# Patient Record
Sex: FEMALE
[De-identification: names, ages, dates, MRNs, and addresses within clinical notes are randomized per-mention and may not be internally consistent; named-entity substitution may affect disease eponyms.]

---

## 2018-02-16 PROBLEM — Z00.00 ENCOUNTER FOR PREVENTIVE HEALTH EXAMINATION: Status: ACTIVE | Noted: 2018-02-16

## 2020-01-22 ENCOUNTER — TRANSCRIPTION ENCOUNTER (OUTPATIENT)
Age: 40
End: 2020-01-22

## 2020-06-02 ENCOUNTER — APPOINTMENT (OUTPATIENT)
Dept: NEUROLOGY | Facility: CLINIC | Age: 40
End: 2020-06-02
Payer: COMMERCIAL

## 2020-06-02 VITALS
DIASTOLIC BLOOD PRESSURE: 75 MMHG | SYSTOLIC BLOOD PRESSURE: 108 MMHG | BODY MASS INDEX: 30.16 KG/M2 | HEIGHT: 68 IN | HEART RATE: 77 BPM | WEIGHT: 199 LBS

## 2020-06-02 DIAGNOSIS — F41.9 ANXIETY DISORDER, UNSPECIFIED: ICD-10-CM

## 2020-06-02 DIAGNOSIS — F51.5 NIGHTMARE DISORDER: ICD-10-CM

## 2020-06-02 DIAGNOSIS — Z83.3 FAMILY HISTORY OF DIABETES MELLITUS: ICD-10-CM

## 2020-06-02 DIAGNOSIS — H43.399 OTHER VITREOUS OPACITIES, UNSPECIFIED EYE: ICD-10-CM

## 2020-06-02 DIAGNOSIS — F17.200 NICOTINE DEPENDENCE, UNSPECIFIED, UNCOMPLICATED: ICD-10-CM

## 2020-06-02 DIAGNOSIS — F32.9 ANXIETY DISORDER, UNSPECIFIED: ICD-10-CM

## 2020-06-02 DIAGNOSIS — H53.9 UNSPECIFIED VISUAL DISTURBANCE: ICD-10-CM

## 2020-06-02 PROCEDURE — 99205 OFFICE O/P NEW HI 60 MIN: CPT | Mod: 25

## 2020-06-02 PROCEDURE — 96116 NUBHVL XM PHYS/QHP 1ST HR: CPT | Mod: 59

## 2020-06-02 RX ORDER — MULTIVIT-MIN/IRON/FOLIC ACID/K 18-600-40
CAPSULE ORAL
Refills: 0 | Status: ACTIVE | COMMUNITY

## 2020-06-02 RX ORDER — COLD-HOT PACK
EACH MISCELLANEOUS
Refills: 0 | Status: ACTIVE | COMMUNITY

## 2020-06-02 RX ORDER — PNV NO.95/FERROUS FUM/FOLIC AC 28MG-0.8MG
TABLET ORAL
Refills: 0 | Status: ACTIVE | COMMUNITY

## 2020-06-02 RX ORDER — MULTIVITAMIN
TABLET ORAL
Refills: 0 | Status: ACTIVE | COMMUNITY

## 2020-06-02 RX ORDER — GLUC/MSM/COLGN2/HYAL/ANTIARTH3 375-375-20
TABLET ORAL
Refills: 0 | Status: ACTIVE | COMMUNITY

## 2020-06-10 ENCOUNTER — APPOINTMENT (OUTPATIENT)
Dept: PULMONOLOGY | Facility: CLINIC | Age: 40
End: 2020-06-10
Payer: COMMERCIAL

## 2020-06-10 DIAGNOSIS — G47.52 REM SLEEP BEHAVIOR DISORDER: ICD-10-CM

## 2020-06-10 DIAGNOSIS — F19.982 OTHER PSYCHOACTIVE SUBSTANCE USE, UNSPECIFIED WITH PSYCHOACTIVE SUBSTANCE-INDUCED SLEEP DISORDER: ICD-10-CM

## 2020-06-10 DIAGNOSIS — R06.83 SNORING: ICD-10-CM

## 2020-06-10 PROCEDURE — 99204 OFFICE O/P NEW MOD 45 MIN: CPT | Mod: 95

## 2020-06-10 NOTE — HISTORY OF PRESENT ILLNESS
[Home] : at home, [unfilled] , at the time of the visit. [Medical Office: ([facility])___] : at the medical office located in  [Verbal consent obtained from patient] : the patient, [unfilled] [Other: ___] : [unfilled] [Snoring] : snoring [ESS: ___] : ESS score [unfilled] [Awakes Unrefreshed] : awakening unrefreshed [Awakes with Headache] : headache upon awakening [Unusual Sleep Behavior] : unusual sleep behavior [FreeTextEntry1] : This is a 39-year-old female with a significant past medical history of anxiety and depression who comes in for evaluation of unusual sleep behavior. She was referred by Dr. Sprague. The patient initially went to go see her neurologist for fear of having Alzheimer's-like behavior. She was noted to have unusual behaviors during sleep and insufficient sleep time and was referred to me.\par \par Patient states that she was told recently that she sleep talks. She admits to falling off her bed in the last 4 months which has never happened to her. She carries a history of sleep walking sleep eating while using hypnotic medications such zolpidem.\par \par She also complains of vivid dreams and feeling as though "she is still in the dream "when she wakes up. Her dreams or so vivid that she still has the same emotional response to them. She tends to have traumatic dreams and feels very upset when waking up. This usually happens in the morning around 8 or 9 AM.\par \par She carries a diagnosis of anxiety and depression and takes Wellbutrin and Cymbalta. She's taken antidepressant medication for over the last 1-2 years. She admits to smoking before bed and in the past has used THC to help her fall sleep. She can drink occasional alcohol and she has sleep initiation problems. [Witnessed Apneas] : no witnessed sleep apnea [Daytime Somnolence] : no daytime somnolence [Frequent Nocturnal Awakening] : no nocturnal awakening [Unintentional Sleep While Inactive] : no unintentional sleep while inactive [Unintentional Sleep while Active] : no unintentional sleep while active [Awakening With Dry Mouth] : no dry mouth upon awakening [DIS] : no DIS [Recent  Weight Gain] : no recent weight gain [DMS] : no DMS [Hypersomnolence] : no hypersomnolence [Cataplexy] : no cataplexy [Sleep Paralysis] : no sleep paralysis [Hypnagogic Hallucinations] : no hallucinations when falling asleep

## 2020-06-10 NOTE — REASON FOR VISIT
[Consultation] : a consultation visit [Sleep Apnea] : sleep apnea [FreeTextEntry2] : REM Behavior Disorder [FreeTextEntry1] : Referred by Dr. Sprague

## 2020-06-10 NOTE — CONSULT LETTER
[Dear  ___] : Dear  [unfilled], [Consult Letter:] : I had the pleasure of evaluating your patient, [unfilled]. [Please see my note below.] : Please see my note below. [Consult Closing:] : Thank you very much for allowing me to participate in the care of this patient.  If you have any questions, please do not hesitate to contact me. [Sincerely,] : Sincerely, [FreeTextEntry3] : Olaf Laguna DO, MPH\par Pulmonary, Critical Care, and Sleep Medicine\par  of Medicine\par [employer] [facility]

## 2020-06-10 NOTE — PHYSICAL EXAM
[General Appearance - Well Developed] : well developed [Normal Appearance] : normal appearance [Enlarged Base of the Tongue] : enlargement of the base of the tongue [General Appearance - Well Nourished] : well nourished [IV] : IV [Neck Appearance] : the appearance of the neck was normal

## 2020-06-10 NOTE — REVIEW OF SYSTEMS
[Fatigue] : fatigue [Snoring] : snoring [A.M. Headache] : headache present upon awakening [Depression] : depression [Anxious] : anxious [Difficulty Initiating Sleep] : difficulty falling asleep [Unusual Sleep Behavior] : unusual sleep behavior [Negative] : Genitourinary [Witnessed Apneas] : no witnessed apnea [EDS: ESS=____] : no daytime somnolence [Leg Dysesthesias] : no leg dysesthesias [A.M. Dry Mouth] : no a.m. dry mouth [Acute Insomnia] : no acute insomnia [Difficulty Maintaining Sleep] : no difficulty maintaining sleep [Lower Extremity Discomfort] : no lower extremity discomfort [Chronic Insomnia] : no chronic  insomnia [LE discomfort relieved by movement] : lower extremity discomfort not relieved by movement [Irresistible urge to move legs] : no irresistible urge to move legs because of lower extremity discomfort [Late day/ Evening symptoms] : no late day/evening symptoms [Hypersomnolence] : not sleeping much more than usual [Sleep Disturbances due to LE symptoms] : ~T no sleep disturbances due to lower extremity symptoms [Cataplexy] :  no cataplexy [Hypnogogic Hallucinations] : no hypnogogic hallucinations [Hypnopompic Hallucinations] : no hypnopompic hallucinations [Sleep Paralysis] : no sleep paralysis

## 2020-06-11 ENCOUNTER — APPOINTMENT (OUTPATIENT)
Dept: NEUROLOGY | Facility: CLINIC | Age: 40
End: 2020-06-11

## 2020-07-01 ENCOUNTER — APPOINTMENT (OUTPATIENT)
Dept: NEUROLOGY | Facility: CLINIC | Age: 40
End: 2020-07-01
Payer: COMMERCIAL

## 2020-07-01 PROCEDURE — 96121 NUBHVL XM PHY/QHP EA ADDL HR: CPT | Mod: 95

## 2020-07-01 PROCEDURE — 96116 NUBHVL XM PHYS/QHP 1ST HR: CPT | Mod: 95

## 2020-07-08 ENCOUNTER — APPOINTMENT (OUTPATIENT)
Dept: NEUROLOGY | Facility: CLINIC | Age: 40
End: 2020-07-08
Payer: COMMERCIAL

## 2020-07-08 PROCEDURE — 96138 PSYCL/NRPSYC TECH 1ST: CPT | Mod: 95

## 2020-07-08 PROCEDURE — 95816 EEG AWAKE AND DROWSY: CPT

## 2020-07-08 PROCEDURE — 96133 NRPSYC TST EVAL PHYS/QHP EA: CPT | Mod: 95

## 2020-08-12 ENCOUNTER — APPOINTMENT (OUTPATIENT)
Dept: NEUROLOGY | Facility: CLINIC | Age: 40
End: 2020-08-12

## 2020-08-16 DIAGNOSIS — Z01.818 ENCOUNTER FOR OTHER PREPROCEDURAL EXAMINATION: ICD-10-CM

## 2020-08-18 ENCOUNTER — APPOINTMENT (OUTPATIENT)
Dept: DISASTER EMERGENCY | Facility: CLINIC | Age: 40
End: 2020-08-18

## 2020-08-19 LAB — SARS-COV-2 N GENE NPH QL NAA+PROBE: NOT DETECTED

## 2020-08-21 ENCOUNTER — FORM ENCOUNTER (OUTPATIENT)
Age: 40
End: 2020-08-21

## 2020-08-22 ENCOUNTER — OUTPATIENT (OUTPATIENT)
Dept: OUTPATIENT SERVICES | Facility: HOSPITAL | Age: 40
LOS: 1 days | End: 2020-08-22
Payer: COMMERCIAL

## 2020-08-22 ENCOUNTER — APPOINTMENT (OUTPATIENT)
Dept: SLEEP CENTER | Facility: CLINIC | Age: 40
End: 2020-08-22
Payer: COMMERCIAL

## 2020-08-22 PROCEDURE — 95810 POLYSOM 6/> YRS 4/> PARAM: CPT | Mod: 26

## 2020-08-22 PROCEDURE — 95810 POLYSOM 6/> YRS 4/> PARAM: CPT

## 2020-08-24 DIAGNOSIS — G47.33 OBSTRUCTIVE SLEEP APNEA (ADULT) (PEDIATRIC): ICD-10-CM

## 2020-09-02 ENCOUNTER — LABORATORY RESULT (OUTPATIENT)
Age: 40
End: 2020-09-02

## 2020-09-02 ENCOUNTER — APPOINTMENT (OUTPATIENT)
Dept: DISASTER EMERGENCY | Facility: CLINIC | Age: 40
End: 2020-09-02

## 2020-09-02 DIAGNOSIS — G47.61 PERIODIC LIMB MOVEMENT DISORDER: ICD-10-CM

## 2020-09-04 ENCOUNTER — APPOINTMENT (OUTPATIENT)
Dept: NEUROLOGY | Facility: CLINIC | Age: 40
End: 2020-09-04
Payer: COMMERCIAL

## 2020-09-04 PROCEDURE — 96133 NRPSYC TST EVAL PHYS/QHP EA: CPT

## 2020-09-04 PROCEDURE — 96139 PSYCL/NRPSYC TST TECH EA: CPT

## 2020-09-25 LAB
BASOPHILS # BLD AUTO: 0.06 K/UL
BASOPHILS NFR BLD AUTO: 0.9 %
EOSINOPHIL # BLD AUTO: 0.29 K/UL
EOSINOPHIL NFR BLD AUTO: 4.6 %
HCT VFR BLD CALC: 35.2 %
HGB BLD-MCNC: 10.7 G/DL
IMM GRANULOCYTES NFR BLD AUTO: 0.2 %
LYMPHOCYTES # BLD AUTO: 1.96 K/UL
LYMPHOCYTES NFR BLD AUTO: 31 %
MAN DIFF?: NORMAL
MCHC RBC-ENTMCNC: 27.4 PG
MCHC RBC-ENTMCNC: 30.4 GM/DL
MCV RBC AUTO: 90 FL
MONOCYTES # BLD AUTO: 0.41 K/UL
MONOCYTES NFR BLD AUTO: 6.5 %
NEUTROPHILS # BLD AUTO: 3.6 K/UL
NEUTROPHILS NFR BLD AUTO: 56.8 %
PLATELET # BLD AUTO: 373 K/UL
RBC # BLD: 3.91 M/UL
RBC # FLD: 13.7 %
WBC # FLD AUTO: 6.33 K/UL

## 2020-09-29 LAB
ANION GAP SERPL CALC-SCNC: 14 MMOL/L
BUN SERPL-MCNC: 11 MG/DL
CALCIUM SERPL-MCNC: 8.9 MG/DL
CHLORIDE SERPL-SCNC: 101 MMOL/L
CO2 SERPL-SCNC: 22 MMOL/L
CREAT SERPL-MCNC: 0.65 MG/DL
FERRITIN SERPL-MCNC: 17 NG/ML
GLUCOSE SERPL-MCNC: 95 MG/DL
IRON SATN MFR SERPL: 31 %
IRON SERPL-MCNC: 106 UG/DL
POTASSIUM SERPL-SCNC: 4.5 MMOL/L
SODIUM SERPL-SCNC: 136 MMOL/L
TIBC SERPL-MCNC: 336 UG/DL
UIBC SERPL-MCNC: 231 UG/DL

## 2020-10-07 ENCOUNTER — APPOINTMENT (OUTPATIENT)
Dept: NEUROLOGY | Facility: CLINIC | Age: 40
End: 2020-10-07
Payer: COMMERCIAL

## 2020-10-07 VITALS — SYSTOLIC BLOOD PRESSURE: 107 MMHG | HEART RATE: 76 BPM | DIASTOLIC BLOOD PRESSURE: 71 MMHG

## 2020-10-07 VITALS — TEMPERATURE: 97.2 F

## 2020-10-07 DIAGNOSIS — Z87.898 PERSONAL HISTORY OF OTHER SPECIFIED CONDITIONS: ICD-10-CM

## 2020-10-07 DIAGNOSIS — R41.89 OTHER SYMPTOMS AND SIGNS INVOLVING COGNITIVE FUNCTIONS AND AWARENESS: ICD-10-CM

## 2020-10-07 PROCEDURE — 99215 OFFICE O/P EST HI 40 MIN: CPT

## 2020-12-30 ENCOUNTER — TRANSCRIPTION ENCOUNTER (OUTPATIENT)
Age: 40
End: 2020-12-30

## 2021-01-29 ENCOUNTER — NON-APPOINTMENT (OUTPATIENT)
Age: 41
End: 2021-01-29

## 2021-04-05 ENCOUNTER — APPOINTMENT (OUTPATIENT)
Dept: PULMONOLOGY | Facility: CLINIC | Age: 41
End: 2021-04-05
Payer: MEDICAID

## 2021-04-05 ENCOUNTER — APPOINTMENT (OUTPATIENT)
Dept: DISASTER EMERGENCY | Facility: OTHER | Age: 41
End: 2021-04-05
Payer: COMMERCIAL

## 2021-04-05 DIAGNOSIS — G47.61 PERIODIC LIMB MOVEMENT DISORDER: ICD-10-CM

## 2021-04-05 DIAGNOSIS — G25.81 RESTLESS LEGS SYNDROME: ICD-10-CM

## 2021-04-05 PROCEDURE — 99214 OFFICE O/P EST MOD 30 MIN: CPT | Mod: 95

## 2021-04-05 PROCEDURE — 0001A: CPT

## 2021-04-05 RX ORDER — DULOXETINE HYDROCHLORIDE 60 MG/1
60 CAPSULE, DELAYED RELEASE ORAL
Refills: 0 | Status: DISCONTINUED | COMMUNITY
End: 2021-04-05

## 2021-04-05 RX ORDER — BUPROPION HYDROCHLORIDE 75 MG/1
75 TABLET, FILM COATED ORAL
Refills: 0 | Status: DISCONTINUED | COMMUNITY
End: 2021-04-05

## 2021-04-08 PROBLEM — G25.81 RESTLESS LEG SYNDROME: Status: ACTIVE | Noted: 2021-04-08

## 2021-04-08 PROBLEM — G47.61 PLMD (PERIODIC LIMB MOVEMENT DISORDER): Status: ACTIVE | Noted: 2021-04-08

## 2021-04-08 NOTE — PHYSICAL EXAM
[General Appearance - Well Developed] : well developed [General Appearance - Well Nourished] : well nourished [Oriented To Time, Place, And Person] : oriented to person, place, and time [Impaired Insight] : insight and judgment were intact [Affect] : the affect was normal

## 2021-04-08 NOTE — REVIEW OF SYSTEMS
[EDS: ESS=____] : daytime somnolence: ESS=[unfilled] [Snoring] : snoring [Leg Dysesthesias] : leg dysesthesias [Depression] : depression [Lower Extremity Discomfort] : lower extremity discomfort [Irresistible urge to move legs] : irresistible urge to move legs because of lower extremity discomfort [LE discomfort relieved by movement] : lower extremity discomfort relieved by movement [Late day/ Evening symptoms] : late day/evening symptoms [Sleep Disturbances due to LE symptoms] : ~T sleep disturbances due to lower extremity symptoms [Mild] : RLS symptom severity: mild [Negative] : Genitourinary [Witnessed Apneas] : no witnessed apnea [A.M. Dry Mouth] : no a.m. dry mouth [Unusual Sleep Behavior] : no unusual sleep behavior [Hypersomnolence] : not sleeping much more than usual [Cataplexy] :  no cataplexy [Hypnogogic Hallucinations] : no hypnogogic hallucinations [Hypnopompic Hallucinations] : no hypnopompic hallucinations

## 2021-04-08 NOTE — HISTORY OF PRESENT ILLNESS
[Home] : at home, [unfilled] , at the time of the visit. [Medical Office: ([facility])___] : at the medical office located in  [Verbal consent obtained from patient] : the patient, [unfilled] [Snoring] : snoring [Frequent Nocturnal Awakening] : frequent nocturnal awakening [ESS: ___] : ESS score [unfilled] [Awakes Unrefreshed] : awakening unrefreshed [DIS] : DIS [DMS] : DMS [Lower Extremity Discomfort] : lower extremity discomfort in evening or at bedtime [FreeTextEntry1] : \par \par Ms. Meadows is a 40 year old female who come in for a follow up. Per the patient she continues to have frequent nocturnal awakenings in the middle of the night and nonrestorative sleep. She was found to have periodic limb movements and after further questioning she has some RLS symptoms as well. She was not found to have any sleep disordered breathing. When she took her PSG she was on Cymbalta and Wellbutrin which could affect her PLMs. She is currently off all psychiatric medications. She has complied with my request of taking iron with vitamin C. She is interested in obtaining another PSG. \par \par ______________________________________________________________________________________ \par EPWORTH SLEEPINESS SCALE \par How likely are you to doze off or fall asleep in the situations described below, in contrast to feeling just tired? \par This refers to your usual way of life in recent times. \par Even if you haven't done some of these things recently, try to work out how they would have affected you. \par Use the following scale to choose one most appropriate number for each situation. \par \par 0 = Never would doze \par 1 = Slight chance of dozing \par 2 = Moderate chance of dozing \par 3 = High chance of dozing \par \par  \par 2........................................Sitting and reading \par 0........................................Watching TV \par 0........................................Sitting inactive in a public place (eg a theatre or a meeting) \par 0........................................As a passenger in a car for an hour without a break \par 2........................................Lying down to rest in the afternoon when circumstances permit \par 0........................................Sitting and talking to someone \par 1........................................Sitting quietly after lunch without alcohol \par 0........................................In a car, while stopped for a few minutes in traffic \par 5........................................TOTAL SCORE \par ______________________________________________________________________________________ \par \par  [Witnessed Apneas] : no witnessed sleep apnea [Unintentional Sleep while Active] : no unintentional sleep while active [Unintentional Sleep While Inactive] : no unintentional sleep while inactive [Awakes with Headache] : no headache upon awakening [Hypersomnolence] : no hypersomnolence [Cataplexy] : no cataplexy [Sleep Paralysis] : no sleep paralysis [Hypnagogic Hallucinations] : no hallucinations when falling asleep [Hypnopompic Hallucinations] : no hallucinations when awakening

## 2021-04-24 ENCOUNTER — OUTPATIENT (OUTPATIENT)
Dept: OUTPATIENT SERVICES | Facility: HOSPITAL | Age: 41
LOS: 1 days | End: 2021-04-24
Payer: MEDICAID

## 2021-04-24 ENCOUNTER — APPOINTMENT (OUTPATIENT)
Dept: SLEEP CENTER | Facility: CLINIC | Age: 41
End: 2021-04-24
Payer: MEDICAID

## 2021-04-24 PROCEDURE — 95810 POLYSOM 6/> YRS 4/> PARAM: CPT

## 2021-04-24 PROCEDURE — 95810 POLYSOM 6/> YRS 4/> PARAM: CPT | Mod: 26

## 2021-04-26 ENCOUNTER — APPOINTMENT (OUTPATIENT)
Dept: DISASTER EMERGENCY | Facility: OTHER | Age: 41
End: 2021-04-26

## 2021-04-26 DIAGNOSIS — G47.33 OBSTRUCTIVE SLEEP APNEA (ADULT) (PEDIATRIC): ICD-10-CM

## 2021-05-07 ENCOUNTER — NON-APPOINTMENT (OUTPATIENT)
Age: 41
End: 2021-05-07

## 2021-06-01 ENCOUNTER — OUTPATIENT (OUTPATIENT)
Dept: OUTPATIENT SERVICES | Facility: HOSPITAL | Age: 41
LOS: 1 days | End: 2021-06-01
Payer: MEDICAID

## 2021-06-01 PROCEDURE — G9005: CPT

## 2021-06-08 DIAGNOSIS — Z71.89 OTHER SPECIFIED COUNSELING: ICD-10-CM

## 2021-07-07 ENCOUNTER — APPOINTMENT (OUTPATIENT)
Dept: PULMONOLOGY | Facility: CLINIC | Age: 41
End: 2021-07-07
Payer: MEDICAID

## 2021-07-07 DIAGNOSIS — F51.04 PSYCHOPHYSIOLOGIC INSOMNIA: ICD-10-CM

## 2021-07-07 PROCEDURE — 99214 OFFICE O/P EST MOD 30 MIN: CPT | Mod: 95

## 2021-07-07 RX ORDER — ZALEPLON 5 MG/1
5 CAPSULE ORAL
Qty: 30 | Refills: 0 | Status: ACTIVE | COMMUNITY
Start: 2021-07-07 | End: 1900-01-01

## 2021-07-07 NOTE — ASSESSMENT
[FreeTextEntry1] : Ms. Meadows is a 40 year old female who come in for a follow up. She does not have any significant sleep disorder breathing in order she have any periodic limb movements on her last sleep study. She is previously tried 3 different medications with only trazodone being successful. Given that her main issue is sleep initiation, she may try a medication like Sonata which has a short half-life and to help her fall asleep and not feel groggy in the morning. I have instructed her only to take this medication and to discontinue her other medications prior to bedtime. She will initiate 5 mg of Sonata and if this is unsuccessful she can take 10 mg.\par \par She will follow up with me in 2-3 months.

## 2021-07-07 NOTE — HISTORY OF PRESENT ILLNESS
[Home] : at home, [unfilled] , at the time of the visit. [Medical Office: ([facility])___] : at the medical office located in  [Verbal consent obtained from patient] : the patient, [unfilled] [Insomnia Due To Mental Disorder] : insomnia due to mental disorder [Snoring] : snoring [Frequent Nocturnal Awakening] : frequent nocturnal awakening [Awakes Unrefreshed] : awakening unrefreshed [DIS] : DIS [DMS] : DMS [Lower Extremity Discomfort] : lower extremity discomfort in evening or at bedtime [FreeTextEntry1] : \par \par Ms. Meadows is a 40 year old female who come in for a follow up. She did not have any significant sleep disordered breathing on her in lab polysomnography and there is significant periodic limb movements either. Her psychiatrist has placed her on Zoloft, but for sleep she is on trazodone 100-200 mg q.h.s., hydroxyzine 25 mg q.h.s., or Ambien. She does not like taking hydroxyzine or Ambien because it makes her groggy the next morning. She also does not like taking high doses of trazodone. She is looking for another alternative to help with her insomnia. She does admit that her sleep initiation is more of an issue than sleep maintenance.\par ______________________________________________________________________________________ \par EPWORTH SLEEPINESS SCALE \par How likely are you to doze off or fall asleep in the situations described below, in contrast to feeling just tired? \par This refers to your usual way of life in recent times. \par Even if you haven't done some of these things recently, try to work out how they would have affected you. \par Use the following scale to choose one most appropriate number for each situation. \par \par 0 = Never would doze \par 1 = Slight chance of dozing \par 2 = Moderate chance of dozing \par 3 = High chance of dozing \par \par  \par 2........................................Sitting and reading \par 0........................................Watching TV \par 0........................................Sitting inactive in a public place (eg a theatre or a meeting) \par 0........................................As a passenger in a car for an hour without a break \par 2........................................Lying down to rest in the afternoon when circumstances permit \par 0........................................Sitting and talking to someone \par 1........................................Sitting quietly after lunch without alcohol \par 0........................................In a car, while stopped for a few minutes in traffic \par 5........................................TOTAL SCORE \par ______________________________________________________________________________________ \par \par  [Witnessed Apneas] : no witnessed sleep apnea [Unintentional Sleep while Active] : no unintentional sleep while active [Unintentional Sleep While Inactive] : no unintentional sleep while inactive [Awakes with Headache] : no headache upon awakening [Hypersomnolence] : no hypersomnolence [Cataplexy] : no cataplexy [Sleep Paralysis] : no sleep paralysis [Hypnagogic Hallucinations] : no hallucinations when falling asleep [Hypnopompic Hallucinations] : no hallucinations when awakening

## 2023-03-20 ENCOUNTER — OFFICE VISIT (OUTPATIENT)
Dept: FAMILY MEDICINE CLINIC | Facility: CLINIC | Age: 43
End: 2023-03-20

## 2023-03-20 VITALS
BODY MASS INDEX: 26.78 KG/M2 | HEIGHT: 69 IN | SYSTOLIC BLOOD PRESSURE: 98 MMHG | TEMPERATURE: 98.1 F | OXYGEN SATURATION: 98 % | HEART RATE: 64 BPM | WEIGHT: 180.8 LBS | DIASTOLIC BLOOD PRESSURE: 62 MMHG

## 2023-03-20 DIAGNOSIS — N39.3 STRESS INCONTINENCE OF URINE: ICD-10-CM

## 2023-03-20 DIAGNOSIS — Z11.4 SCREENING FOR HIV (HUMAN IMMUNODEFICIENCY VIRUS): ICD-10-CM

## 2023-03-20 DIAGNOSIS — Z12.31 ENCOUNTER FOR SCREENING MAMMOGRAM FOR MALIGNANT NEOPLASM OF BREAST: ICD-10-CM

## 2023-03-20 DIAGNOSIS — Z12.83 SCREENING FOR SKIN CANCER: ICD-10-CM

## 2023-03-20 DIAGNOSIS — F32.1 CURRENT MODERATE EPISODE OF MAJOR DEPRESSIVE DISORDER, UNSPECIFIED WHETHER RECURRENT (HCC): Primary | ICD-10-CM

## 2023-03-20 DIAGNOSIS — Z11.59 ENCOUNTER FOR HEPATITIS C SCREENING TEST FOR LOW RISK PATIENT: ICD-10-CM

## 2023-03-20 DIAGNOSIS — R63.5 WEIGHT GAIN: ICD-10-CM

## 2023-03-20 DIAGNOSIS — B00.1 COLD SORE: ICD-10-CM

## 2023-03-20 DIAGNOSIS — K92.1 BLOOD IN STOOL: ICD-10-CM

## 2023-03-20 RX ORDER — VALACYCLOVIR HYDROCHLORIDE 1 G/1
TABLET, FILM COATED ORAL
COMMUNITY
Start: 2022-12-13 | End: 2023-03-20 | Stop reason: SDUPTHER

## 2023-03-20 RX ORDER — VALACYCLOVIR HYDROCHLORIDE 1 G/1
TABLET, FILM COATED ORAL
Qty: 10 TABLET | Refills: 1 | Status: SHIPPED | OUTPATIENT
Start: 2023-03-20 | End: 2023-03-21

## 2023-03-20 RX ORDER — SERTRALINE HYDROCHLORIDE 25 MG/1
25 TABLET, FILM COATED ORAL EVERY MORNING
COMMUNITY
Start: 2023-02-14

## 2023-03-20 NOTE — ASSESSMENT & PLAN NOTE
To continue sertraline 75 mg daily  Advised to resume counseling    Provided referral to psychiatrist

## 2023-03-20 NOTE — PROGRESS NOTES
Assessment/Plan:    Current moderate episode of major depressive disorder ([facility] )  To continue sertraline 75 mg daily  Advised to resume counseling  Provided referral to psychiatrist     Weight gain  To obtain labs, will call with results  Stress incontinence of urine  To begin pelvic floor therapy  Blood in stool  Provided referral to GI  Diagnoses and all orders for this visit:    Current moderate episode of major depressive disorder, unspecified whether recurrent ([facility] )  -     TSH, 3rd generation with Free T4 reflex; Future  -     Ambulatory Referral to Psychiatry; Future  -     Follicle stimulating hormone; Future  -     Luteinizing hormone; Future  -     Estradiol; Future    Encounter for screening mammogram for malignant neoplasm of breast  -     Mammo screening bilateral w 3d & cad; Future    Screening for HIV (human immunodeficiency virus)  -     : HIV 1/2 AB/AG w Reflex [facility] for 2 yr old and above; Future    Encounter for hepatitis C screening test for low risk patient  -     Hepatitis C antibody; Future    Blood in stool  -     Ambulatory referral to Gastroenterology; Future    Stress incontinence of urine  -     Ambulatory Referral to Physical Therapy; Future    Screening for skin cancer  Comments:  Provided referral to dermatology  Orders:  -     Ambulatory Referral to Dermatology; Future    Weight gain  -     CBC and differential; Future  -     Comprehensive metabolic panel; Future  -     Lipid Panel with Direct LDL reflex; Future  -     TSH, 3rd generation with Free T4 reflex; Future  -     Follicle stimulating hormone; Future  -     Luteinizing hormone; Future  -     Estradiol; Future    Cold sore  -     valACYclovir (VALTREX) 1,000 mg tablet; Take 2 tablets by mouth every 12 hours x 1 day    Other orders  -     Discontinue: valACYclovir (VALTREX) 1,000 mg tablet; TAKE 2 TABLETS BY MOUTH EVERY 12 HOURS FOR 1 DAY  -     sertraline (ZOLOFT) 50 mg tablet;  Take 50 mg by mouth daily  - sertraline (ZOLOFT) 25 mg tablet; Take 25 mg by mouth every morning          Subjective:      Patient ID: Sergey Masterson is a 36 y o  female  Fátima Juares presents for an initial visit  She recently relocated from [place] about 2 years ago  She has a past medical history of anxiety and depression  She has a past surgical history of breast augmentation  She would like a referral to a psychiatrist to continue care as she has been previously connected to one in the past   She does receive counseling but needs to find one locally  She is requesting lab work  Fátimaraheem Juares is experiencing hot flashes, menstrual changes, and vaginal dryness as well as weight gain  She is requesting referral to a dermatologist to be screened for skin cancer  Would also like to begin pelvic floor therapy due to stress incontinence  She has tried Kegel exercises in the past   Additionally, Fátima Juares will note blood after having a bowel movement  She had a colonoscopy in 2018 which was normal       The following portions of the patient's history were reviewed and updated as appropriate: She   Patient Active Problem List    Diagnosis Date Noted   • Current moderate episode of major depressive disorder ([facility] ) 03/20/2023   • Blood in stool 03/20/2023   • Stress incontinence of urine 03/20/2023   • Weight gain 03/20/2023     Current Outpatient Medications   Medication Sig Dispense Refill   • sertraline (ZOLOFT) 25 mg tablet Take 25 mg by mouth every morning     • sertraline (ZOLOFT) 50 mg tablet Take 50 mg by mouth daily     • valACYclovir (VALTREX) 1,000 mg tablet Take 2 tablets by mouth every 12 hours x 1 day 10 tablet 1     No current facility-administered medications for this visit  She has No Known Allergies       Review of Systems   Constitutional: Positive for unexpected weight change (weight gain)  HENT: Positive for congestion (Chronic, after breaking her nose)  Eyes: Negative  Respiratory: Negative      Cardiovascular: Negative  Gastrointestinal: Positive for blood in stool  Endocrine: Positive for heat intolerance (hot flashes)  Genitourinary:        Urinary incontinence    Musculoskeletal: Negative  Skin: Negative  Allergic/Immunologic: Negative  Neurological: Negative  Psychiatric/Behavioral: Positive for dysphoric mood  The patient is nervous/anxious  BP 98/62   Pulse 64   Temp 98 1 °F (36 7 °C)   Ht 5' 9" (1 753 m)   Wt 82 kg (180 lb 12 8 oz)   SpO2 98%   BMI 26 70 kg/m²     Objective:     Physical Exam  Vitals and nursing note reviewed  Constitutional:       General: She is not in acute distress  Appearance: She is well-developed  HENT:      Head: Normocephalic and atraumatic  Right Ear: Hearing, tympanic membrane and external ear normal       Left Ear: Hearing, tympanic membrane and external ear normal       Nose: Nose normal       Mouth/Throat:      Pharynx: Uvula midline  Eyes:      General: Lids are normal       Conjunctiva/sclera: Conjunctivae normal       Pupils: Pupils are equal, round, and reactive to light  Neck:      Thyroid: No thyromegaly  Cardiovascular:      Rate and Rhythm: Normal rate and regular rhythm  Heart sounds: Normal heart sounds  No murmur heard  Pulmonary:      Effort: Pulmonary effort is normal  No respiratory distress  Breath sounds: Normal breath sounds  No wheezing or rales  Chest:      Chest wall: No tenderness  Abdominal:      General: Bowel sounds are normal  There is no distension  Palpations: Abdomen is soft  There is no mass  Tenderness: There is no abdominal tenderness  There is no guarding or rebound  Musculoskeletal:         General: No tenderness or deformity  Normal range of motion  Cervical back: Normal range of motion and neck supple  Lymphadenopathy:      Cervical: No cervical adenopathy  Skin:     General: Skin is warm and dry  Coloration: Skin is not pale        Findings: No erythema or rash    Neurological:      Mental Status: She is alert and oriented to person, place, and time  Cranial Nerves: No cranial nerve deficit  Psychiatric:         Behavior: Behavior normal          Thought Content: Thought content normal          Judgment: Judgment normal          BMI Counseling: Body mass index is 26 7 kg/m²  The BMI is above normal  Nutrition recommendations include decreasing overall calorie intake, 3-5 servings of fruits/vegetables daily, reducing fast food intake, consuming healthier snacks, decreasing soda and/or juice intake, moderation in carbohydrate intake and increasing intake of lean protein  Exercise recommendations include moderate aerobic physical activity for 150 minutes/week  Depression Screening Follow-up Plan: Patient's depression screening was positive with a PHQ-2 score of 5  Their PHQ-9 score was   Patient assessed for underlying major depression  They have no active suicidal ideations  Brief counseling provided and recommend additional follow-up/re-evaluation next office visit

## 2023-04-21 DIAGNOSIS — B00.1 COLD SORE: ICD-10-CM

## 2023-04-21 NOTE — TELEPHONE ENCOUNTER
Pt left a message stating that she couldn't get a Psychiatry appointment, she is on a waiting list and she is running out of Zoloft for 50 and 25 mg

## 2023-04-24 DIAGNOSIS — F32.1 CURRENT MODERATE EPISODE OF MAJOR DEPRESSIVE DISORDER, UNSPECIFIED WHETHER RECURRENT (HCC): ICD-10-CM

## 2023-04-24 DIAGNOSIS — Z12.31 ENCOUNTER FOR SCREENING MAMMOGRAM FOR MALIGNANT NEOPLASM OF BREAST: Primary | ICD-10-CM

## 2023-04-24 RX ORDER — SERTRALINE HYDROCHLORIDE 25 MG/1
25 TABLET, FILM COATED ORAL EVERY MORNING
Qty: 30 TABLET | Refills: 3 | Status: SHIPPED | OUTPATIENT
Start: 2023-04-24

## 2023-04-24 RX ORDER — VALACYCLOVIR HYDROCHLORIDE 1 G/1
TABLET, FILM COATED ORAL
Qty: 10 TABLET | Refills: 1 | Status: SHIPPED | OUTPATIENT
Start: 2023-04-24 | End: 2023-05-22

## 2023-05-03 ENCOUNTER — TELEPHONE (OUTPATIENT)
Dept: GASTROENTEROLOGY | Facility: CLINIC | Age: 43
End: 2023-05-03

## 2023-05-08 DIAGNOSIS — K58.0 IRRITABLE BOWEL SYNDROME WITH DIARRHEA: Primary | ICD-10-CM

## 2023-05-08 NOTE — TELEPHONE ENCOUNTER
Pt calling asking to speak with nurse regarding medical records that were received from [address] [place] [address]

## 2023-05-16 DIAGNOSIS — F32.1 CURRENT MODERATE EPISODE OF MAJOR DEPRESSIVE DISORDER, UNSPECIFIED WHETHER RECURRENT (HCC): ICD-10-CM

## 2023-05-24 ENCOUNTER — TELEPHONE (OUTPATIENT)
Dept: GASTROENTEROLOGY | Facility: CLINIC | Age: 43
End: 2023-05-24

## 2023-06-15 ENCOUNTER — TELEPHONE (OUTPATIENT)
Dept: PSYCHIATRY | Facility: CLINIC | Age: 43
End: 2023-06-15

## 2023-06-15 NOTE — TELEPHONE ENCOUNTER
Contacted patient off of Medication Management  to verify needs of services in attempts to offer patient an appointment at [facility] for patient to contact intake dept  in regards to scheduling appointment

## 2023-06-21 NOTE — PROGRESS NOTES
Diagnoses and all orders for this visit:    External hemorrhoids  -     Ambulatory Referral to Colorectal Surgery       External hemorrhoids  Patient presents for evaluation of perianal concerns  Patient notes occasional blood when wiping  This been going on for some time  No recent exacerbation  Patient had colonoscopy in 2019  Examination shows anterior skin redundancy without narrow-base skin tag  Small grade 2 internal hemorrhoids are noted  Examination today is largely unremarkable  Intermittent perianal skin symptoms  Excision of the skin redundancy is unlikely to give her significant benefit is not recommended at this time  Topical Calmoseptine is reasonable for intermittent skin irritation  I suspect that this bleeding is given her description  Otherwise high-fiber diet is recommended  I will see her back as her symptoms dictate  HPI     [place] Cao is here today referred by SHAN Love external hemorrhoids  The patient notes an anal lumps and bright red rectal bleeding on the tissue paper, on and off; symptoms have been ongoing for years  Patient notes she has IBS with episodes of diarrhea  His most recent colonoscopy on 6/20/2019 with Dr John Dixon showed non bleeding internal hemorrhoids and skin tags  5 year recall due to family history of colonic polyps in her mother  No past medical history on file    Past Surgical History:   Procedure Laterality Date   • AUGMENTATION BREAST Bilateral        Current Outpatient Medications:   •  hydrocortisone (ANUSOL-HC) 2 5 % rectal cream, Apply topically 2 (two) times a day, Disp: 28 g, Rfl: 1  •  sertraline (ZOLOFT) 25 mg tablet, Take 1 tablet (25 mg total) by mouth every morning, Disp: 30 tablet, Rfl: 3  •  sertraline (ZOLOFT) 50 mg tablet, TAKE 1 TABLET BY MOUTH EVERY DAY, Disp: 90 tablet, Rfl: 2  •  valACYclovir (VALTREX) 1,000 mg tablet, Take 2 tablets by mouth every 12 hours x 1 day, Disp: 10 tablet, Rfl: 1  Allergies as of 06/26/2023   • (No Known Allergies)     Review of Systems   Gastrointestinal: Positive for anal bleeding and diarrhea  All other systems reviewed and are negative  There were no vitals filed for this visit  Physical Exam  Constitutional:       Appearance: Normal appearance  HENT:      Head: Normocephalic and atraumatic  Eyes:      Extraocular Movements: Extraocular movements intact  Pupils: Pupils are equal, round, and reactive to light  Musculoskeletal:         General: Normal range of motion  Skin:     General: Skin is warm and dry  Neurological:      General: No focal deficit present  Mental Status: She is alert and oriented to person, place, and time  Psychiatric:         Mood and Affect: Mood normal          Behavior: Behavior normal          Thought Content: Thought content normal          Judgment: Judgment normal      Lower Endoscopy    Date/Time: 6/26/2023 1:15 PM    Performed by: Shalini Damian MD  Authorized by: Shalini Damian MD    Verbal consent obtained?: Yes    Risks and benefits: Risks, benefits and alternatives were discussed    Consent given by:  Patient  Patient identity confirmed:  Verbally with patient  Indications: rectal bleeding    Patient sedated: No    Scope type:   Anoscope  External exam performed: Yes    Pilonidal sinus tract: No    Pilonidal cyst: No    Pilonidal tenderness: No    Perianal skin tags: Yes    Perirectal warts: No    Perianal maceration: No    Perianal induration: No    Perianal erythema: No    External hemorrhoids: No    Digital exam performed: Yes    Laxity of anal sphincter: No    Internal hemorrhoids: Yes    Prolapsed: No    Inflammation: No    Anal fissures: No    Anal fistulae: No    Anal stricture: No    Abscess: No    Procedure termination:  Procedure complete  Patient tolerance:  Patient tolerated the procedure well with no immediate complications

## 2023-06-26 ENCOUNTER — OFFICE VISIT (OUTPATIENT)
Age: 43
End: 2023-06-26
Payer: COMMERCIAL

## 2023-06-26 VITALS — HEIGHT: 68 IN | WEIGHT: 178 LBS | BODY MASS INDEX: 26.98 KG/M2

## 2023-06-26 DIAGNOSIS — K64.4 EXTERNAL HEMORRHOIDS: Primary | ICD-10-CM

## 2023-06-26 PROCEDURE — 99203 OFFICE O/P NEW LOW 30 MIN: CPT | Performed by: COLON & RECTAL SURGERY

## 2023-06-26 PROCEDURE — 46600 DIAGNOSTIC ANOSCOPY SPX: CPT | Performed by: COLON & RECTAL SURGERY

## 2023-06-26 NOTE — ASSESSMENT & PLAN NOTE
Patient presents for evaluation of perianal concerns  Patient notes occasional blood when wiping  This been going on for some time  No recent exacerbation  Patient had colonoscopy in 2019  Examination shows anterior skin redundancy without narrow-base skin tag  Small grade 2 internal hemorrhoids are noted  Examination today is largely unremarkable  Intermittent perianal skin symptoms  Excision of the skin redundancy is unlikely to give her significant benefit is not recommended at this time  Topical Calmoseptine is reasonable for intermittent skin irritation  I suspect that this bleeding is given her description  Otherwise high-fiber diet is recommended  I will see her back as her symptoms dictate

## 2023-06-27 ENCOUNTER — APPOINTMENT (OUTPATIENT)
Dept: LAB | Facility: CLINIC | Age: 43
End: 2023-06-27
Payer: COMMERCIAL

## 2023-06-27 DIAGNOSIS — R63.5 WEIGHT GAIN: ICD-10-CM

## 2023-06-27 DIAGNOSIS — K59.1 FUNCTIONAL DIARRHEA: ICD-10-CM

## 2023-06-27 DIAGNOSIS — Z11.59 ENCOUNTER FOR HEPATITIS C SCREENING TEST FOR LOW RISK PATIENT: ICD-10-CM

## 2023-06-27 DIAGNOSIS — F32.1 CURRENT MODERATE EPISODE OF MAJOR DEPRESSIVE DISORDER, UNSPECIFIED WHETHER RECURRENT (HCC): ICD-10-CM

## 2023-06-27 DIAGNOSIS — Z11.4 SCREENING FOR HIV (HUMAN IMMUNODEFICIENCY VIRUS): ICD-10-CM

## 2023-06-27 LAB
ALBUMIN SERPL BCP-MCNC: 3.6 G/DL (ref 3.5–5)
ALP SERPL-CCNC: 54 U/L (ref 46–116)
ALT SERPL W P-5'-P-CCNC: 22 U/L (ref 12–78)
ANION GAP SERPL CALCULATED.3IONS-SCNC: 2 MMOL/L
AST SERPL W P-5'-P-CCNC: 21 U/L (ref 5–45)
BASOPHILS # BLD AUTO: 0.05 THOUSANDS/ÂΜL (ref 0–0.1)
BASOPHILS NFR BLD AUTO: 1 % (ref 0–1)
BILIRUB SERPL-MCNC: 0.48 MG/DL (ref 0.2–1)
BUN SERPL-MCNC: 12 MG/DL (ref 5–25)
CALCIUM SERPL-MCNC: 8.9 MG/DL (ref 8.3–10.1)
CHLORIDE SERPL-SCNC: 108 MMOL/L (ref 96–108)
CHOLEST SERPL-MCNC: 157 MG/DL
CO2 SERPL-SCNC: 27 MMOL/L (ref 21–32)
CREAT SERPL-MCNC: 0.58 MG/DL (ref 0.6–1.3)
EOSINOPHIL # BLD AUTO: 0.2 THOUSAND/ÂΜL (ref 0–0.61)
EOSINOPHIL NFR BLD AUTO: 5 % (ref 0–6)
ERYTHROCYTE [DISTWIDTH] IN BLOOD BY AUTOMATED COUNT: 12.7 % (ref 11.6–15.1)
ESTRADIOL SERPL-MCNC: 26.5 PG/ML
FERRITIN SERPL-MCNC: 15 NG/ML (ref 11–307)
FSH SERPL-ACNC: 11.3 MIU/ML
GFR SERPL CREATININE-BSD FRML MDRD: 113 ML/MIN/1.73SQ M
GLUCOSE P FAST SERPL-MCNC: 90 MG/DL (ref 65–99)
HCT VFR BLD AUTO: 34.2 % (ref 34.8–46.1)
HCV AB SER QL: NORMAL
HDLC SERPL-MCNC: 45 MG/DL
HGB BLD-MCNC: 11.3 G/DL (ref 11.5–15.4)
HIV 1+2 AB+HIV1 P24 AG SERPL QL IA: NORMAL
HIV 2 AB SERPL QL IA: NORMAL
HIV1 AB SERPL QL IA: NORMAL
HIV1 P24 AG SERPL QL IA: NORMAL
IMM GRANULOCYTES # BLD AUTO: 0.01 THOUSAND/UL (ref 0–0.2)
IMM GRANULOCYTES NFR BLD AUTO: 0 % (ref 0–2)
IRON SATN MFR SERPL: 20 % (ref 15–50)
IRON SERPL-MCNC: 62 UG/DL (ref 50–170)
LDLC SERPL CALC-MCNC: 100 MG/DL (ref 0–100)
LH SERPL-ACNC: 5.1 MIU/ML
LYMPHOCYTES # BLD AUTO: 1.4 THOUSANDS/ÂΜL (ref 0.6–4.47)
LYMPHOCYTES NFR BLD AUTO: 34 % (ref 14–44)
MCH RBC QN AUTO: 30.1 PG (ref 26.8–34.3)
MCHC RBC AUTO-ENTMCNC: 33 G/DL (ref 31.4–37.4)
MCV RBC AUTO: 91 FL (ref 82–98)
MONOCYTES # BLD AUTO: 0.28 THOUSAND/ÂΜL (ref 0.17–1.22)
MONOCYTES NFR BLD AUTO: 7 % (ref 4–12)
NEUTROPHILS # BLD AUTO: 2.13 THOUSANDS/ÂΜL (ref 1.85–7.62)
NEUTS SEG NFR BLD AUTO: 53 % (ref 43–75)
NRBC BLD AUTO-RTO: 0 /100 WBCS
PLATELET # BLD AUTO: 284 THOUSANDS/UL (ref 149–390)
PMV BLD AUTO: 11 FL (ref 8.9–12.7)
POTASSIUM SERPL-SCNC: 4 MMOL/L (ref 3.5–5.3)
PROT SERPL-MCNC: 7.3 G/DL (ref 6.4–8.4)
RBC # BLD AUTO: 3.75 MILLION/UL (ref 3.81–5.12)
SODIUM SERPL-SCNC: 137 MMOL/L (ref 135–147)
TIBC SERPL-MCNC: 303 UG/DL (ref 250–450)
TRIGL SERPL-MCNC: 61 MG/DL
TSH SERPL DL<=0.05 MIU/L-ACNC: 1.88 UIU/ML (ref 0.45–4.5)
WBC # BLD AUTO: 4.07 THOUSAND/UL (ref 4.31–10.16)

## 2023-06-27 PROCEDURE — 80053 COMPREHEN METABOLIC PANEL: CPT

## 2023-06-27 PROCEDURE — 84443 ASSAY THYROID STIM HORMONE: CPT

## 2023-06-27 PROCEDURE — 86231 EMA EACH IG CLASS: CPT

## 2023-06-27 PROCEDURE — 86364 TISS TRNSGLTMNASE EA IG CLAS: CPT

## 2023-06-27 PROCEDURE — 86258 DGP ANTIBODY EACH IG CLASS: CPT

## 2023-06-27 PROCEDURE — 83550 IRON BINDING TEST: CPT

## 2023-06-27 PROCEDURE — 82728 ASSAY OF FERRITIN: CPT

## 2023-06-27 PROCEDURE — 82670 ASSAY OF TOTAL ESTRADIOL: CPT

## 2023-06-27 PROCEDURE — 87389 HIV-1 AG W/HIV-1&-2 AB AG IA: CPT

## 2023-06-27 PROCEDURE — 80061 LIPID PANEL: CPT

## 2023-06-27 PROCEDURE — 83002 ASSAY OF GONADOTROPIN (LH): CPT

## 2023-06-27 PROCEDURE — 83001 ASSAY OF GONADOTROPIN (FSH): CPT

## 2023-06-27 PROCEDURE — 83540 ASSAY OF IRON: CPT

## 2023-06-27 PROCEDURE — 85025 COMPLETE CBC W/AUTO DIFF WBC: CPT

## 2023-06-27 PROCEDURE — 86803 HEPATITIS C AB TEST: CPT

## 2023-06-27 PROCEDURE — 82784 ASSAY IGA/IGD/IGG/IGM EACH: CPT

## 2023-06-27 PROCEDURE — 36415 COLL VENOUS BLD VENIPUNCTURE: CPT

## 2023-06-28 ENCOUNTER — TELEPHONE (OUTPATIENT)
Dept: OTHER | Facility: OTHER | Age: 43
End: 2023-06-28

## 2023-06-29 ENCOUNTER — APPOINTMENT (OUTPATIENT)
Dept: LAB | Facility: CLINIC | Age: 43
End: 2023-06-29
Payer: COMMERCIAL

## 2023-06-29 ENCOUNTER — TELEMEDICINE (OUTPATIENT)
Dept: FAMILY MEDICINE CLINIC | Facility: CLINIC | Age: 43
End: 2023-06-29
Payer: COMMERCIAL

## 2023-06-29 DIAGNOSIS — D64.9 ANEMIA, UNSPECIFIED TYPE: Primary | ICD-10-CM

## 2023-06-29 DIAGNOSIS — Z87.81 HISTORY OF CLOSED FRACTURE OF NASAL BONES: ICD-10-CM

## 2023-06-29 DIAGNOSIS — Z13.820 SCREENING FOR OSTEOPOROSIS: ICD-10-CM

## 2023-06-29 DIAGNOSIS — K59.1 FUNCTIONAL DIARRHEA: ICD-10-CM

## 2023-06-29 DIAGNOSIS — F32.1 CURRENT MODERATE EPISODE OF MAJOR DEPRESSIVE DISORDER, UNSPECIFIED WHETHER RECURRENT (HCC): ICD-10-CM

## 2023-06-29 PROCEDURE — 83993 ASSAY FOR CALPROTECTIN FECAL: CPT

## 2023-06-29 PROCEDURE — 82653 EL-1 FECAL QUANTITATIVE: CPT

## 2023-06-29 RX ORDER — FERROUS SULFATE TAB EC 324 MG (65 MG FE EQUIVALENT) 324 (65 FE) MG
324 TABLET DELAYED RESPONSE ORAL
Qty: 60 TABLET | Refills: 2 | Status: SHIPPED | OUTPATIENT
Start: 2023-06-29

## 2023-06-29 NOTE — ASSESSMENT & PLAN NOTE
Patient is a strict vegetarian  She does report to taking iron supplements occasionally  Her menses is heavy in the beginning  Reviewed recent iron panel  Ferritin on the low side  Patient does have a history of B12 deficiency  Will add B12 to recent labs  Advised to begin iron supplement every 12 hours  We will repeat CBC in 3 months

## 2023-06-30 LAB
CALPROTECTIN STL-MCNT: 5 UG/G (ref 0–120)
ENDOMYSIUM IGA SER QL: NEGATIVE
GLIADIN PEPTIDE IGA SER-ACNC: 11 UNITS (ref 0–19)
GLIADIN PEPTIDE IGG SER-ACNC: 4 UNITS (ref 0–19)
IGA SERPL-MCNC: 300 MG/DL (ref 87–352)
TTG IGA SER-ACNC: <2 U/ML (ref 0–3)
TTG IGG SER-ACNC: <2 U/ML (ref 0–5)

## 2023-07-03 ENCOUNTER — TELEPHONE (OUTPATIENT)
Dept: GASTROENTEROLOGY | Facility: CLINIC | Age: 43
End: 2023-07-03

## 2023-07-03 LAB — ELASTASE PANC STL-MCNT: 238 UG ELAST./G

## 2023-07-03 NOTE — TELEPHONE ENCOUNTER
----- [facility] at 7/3/2023  9:22 AM EDT -----  Please advise patient that her stool testing and her celiac panel were negative

## 2023-07-12 ENCOUNTER — TELEPHONE (OUTPATIENT)
Dept: BEHAVIORAL/MENTAL HEALTH CLINIC | Facility: CLINIC | Age: 43
End: 2023-07-12

## 2023-07-13 ENCOUNTER — TELEPHONE (OUTPATIENT)
Dept: BEHAVIORAL/MENTAL HEALTH CLINIC | Facility: CLINIC | Age: 43
End: 2023-07-13

## 2023-07-13 NOTE — TELEPHONE ENCOUNTER
Received message asking to reach out to Jag Doyle about DBT therapy. Reached out to Jag Doyle. Jag Doyle is interested in [address]; however, is looking for therapy closer to home. [facility] office is 1 hour away. This writer spoke to therapist, Daniel Zimmer regarding pt referral.   [facility] integrated phone # provided to Jag Doyle [phone].

## 2023-07-18 ENCOUNTER — TELEPHONE (OUTPATIENT)
Dept: PLASTIC SURGERY | Facility: CLINIC | Age: 43
End: 2023-07-18

## 2023-07-18 NOTE — TELEPHONE ENCOUNTER
Called patient to see if she would like to set up an appointment regarding the referral we have. I did make her aware that the Dr in the [facility] area does not do any nose surgery. She asked for our website information so she could look up the other doctors, and stated she will give us a call back. Deferred referral for 1 month.

## 2023-07-19 ENCOUNTER — TELEPHONE (OUTPATIENT)
Dept: FAMILY MEDICINE CLINIC | Facility: CLINIC | Age: 43
End: 2023-07-19

## 2023-07-19 NOTE — TELEPHONE ENCOUNTER
Patient called in stated that she is going to get a mammogram. She forgot to mention that she has implants and wanted to know it you can put in for her to also have a sonogram to make sure there isn't any leakage? Please advise.   Thanks

## 2023-07-20 ENCOUNTER — EVALUATION (OUTPATIENT)
Dept: PHYSICAL THERAPY | Age: 43
End: 2023-07-20
Payer: COMMERCIAL

## 2023-07-20 DIAGNOSIS — N39.3 STRESS INCONTINENCE OF URINE: Primary | ICD-10-CM

## 2023-07-20 DIAGNOSIS — Z98.82 HISTORY OF BILATERAL BREAST IMPLANTS: Primary | ICD-10-CM

## 2023-07-20 PROCEDURE — 97162 PT EVAL MOD COMPLEX 30 MIN: CPT | Performed by: PHYSICAL THERAPIST

## 2023-07-20 NOTE — TELEPHONE ENCOUNTER
Order for breast ultrasound placed. I would encourage to have patient check with insurance company to ensure coverage.
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
## 2023-07-21 DIAGNOSIS — D64.9 ANEMIA, UNSPECIFIED TYPE: ICD-10-CM

## 2023-07-24 RX ORDER — FERROUS SULFATE TAB EC 324 MG (65 MG FE EQUIVALENT) 324 (65 FE) MG
324 TABLET DELAYED RESPONSE ORAL
Qty: 180 TABLET | Refills: 1 | Status: SHIPPED | OUTPATIENT
Start: 2023-07-24

## 2023-07-26 ENCOUNTER — HOSPITAL ENCOUNTER (OUTPATIENT)
Age: 43
Discharge: HOME | End: 2023-07-26
Payer: COMMERCIAL

## 2023-07-26 VITALS — WEIGHT: 178 LBS | HEIGHT: 69 IN | BODY MASS INDEX: 26.36 KG/M2

## 2023-07-26 DIAGNOSIS — Z12.31 ENCOUNTER FOR SCREENING MAMMOGRAM FOR MALIGNANT NEOPLASM OF BREAST: ICD-10-CM

## 2023-07-26 PROCEDURE — 77063 BREAST TOMOSYNTHESIS BI: CPT

## 2023-07-26 PROCEDURE — 77067 SCR MAMMO BI INCL CAD: CPT

## 2023-07-27 ENCOUNTER — APPOINTMENT (OUTPATIENT)
Dept: PHYSICAL THERAPY | Age: 43
End: 2023-07-27
Payer: COMMERCIAL

## 2023-08-01 ENCOUNTER — NON-APPOINTMENT (OUTPATIENT)
Age: 43
End: 2023-08-01

## 2023-08-14 ENCOUNTER — TELEPHONE (OUTPATIENT)
Dept: PSYCHIATRY | Facility: CLINIC | Age: 43
End: 2023-08-14

## 2023-08-14 ENCOUNTER — TELEPHONE (OUTPATIENT)
Age: 43
End: 2023-08-14

## 2023-08-14 NOTE — TELEPHONE ENCOUNTER
Patient called in stating she is running about 15 minutes late due to traffic, she wanted to know if she can still come or does she need to reschedule

## 2023-08-14 NOTE — TELEPHONE ENCOUNTER
Writer called patient and let her know provider would still like for her to come for her appt, patient said thank you so much

## 2023-09-07 ENCOUNTER — OFFICE VISIT (OUTPATIENT)
Dept: PHYSICAL THERAPY | Age: 43
End: 2023-09-07
Payer: COMMERCIAL

## 2023-09-07 DIAGNOSIS — N39.3 STRESS INCONTINENCE OF URINE: Primary | ICD-10-CM

## 2023-09-07 PROCEDURE — 97140 MANUAL THERAPY 1/> REGIONS: CPT | Performed by: PHYSICAL THERAPIST

## 2023-09-07 PROCEDURE — 97110 THERAPEUTIC EXERCISES: CPT | Performed by: PHYSICAL THERAPIST

## 2023-09-07 NOTE — PROGRESS NOTES
Daily Note     Today's date: 2023  Patient name: Thais Bauer  : 1980  MRN: [id number]  Referring provider: FANI Yanes  Dx:   Encounter Diagnosis     ICD-10-CM    1. Stress incontinence of urine  N39.3           Start Time: 1115  Stop Time: 1200  Total time in clinic (min): 45 minutes    Subjective: Offers no new complaints. Saw massage therapy earlier today due to shoulder pain. Is to start PT this week for shoulder. Objective: See treatment diary below      Assessment: Tolerated treatment well. Patient would benefit from continued PT HEP instruction this date. Verbalized and demonstrated understanding. Written handouts provided. Emphasis on PFM coordination and relaxation. Improved tone PFM. Left iliacus tightness with decrease soft tissue mobility left LQ      Plan: Continue per plan of care.       Precautions: anxiety/depression      Manuals            Transverse plane  PP           Direct PFM  PP                                     Neuro Re-Ed                                                                                                        Ther Ex             CRK 5"/5"/5x  eval only 3"/10"/5x  HEP           PFM activation awareness with breath  2'                        KNACK  PP                                                               Ther Activity             Bladder health education PP            PFm education PP            Gait Training                                       Modalities

## 2023-09-13 DIAGNOSIS — F32.1 CURRENT MODERATE EPISODE OF MAJOR DEPRESSIVE DISORDER, UNSPECIFIED WHETHER RECURRENT (HCC): ICD-10-CM

## 2023-09-13 RX ORDER — SERTRALINE HYDROCHLORIDE 25 MG/1
25 TABLET, FILM COATED ORAL EVERY MORNING
Qty: 90 TABLET | Refills: 2 | Status: SHIPPED | OUTPATIENT
Start: 2023-09-13

## 2023-09-20 ENCOUNTER — APPOINTMENT (OUTPATIENT)
Dept: PHYSICAL THERAPY | Age: 43
End: 2023-09-20
Payer: COMMERCIAL

## 2023-09-26 NOTE — PROGRESS NOTES
Daily Note     Today's date: 2023  Patient name: Naomie Iverson  : 1980  MRN: [id number]  Referring provider: FANI Hernandez  Dx:   Encounter Diagnosis     ICD-10-CM    1. Stress incontinence of urine  N39.3                      Subjective: Reduced leakage noted. Improved awareness of tension holding and ability to voluntary relax PFM. Using [facility] prior to sneeze with varying success. Objective: See treatment diary below      Assessment: Tolerated treatment well. Patient exhibited good technique with therapeutic exercises Increased body awareness with improved ability to sense and voluntary relax PFM post activation. HEP additional instruction this date. Verbalized and demonstrated understanding. Written handouts provided. Direct PFM exam shows improved relaxation. Mild increased tone right versus left PFM. No TTP.        Plan: Discharge PT to self care/ HEP     Precautions: anxiety/depression      Manuals           Transverse plane  PP PP          Direct PFM  PP PP                                    Neuro Re-Ed                                                                                                        Ther Ex             CRK 5"/5"/5x  eval only 3"/10"/5x  HEP 3"/10"  5x          PFM activation awareness with breath  2'                        KNACK  PP review                       Ball with Kegel   3"/10x  HEP          TB ER with Kegel   3"/10x  HEP                       Ther Activity             Bladder health education PP            PFm education PP            Gait Training                                       Modalities

## 2023-09-27 ENCOUNTER — OFFICE VISIT (OUTPATIENT)
Dept: PHYSICAL THERAPY | Age: 43
End: 2023-09-27
Payer: COMMERCIAL

## 2023-09-27 DIAGNOSIS — N39.3 STRESS INCONTINENCE OF URINE: Primary | ICD-10-CM

## 2023-09-27 PROCEDURE — 97140 MANUAL THERAPY 1/> REGIONS: CPT | Performed by: PHYSICAL THERAPIST

## 2023-09-27 PROCEDURE — 97110 THERAPEUTIC EXERCISES: CPT | Performed by: PHYSICAL THERAPIST

## 2023-10-18 ENCOUNTER — TELEPHONE (OUTPATIENT)
Dept: PLASTIC SURGERY | Facility: CLINIC | Age: 43
End: 2023-10-18

## 2023-10-18 NOTE — TELEPHONE ENCOUNTER
Tried to Call pt in regards to ref we received for closed fracture of nasal bones. Voicemail was not setup. Patient per crystal should see [facility].

## 2023-12-18 ENCOUNTER — OFFICE VISIT (OUTPATIENT)
Dept: FAMILY MEDICINE CLINIC | Facility: CLINIC | Age: 43
End: 2023-12-18
Payer: COMMERCIAL

## 2023-12-18 ENCOUNTER — APPOINTMENT (OUTPATIENT)
Age: 43
End: 2023-12-18
Payer: COMMERCIAL

## 2023-12-18 VITALS
DIASTOLIC BLOOD PRESSURE: 70 MMHG | HEART RATE: 61 BPM | SYSTOLIC BLOOD PRESSURE: 112 MMHG | WEIGHT: 184.8 LBS | HEIGHT: 69 IN | BODY MASS INDEX: 27.37 KG/M2 | OXYGEN SATURATION: 98 %

## 2023-12-18 DIAGNOSIS — K62.5 RECTAL BLEEDING: ICD-10-CM

## 2023-12-18 DIAGNOSIS — F32.1 CURRENT MODERATE EPISODE OF MAJOR DEPRESSIVE DISORDER, UNSPECIFIED WHETHER RECURRENT (HCC): ICD-10-CM

## 2023-12-18 DIAGNOSIS — D64.9 ANEMIA, UNSPECIFIED TYPE: Primary | ICD-10-CM

## 2023-12-18 DIAGNOSIS — D64.9 ANEMIA, UNSPECIFIED TYPE: ICD-10-CM

## 2023-12-18 DIAGNOSIS — J45.990 EXERCISE-INDUCED ASTHMA: ICD-10-CM

## 2023-12-18 LAB
25(OH)D3 SERPL-MCNC: 37.2 NG/ML (ref 30–100)
BASOPHILS # BLD AUTO: 0.04 THOUSANDS/ÂΜL (ref 0–0.1)
BASOPHILS NFR BLD AUTO: 1 % (ref 0–1)
EOSINOPHIL # BLD AUTO: 0.09 THOUSAND/ÂΜL (ref 0–0.61)
EOSINOPHIL NFR BLD AUTO: 1 % (ref 0–6)
ERYTHROCYTE [DISTWIDTH] IN BLOOD BY AUTOMATED COUNT: 12.6 % (ref 11.6–15.1)
ESTRADIOL SERPL-MCNC: 97.1 PG/ML
FSH SERPL-ACNC: 3.2 MIU/ML
HCT VFR BLD AUTO: 36.1 % (ref 34.8–46.1)
HGB BLD-MCNC: 11.9 G/DL (ref 11.5–15.4)
IMM GRANULOCYTES # BLD AUTO: 0.01 THOUSAND/UL (ref 0–0.2)
IMM GRANULOCYTES NFR BLD AUTO: 0 % (ref 0–2)
LH SERPL-ACNC: 1.6 MIU/ML
LYMPHOCYTES # BLD AUTO: 1.89 THOUSANDS/ÂΜL (ref 0.6–4.47)
LYMPHOCYTES NFR BLD AUTO: 30 % (ref 14–44)
MCH RBC QN AUTO: 30.8 PG (ref 26.8–34.3)
MCHC RBC AUTO-ENTMCNC: 33 G/DL (ref 31.4–37.4)
MCV RBC AUTO: 94 FL (ref 82–98)
MONOCYTES # BLD AUTO: 0.37 THOUSAND/ÂΜL (ref 0.17–1.22)
MONOCYTES NFR BLD AUTO: 6 % (ref 4–12)
NEUTROPHILS # BLD AUTO: 3.97 THOUSANDS/ÂΜL (ref 1.85–7.62)
NEUTS SEG NFR BLD AUTO: 62 % (ref 43–75)
NRBC BLD AUTO-RTO: 0 /100 WBCS
PLATELET # BLD AUTO: 270 THOUSANDS/UL (ref 149–390)
PMV BLD AUTO: 11.1 FL (ref 8.9–12.7)
RBC # BLD AUTO: 3.86 MILLION/UL (ref 3.81–5.12)
TSH SERPL DL<=0.05 MIU/L-ACNC: 1.17 UIU/ML (ref 0.45–4.5)
VIT B12 SERPL-MCNC: 553 PG/ML (ref 180–914)
WBC # BLD AUTO: 6.37 THOUSAND/UL (ref 4.31–10.16)

## 2023-12-18 PROCEDURE — 83001 ASSAY OF GONADOTROPIN (FSH): CPT

## 2023-12-18 PROCEDURE — 82670 ASSAY OF TOTAL ESTRADIOL: CPT

## 2023-12-18 PROCEDURE — 36415 COLL VENOUS BLD VENIPUNCTURE: CPT

## 2023-12-18 PROCEDURE — 83002 ASSAY OF GONADOTROPIN (LH): CPT

## 2023-12-18 PROCEDURE — 82607 VITAMIN B-12: CPT

## 2023-12-18 PROCEDURE — 82306 VITAMIN D 25 HYDROXY: CPT

## 2023-12-18 PROCEDURE — 85025 COMPLETE CBC W/AUTO DIFF WBC: CPT

## 2023-12-18 PROCEDURE — 99214 OFFICE O/P EST MOD 30 MIN: CPT | Performed by: NURSE PRACTITIONER

## 2023-12-18 PROCEDURE — 84443 ASSAY THYROID STIM HORMONE: CPT

## 2023-12-18 PROCEDURE — 83918 ORGANIC ACIDS TOTAL QUANT: CPT

## 2023-12-18 PROCEDURE — 82525 ASSAY OF COPPER: CPT

## 2023-12-18 PROCEDURE — 84630 ASSAY OF ZINC: CPT

## 2023-12-18 RX ORDER — HYDROCORTISONE 25 MG/G
CREAM TOPICAL 2 TIMES DAILY
Qty: 28 G | Refills: 1 | Status: SHIPPED | OUTPATIENT
Start: 2023-12-18

## 2023-12-18 RX ORDER — ALBUTEROL SULFATE 90 UG/1
2 AEROSOL, METERED RESPIRATORY (INHALATION) EVERY 6 HOURS PRN
Qty: 8 G | Refills: 1 | Status: SHIPPED | OUTPATIENT
Start: 2023-12-18

## 2023-12-18 NOTE — PROGRESS NOTES
Assessment/Plan:    Current moderate episode of major depressive disorder (HCC)  Stable.  To continue sertraline 50 mg daily.  Refill provided.  Follow-up in 6 months or sooner if needed.    Anemia  Patient consumes a vegetarian diet.  To obtain labs, will call with results.       Diagnoses and all orders for this visit:    Anemia, unspecified type  -     Copper Level; Future  -     Zinc; Future  -     Vitamin D 25 hydroxy; Future  -     Vitamin B12; Future  -     Methylmalonic acid, serum; Future  -     CBC and differential; Future  -     CBC and differential; Future  -     Comprehensive metabolic panel; Future  -     Methylmalonic acid, serum; Future  -     Vitamin B12; Future  -     Vitamin D 25 hydroxy; Future  -     Copper Level; Future  -     Zinc; Future  -     TSH, 3rd generation with Free T4 reflex; Future  -     Luteinizing hormone; Future  -     Estradiol; Future  -     Follicle stimulating hormone; Future    Current moderate episode of major depressive disorder, unspecified whether recurrent (HCC)  -     CBC and differential; Future  -     Comprehensive metabolic panel; Future  -     Methylmalonic acid, serum; Future  -     Vitamin B12; Future  -     Vitamin D 25 hydroxy; Future  -     Copper Level; Future  -     Zinc; Future  -     TSH, 3rd generation with Free T4 reflex; Future  -     Luteinizing hormone; Future  -     Estradiol; Future  -     Follicle stimulating hormone; Future  -     sertraline (ZOLOFT) 50 mg tablet; Take 1 tablet (50 mg total) by mouth daily    Rectal bleeding  -     hydrocortisone (ANUSOL-HC) 2.5 % rectal cream; Apply topically 2 (two) times a day    Exercise-induced asthma  -     albuterol (Ventolin HFA) 90 mcg/act inhaler; Inhale 2 puffs every 6 (six) hours as needed for wheezing          Subjective:      Patient ID: Kennedy Blake is a 43 y.o. female.    Kennedy presents for a follow-up.  She is requesting lab work.  She consumes a vegetarian diet show she is currently  "concerned about her vitamins as well as her heavy metal levels.  She is thinking about seeing a naturopathic provider.  Otherwise feeling well.        The following portions of the patient's history were reviewed and updated as appropriate: She   Patient Active Problem List    Diagnosis Date Noted    Anemia 06/29/2023    External hemorrhoids 06/26/2023    Current moderate episode of major depressive disorder (HCC) 03/20/2023    Blood in stool 03/20/2023    Stress incontinence of urine 03/20/2023    Weight gain 03/20/2023     Current Outpatient Medications   Medication Sig Dispense Refill    albuterol (Ventolin HFA) 90 mcg/act inhaler Inhale 2 puffs every 6 (six) hours as needed for wheezing 8 g 1    ferrous sulfate 324 (65 Fe) mg TAKE 1 TABLET BY MOUTH 2 TIMES A DAY BEFORE MEALS 180 tablet 1    hydrocortisone (ANUSOL-HC) 2.5 % rectal cream Apply topically 2 (two) times a day 28 g 1    sertraline (ZOLOFT) 50 mg tablet Take 1 tablet (50 mg total) by mouth daily 90 tablet 2    valACYclovir (VALTREX) 1,000 mg tablet Take 2 tablets by mouth every 12 hours x 1 day 10 tablet 1     No current facility-administered medications for this visit.     She has No Known Allergies..    Review of Systems   Constitutional: Negative.    HENT: Negative.     Eyes: Negative.    Respiratory: Negative.     Cardiovascular: Negative.    Gastrointestinal: Negative.    Endocrine: Negative.    Genitourinary: Negative.    Musculoskeletal: Negative.    Skin: Negative.    Allergic/Immunologic: Negative.    Neurological: Negative.    Hematological: Negative.    Psychiatric/Behavioral: Negative.           /70 (BP Location: Left arm, Patient Position: Sitting, Cuff Size: Standard)   Pulse 61   Ht 5' 9\" (1.753 m)   Wt 83.8 kg (184 lb 12.8 oz)   SpO2 98%   BMI 27.29 kg/m²     Objective:     Physical Exam  Vitals and nursing note reviewed.   Constitutional:       General: She is not in acute distress.     Appearance: Normal appearance. She is " well-developed. She is not ill-appearing, toxic-appearing or diaphoretic.   HENT:      Head: Normocephalic and atraumatic.   Eyes:      Conjunctiva/sclera: Conjunctivae normal.   Cardiovascular:      Rate and Rhythm: Normal rate and regular rhythm.      Heart sounds: Normal heart sounds. No murmur heard.  Pulmonary:      Effort: Pulmonary effort is normal. No respiratory distress.      Breath sounds: Normal breath sounds. No wheezing or rales.   Chest:      Chest wall: No tenderness.   Musculoskeletal:      Cervical back: Neck supple.   Neurological:      General: No focal deficit present.      Mental Status: She is alert and oriented to person, place, and time.   Psychiatric:         Mood and Affect: Mood normal.         Behavior: Behavior normal.         Thought Content: Thought content normal.         Judgment: Judgment normal.

## 2023-12-18 NOTE — ASSESSMENT & PLAN NOTE
Stable.  To continue sertraline 50 mg daily.  Refill provided.  Follow-up in 6 months or sooner if needed.

## 2023-12-19 ENCOUNTER — TELEPHONE (OUTPATIENT)
Dept: GASTROENTEROLOGY | Facility: CLINIC | Age: 43
End: 2023-12-19

## 2023-12-19 ENCOUNTER — VBI (OUTPATIENT)
Dept: ADMINISTRATIVE | Facility: OTHER | Age: 43
End: 2023-12-19

## 2023-12-19 NOTE — TELEPHONE ENCOUNTER
----- Message from Chel Shabazz PA-C sent at 12/19/2023  7:20 AM EST -----  Please advise patient this was normal

## 2023-12-20 ENCOUNTER — APPOINTMENT (OUTPATIENT)
Age: 43
End: 2023-12-20
Payer: COMMERCIAL

## 2023-12-20 LAB
ALBUMIN SERPL BCP-MCNC: 4.1 G/DL (ref 3.5–5)
ALP SERPL-CCNC: 45 U/L (ref 34–104)
ALT SERPL W P-5'-P-CCNC: 15 U/L (ref 7–52)
ANION GAP SERPL CALCULATED.3IONS-SCNC: 4 MMOL/L
AST SERPL W P-5'-P-CCNC: 21 U/L (ref 13–39)
BILIRUB SERPL-MCNC: 0.55 MG/DL (ref 0.2–1)
BUN SERPL-MCNC: 10 MG/DL (ref 5–25)
CALCIUM SERPL-MCNC: 8.3 MG/DL (ref 8.4–10.2)
CHLORIDE SERPL-SCNC: 106 MMOL/L (ref 96–108)
CO2 SERPL-SCNC: 26 MMOL/L (ref 21–32)
CREAT SERPL-MCNC: 0.57 MG/DL (ref 0.6–1.3)
GFR SERPL CREATININE-BSD FRML MDRD: 113 ML/MIN/1.73SQ M
GLUCOSE P FAST SERPL-MCNC: 93 MG/DL (ref 65–99)
POTASSIUM SERPL-SCNC: 4.4 MMOL/L (ref 3.5–5.3)
PROT SERPL-MCNC: 6.8 G/DL (ref 6.4–8.4)
SODIUM SERPL-SCNC: 136 MMOL/L (ref 135–147)

## 2023-12-20 PROCEDURE — 36415 COLL VENOUS BLD VENIPUNCTURE: CPT

## 2023-12-20 PROCEDURE — 80053 COMPREHEN METABOLIC PANEL: CPT

## 2023-12-21 ENCOUNTER — TELEPHONE (OUTPATIENT)
Dept: FAMILY MEDICINE CLINIC | Facility: CLINIC | Age: 43
End: 2023-12-21

## 2023-12-21 DIAGNOSIS — Z82.49 FAMILY HISTORY OF PREMATURE CAD: Primary | ICD-10-CM

## 2023-12-21 NOTE — TELEPHONE ENCOUNTER
Spoke with patient- she stated that her mother just suffered a heart attack. She is wondering, are we able to order any cardiac testing for her, such as an Echo or coronary calcium scoring, to see if she has any calcification around her heart? She wants her arteries checked as well. She knows that cardiac related issues can run in the family so she is now concerned and would like herself checked out.    Please advise, thank you!

## 2023-12-23 LAB — METHYLMALONATE SERPL-SCNC: 96 NMOL/L (ref 0–378)

## 2023-12-27 LAB
COPPER SERPL-MCNC: 101 UG/DL (ref 80–158)
ZINC SERPL-MCNC: 77 UG/DL (ref 44–115)

## 2023-12-28 ENCOUNTER — OFFICE VISIT (OUTPATIENT)
Age: 43
End: 2023-12-28
Payer: COMMERCIAL

## 2023-12-28 VITALS
DIASTOLIC BLOOD PRESSURE: 66 MMHG | RESPIRATION RATE: 18 BRPM | BODY MASS INDEX: 26.58 KG/M2 | SYSTOLIC BLOOD PRESSURE: 98 MMHG | WEIGHT: 180 LBS | TEMPERATURE: 97 F | HEART RATE: 57 BPM | OXYGEN SATURATION: 97 %

## 2023-12-28 DIAGNOSIS — J06.9 ACUTE URI: Primary | ICD-10-CM

## 2023-12-28 DIAGNOSIS — R05.1 ACUTE COUGH: ICD-10-CM

## 2023-12-28 LAB
SARS-COV-2 AG UPPER RESP QL IA: NEGATIVE
VALID CONTROL: NORMAL

## 2023-12-28 PROCEDURE — 87811 SARS-COV-2 COVID19 W/OPTIC: CPT

## 2023-12-28 PROCEDURE — 99213 OFFICE O/P EST LOW 20 MIN: CPT | Performed by: PHYSICIAN ASSISTANT

## 2023-12-28 RX ORDER — DEXTROMETHORPHAN HYDROBROMIDE AND PROMETHAZINE HYDROCHLORIDE 15; 6.25 MG/5ML; MG/5ML
5 SYRUP ORAL 4 TIMES DAILY PRN
Qty: 118 ML | Refills: 0 | Status: SHIPPED | OUTPATIENT
Start: 2023-12-28

## 2023-12-28 NOTE — PROGRESS NOTES
[facility]        NAME: Kennedy Blake is a 43 y.o. female  : 1980    MRN: [id number]  DATE: 2023  TIME: 5:33 PM    Assessment and Plan   Acute URI [J06.9]  1. Acute URI  Poct Covid 19 Rapid Antigen Test    promethazine-dextromethorphan (PHENERGAN-DM) 6.25-15 mg/5 mL oral syrup    CANCELED: POCT rapid ANTIGEN strepA      2. Acute cough  Poct Covid 19 Rapid Antigen Test    promethazine-dextromethorphan (PHENERGAN-DM) 6.25-15 mg/5 mL oral syrup            Patient Instructions       Today's COVID test was also negative  I suspect a viral upper respiratory infection with gastroenteritis    Follow up with PCP in 3-5 days.  Proceed to  ER if symptoms worsen.    Chief Complaint     Chief Complaint   Patient presents with    Cold Like Symptoms     Symptoms started 3 days ago. C/ o fever, sweats, body aches, head congestion, phlegm and fatigue. Otc taken. Home covid was negative.          History of Present Illness       Patient reports symptoms began .  Had a home COVID test done on  that was negative, as per patient.  Fever for 1 day.    URI   This is a new problem. The current episode started in the past 7 days. The problem has been gradually improving. The maximum temperature recorded prior to her arrival was 101 - 101.9 F. The fever has been present for Less than 1 day. Associated symptoms include congestion, coughing and rhinorrhea. Pertinent negatives include no abdominal pain, diarrhea (2 hours ago), headaches, nausea, rash or vomiting. She has tried nothing for the symptoms. The treatment provided no relief.       Review of Systems   Review of Systems   Constitutional:  Positive for appetite change. Negative for activity change, chills, fatigue and fever.   HENT:  Positive for congestion, postnasal drip and rhinorrhea.    Respiratory:  Positive for cough.    Gastrointestinal:  Negative for abdominal pain, diarrhea (2 hours ago), nausea and vomiting.   Skin:   Negative for rash.   Neurological:  Negative for headaches.         Current Medications       Current Outpatient Medications:     albuterol (Ventolin HFA) 90 mcg/act inhaler, Inhale 2 puffs every 6 (six) hours as needed for wheezing, Disp: 8 g, Rfl: 1    ferrous sulfate 324 (65 Fe) mg, TAKE 1 TABLET BY MOUTH 2 TIMES A DAY BEFORE MEALS, Disp: 180 tablet, Rfl: 1    hydrocortisone (ANUSOL-HC) 2.5 % rectal cream, Apply topically 2 (two) times a day, Disp: 28 g, Rfl: 1    promethazine-dextromethorphan (PHENERGAN-DM) 6.25-15 mg/5 mL oral syrup, Take 5 mL by mouth 4 (four) times a day as needed for cough, Disp: 118 mL, Rfl: 0    sertraline (ZOLOFT) 50 mg tablet, Take 1 tablet (50 mg total) by mouth daily, Disp: 90 tablet, Rfl: 2    valACYclovir (VALTREX) 1,000 mg tablet, Take 2 tablets by mouth every 12 hours x 1 day, Disp: 10 tablet, Rfl: 1    Current Allergies     Allergies as of 12/28/2023    (No Known Allergies)            The following portions of the patient's history were reviewed and updated as appropriate: allergies, current medications, past family history, past medical history, past social history, past surgical history and problem list.     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        Objective   BP 98/66   Pulse 57   Temp (!) 97 °F (36.1 °C)   Resp 18   Wt 81.6 kg (180 lb)   SpO2 97%   BMI 26.58 kg/m²        Physical Exam     Physical Exam  Vitals and nursing note reviewed.   Constitutional:       General: She is not in acute distress.     Appearance: Normal appearance. She is not  ill-appearing, toxic-appearing or diaphoretic.   HENT:      Right Ear: Tympanic membrane, ear canal and external ear normal.      Left Ear: Tympanic membrane, ear canal and external ear normal.      Nose: Congestion and rhinorrhea present.      Mouth/Throat:      Mouth: Mucous membranes are moist.      Pharynx: Oropharynx is clear. No oropharyngeal exudate or posterior oropharyngeal erythema.   Eyes:      General: No scleral icterus.     Extraocular Movements: Extraocular movements intact.      Conjunctiva/sclera: Conjunctivae normal.      Pupils: Pupils are equal, round, and reactive to light.   Cardiovascular:      Rate and Rhythm: Normal rate and regular rhythm.      Pulses: Normal pulses.      Heart sounds: Normal heart sounds.   Pulmonary:      Effort: Pulmonary effort is normal. No respiratory distress.      Breath sounds: Normal breath sounds.   Musculoskeletal:         General: Normal range of motion.      Cervical back: Normal range of motion and neck supple. No tenderness.   Lymphadenopathy:      Cervical: No cervical adenopathy.   Skin:     General: Skin is warm and dry.   Neurological:      General: No focal deficit present.      Mental Status: She is alert and oriented to person, place, and time.      Coordination: Coordination normal.      Gait: Gait normal.   Psychiatric:         Mood and Affect: Mood normal.         Behavior: Behavior normal.

## 2023-12-28 NOTE — LETTER
December 28, 2023     Patient: Kennedy Blake   YOB: 1980   Date of Visit: 12/28/2023       To Whom it May Concern:    Kennedy Blake was seen in my clinic on 12/28/2023. She may return to work on 12/29/2023 .    If you have any questions or concerns, please don't hesitate to call.         Sincerely,          MARIXA YOUSSEF        CC: No Recipients

## 2023-12-28 NOTE — PATIENT INSTRUCTIONS
Acute Nausea and Vomiting   WHAT YOU NEED TO KNOW:   Acute means the nausea and vomiting starts suddenly, gets worse quickly, and lasts a short time. There are many possible causes of acute nausea and vomiting.  DISCHARGE INSTRUCTIONS:   Call your local emergency number (911 in the [place]) if:   You have chest pain.    You have severe pain or cramping in your abdomen.    Your vision is blurred.    You are confused, have a high fever, or a stiff neck.    You have bright red blood coming from your rectum.    Your vomit smells like bowel movement.    Return to the emergency department if:   You have a severe headache or pain.    You are dizzy, cold, and thirsty, and your eyes and mouth are dry.    You are urinating very little or not at all.    You are dizzy or lightheaded when you stand up.    You see blood or material that looks like coffee grounds in your vomit.    Call your doctor if:   You continue to vomit for more than 48 hours.    Your nausea and vomiting does not get better or go away after you use medicine.    You have questions or concerns about your condition or care.    Medicines:  You may need any of the following:  Medicines  may be given to calm your stomach and stop your vomiting. You may also need medicines to help empty your stomach and bowels.    Take your medicine as directed.  Contact your healthcare provider if you think your medicine is not helping or if you have side effects. Tell your provider if you are allergic to any medicine. Keep a list of the medicines, vitamins, and herbs you take. Include the amounts, and when and why you take them. Bring the list or the pill bottles to follow-up visits. Carry your medicine list with you in case of an emergency.    Manage your symptoms:   Rest as much as you can.  Too much activity can make your nausea worse.    Drink more liquids to prevent dehydration.  Take small sips. Try drinks such as ginger ale, lemonade, water, or tea. Your provider may recommend  that you drink an oral rehydration solution (ORS). ORS contains water, salts, and sugar that are needed to replace the lost body fluids.    Eat smaller meals, more often.  Try bland foods and avoid spices or strong flavors    Do not drink alcohol.  Alcohol may upset or irritate your stomach.    Follow up with your doctor as directed:  Write down your questions so you remember to ask them during your visits.  © Copyright [employer] 2023 Information is for End User's use only and may not be sold, redistributed or otherwise used for commercial purposes.  The above information is an  only. It is not intended as medical advice for individual conditions or treatments. Talk to your doctor, nurse or pharmacist before following any medical regimen to see if it is safe and effective for you.  Cold Symptoms   WHAT YOU NEED TO KNOW:   A cold is an infection caused by a virus. The infection causes your upper respiratory system to become inflamed. Common symptoms of a cold include sneezing, dry throat, a stuffy nose, headache, watery eyes, and a cough. Your cough may be dry, or you may cough up mucus. You may also have muscle aches, joint pain, and tiredness. Rarely, you may have a fever. Most colds go away without treatment.  DISCHARGE INSTRUCTIONS:   Return to the emergency department if:   You have increased tiredness and weakness.    You are unable to eat.     Your heart is beating much faster than usual for you.     You see white spots in the back of your throat and your neck is swollen and sore to the touch.     You see pinpoint or larger reddish-purple dots on your skin.    Contact your healthcare provider if:   You have a fever higher than 102°F (38.9°C).    You have new or worsening shortness of breath.     You have thick nasal drainage for more than 2 days.     Your symptoms do not improve or get worse within 5 days.     You have questions or concerns about your condition or care.    Medicines:  The  following medicines may be suggested by your healthcare provider to decrease your cold symptoms. These medicines are available without a doctor's order. Ask which medicines to take and when to take them. Follow directions.  NSAIDs or acetaminophen  help to bring down a fever or decrease pain.    Decongestants  help decrease nasal stuffiness.     Antihistamines  help decrease sneezing and a runny nose.     Cough suppressants  help decrease how much you cough.    Expectorants  help loosen mucus so you can cough it up.    Take your medicine as directed.  Contact your healthcare provider if you think your medicine is not helping or if you have side effects. Tell your provider if you are allergic to any medicine. Keep a list of the medicines, vitamins, and herbs you take. Include the amounts, and when and why you take them. Bring the list or the pill bottles to follow-up visits. Carry your medicine list with you in case of an emergency.    Symptom relief:  The following may help relieve cold symptoms, such as a dry throat and congestion:  Gargle with mouthwash or warm salt water as directed.     Suck on throat lozenges or hard candy.     Use a cold or warm vaporizer or humidifier to ease your breathing.     Rest for at least 2 days and then as needed to decrease tiredness and weakness.     Use petroleum based jelly around your nostrils to decrease irritation from blowing your nose.    Drink liquids:  Liquids will help thin and loosen thick mucus so you can cough it up. Liquids will also keep you hydrated. Ask your healthcare provider which liquids are best for you and how much to drink each day.  Prevent the spread of germs:  You can spread your cold germs to others for at least 3 days after your symptoms start. Wash your hands often. Do not share items, such as eating utensils. Cover your nose and mouth when you cough or sneeze using the crook of your elbow instead of your hands. Throw used tissues in the garbage.  Do  not smoke:  Smoking may worsen your symptoms and increase the length of time you feel sick. Talk with your healthcare provider if you need help to stop smoking.  Follow up with your doctor as directed:  Write down your questions so you remember to ask them during your visits.  © Copyright [employer] 2023 Information is for End User's use only and may not be sold, redistributed or otherwise used for commercial purposes.  The above information is an  only. It is not intended as medical advice for individual conditions or treatments. Talk to your doctor, nurse or pharmacist before following any medical regimen to see if it is safe and effective for you.

## 2024-01-24 ENCOUNTER — OFFICE VISIT (OUTPATIENT)
Dept: FAMILY MEDICINE CLINIC | Facility: CLINIC | Age: 44
End: 2024-01-24
Payer: COMMERCIAL

## 2024-01-24 VITALS
SYSTOLIC BLOOD PRESSURE: 110 MMHG | HEART RATE: 89 BPM | WEIGHT: 179.2 LBS | OXYGEN SATURATION: 99 % | BODY MASS INDEX: 26.54 KG/M2 | TEMPERATURE: 97.8 F | DIASTOLIC BLOOD PRESSURE: 70 MMHG | HEIGHT: 69 IN

## 2024-01-24 DIAGNOSIS — F32.1 CURRENT MODERATE EPISODE OF MAJOR DEPRESSIVE DISORDER, UNSPECIFIED WHETHER RECURRENT (HCC): Primary | ICD-10-CM

## 2024-01-24 DIAGNOSIS — S31.41XA TEAR OF LABIUM, INITIAL ENCOUNTER: Primary | ICD-10-CM

## 2024-01-24 PROCEDURE — 87186 SC STD MICRODIL/AGAR DIL: CPT

## 2024-01-24 PROCEDURE — 87205 SMEAR GRAM STAIN: CPT

## 2024-01-24 PROCEDURE — 99214 OFFICE O/P EST MOD 30 MIN: CPT

## 2024-01-24 PROCEDURE — 87070 CULTURE OTHR SPECIMN AEROBIC: CPT

## 2024-01-24 PROCEDURE — 87147 CULTURE TYPE IMMUNOLOGIC: CPT

## 2024-01-24 RX ORDER — AZELASTINE HYDROCHLORIDE 137 UG/1
SPRAY, METERED NASAL
COMMUNITY
Start: 2024-01-10

## 2024-01-24 RX ORDER — SERTRALINE HYDROCHLORIDE 25 MG/1
25 TABLET, FILM COATED ORAL
Qty: 90 TABLET | Refills: 3 | Status: SHIPPED | OUTPATIENT
Start: 2024-01-24 | End: 2025-01-18

## 2024-01-24 NOTE — PROGRESS NOTES
Assessment/Plan:         Problem List Items Addressed This Visit    None  Visit Diagnoses     Tear of labium, initial encounter    -  Primary    mupirocin up to 3 times daily, recommend no soak, just warm water. if not improvement will consider topical estrogen.    Relevant Medications    mupirocin (BACTROBAN) 2 % ointment              Subjective:      Patient ID: Kennedy Blake is a 43 y.o. female.    Kennedy is here for vaginal soreness on the outside. She has sensitivity to the perineal area for 3 weeks. She saw gyn and is under treatment for BV.         The following portions of the patient's history were reviewed and updated as appropriate:   Past Medical History:  She has a past medical history of BRCA1 negative and BRCA2 negative.,  _______________________________________________________________________  Medical Problems:  does not have any pertinent problems on file.,  _______________________________________________________________________  Past Surgical History:   has a past surgical history that includes AUGMENTATION BREAST (Bilateral) and Augmentation mammaplasty.,  _______________________________________________________________________  Family History:  family history includes Breast cancer in her maternal grandmother; Coronary artery disease in her maternal grandfather; Diabetes in her father and mother; Hypertension in her mother; Lymphoma in her father.,  _______________________________________________________________________  Social History:   reports that she has never smoked. She has never used smokeless tobacco. She reports that she does not drink alcohol and does not use drugs.,  _______________________________________________________________________  Allergies:  has No Known Allergies..  _______________________________________________________________________  Current Outpatient Medications   Medication Sig Dispense Refill   • albuterol (Ventolin HFA) 90 mcg/act inhaler Inhale 2 puffs every 6  "(six) hours as needed for wheezing 8 g 1   • Azelastine HCl 137 MCG/SPRAY SOLN      • ferrous sulfate 324 (65 Fe) mg TAKE 1 TABLET BY MOUTH 2 TIMES A DAY BEFORE MEALS 180 tablet 1   • hydrocortisone (ANUSOL-HC) 2.5 % rectal cream Apply topically 2 (two) times a day 28 g 1   • mupirocin (BACTROBAN) 2 % ointment Apply topically 3 (three) times a day 60 g 1   • promethazine-dextromethorphan (PHENERGAN-DM) 6.25-15 mg/5 mL oral syrup Take 5 mL by mouth 4 (four) times a day as needed for cough 118 mL 0   • sertraline (ZOLOFT) 50 mg tablet Take 1 tablet (50 mg total) by mouth daily 90 tablet 2   • valACYclovir (VALTREX) 1,000 mg tablet Take 2 tablets by mouth every 12 hours x 1 day 10 tablet 1     No current facility-administered medications for this visit.     _______________________________________________________________________  Review of Systems   Constitutional:  Negative for chills, diaphoresis and fever.   HENT:  Negative for congestion, ear pain, postnasal drip, rhinorrhea, sinus pressure, sinus pain and sore throat.    Eyes:  Negative for pain and visual disturbance.   Respiratory:  Negative for cough, chest tightness, shortness of breath and wheezing.    Cardiovascular:  Negative for chest pain and palpitations.   Gastrointestinal:  Negative for abdominal pain, constipation, diarrhea, nausea and vomiting.   Genitourinary:  Positive for vaginal pain. Negative for dysuria, frequency and hematuria.   Musculoskeletal:  Negative for arthralgias, back pain and myalgias.   Skin:  Negative for color change and rash.   All other systems reviewed and are negative.        Objective:  Vitals:    01/24/24 1006   BP: 110/70   BP Location: Left arm   Patient Position: Sitting   Pulse: 89   Temp: 97.8 °F (36.6 °C)   SpO2: 99%   Weight: 81.3 kg (179 lb 3.2 oz)   Height: 5' 9\" (1.753 m)     Body mass index is 26.46 kg/m².     Physical Exam  Vitals and nursing note reviewed.   Constitutional:       General: She is not in acute " distress.     Appearance: Normal appearance. She is not ill-appearing.   HENT:      Head: Normocephalic.      Right Ear: Tympanic membrane, ear canal and external ear normal. There is no impacted cerumen.      Left Ear: Tympanic membrane, ear canal and external ear normal. There is no impacted cerumen.      Nose: Nose normal. No congestion.      Mouth/Throat:      Mouth: Mucous membranes are moist.      Pharynx: No posterior oropharyngeal erythema.   Eyes:      Extraocular Movements: Extraocular movements intact.      Conjunctiva/sclera: Conjunctivae normal.      Pupils: Pupils are equal, round, and reactive to light.   Cardiovascular:      Rate and Rhythm: Normal rate and regular rhythm.      Pulses: Normal pulses.      Heart sounds: Normal heart sounds.   Pulmonary:      Effort: Pulmonary effort is normal. No respiratory distress.      Breath sounds: Normal breath sounds. No wheezing.   Abdominal:      General: Bowel sounds are normal.      Palpations: Abdomen is soft.      Hernia: There is no hernia in the left inguinal area or right inguinal area.   Genitourinary:     Pubic Area: No rash or pubic lice.       Kyle stage (genital): 5.      Comments: Skin tear on left labia  Musculoskeletal:         General: No swelling or tenderness. Normal range of motion.      Cervical back: Normal range of motion. No tenderness.      Right lower leg: No edema.      Left lower leg: No edema.   Lymphadenopathy:      Cervical: No cervical adenopathy.      Lower Body: No right inguinal adenopathy. No left inguinal adenopathy.   Skin:     General: Skin is warm and dry.   Neurological:      General: No focal deficit present.      Mental Status: She is alert and oriented to person, place, and time.   Psychiatric:         Mood and Affect: Mood normal.         Behavior: Behavior normal.         Thought Content: Thought content normal.         Judgment: Judgment normal.

## 2024-01-24 NOTE — PATIENT INSTRUCTIONS
Problem List Items Addressed This Visit    None  Visit Diagnoses       Tear of labium, initial encounter    -  Primary    mupirocin up to 3 times daily, recommend no soak, just warm water. if not improvement will consider topical estrogen.    Relevant Medications    mupirocin (BACTROBAN) 2 % ointment

## 2024-01-25 ENCOUNTER — TELEPHONE (OUTPATIENT)
Dept: FAMILY MEDICINE CLINIC | Facility: CLINIC | Age: 44
End: 2024-01-25

## 2024-01-25 NOTE — TELEPHONE ENCOUNTER
Patient returned call and was made aware of Tia's message and she will also let Tia know if there is any improvement.  Thanks

## 2024-01-26 ENCOUNTER — TELEPHONE (OUTPATIENT)
Dept: FAMILY MEDICINE CLINIC | Facility: CLINIC | Age: 44
End: 2024-01-26

## 2024-01-26 DIAGNOSIS — S31.41XA TEAR OF LABIUM, INITIAL ENCOUNTER: Primary | ICD-10-CM

## 2024-01-26 LAB
BACTERIA WND AEROBE CULT: ABNORMAL
BACTERIA WND AEROBE CULT: ABNORMAL
GRAM STN SPEC: ABNORMAL
GRAM STN SPEC: ABNORMAL

## 2024-01-26 RX ORDER — SULFAMETHOXAZOLE AND TRIMETHOPRIM 800; 160 MG/1; MG/1
1 TABLET ORAL EVERY 12 HOURS SCHEDULED
Qty: 10 TABLET | Refills: 0 | Status: SHIPPED | OUTPATIENT
Start: 2024-01-26 | End: 2024-01-31

## 2024-01-26 NOTE — TELEPHONE ENCOUNTER
Spoke with Radiology at [facility]. They stated that patient is coming in for a CTA on 02/16 and they said that patient should be prescribed a beta blocker to be taken 1 hour prior to the exam so the patient's heart rate is rested.    Please send this to patient's pharmacy. Please advise, thank you!

## 2024-02-01 DIAGNOSIS — Z01.818 PREPROCEDURAL EXAMINATION: Primary | ICD-10-CM

## 2024-02-01 RX ORDER — METOPROLOL TARTRATE 100 MG/1
100 TABLET ORAL ONCE
Qty: 1 TABLET | Refills: 0 | Status: SHIPPED | OUTPATIENT
Start: 2024-02-01 | End: 2024-02-01

## 2024-02-06 DIAGNOSIS — D64.9 ANEMIA, UNSPECIFIED TYPE: ICD-10-CM

## 2024-02-06 RX ORDER — FERROUS SULFATE 324(65)MG
324 TABLET, DELAYED RELEASE (ENTERIC COATED) ORAL
Qty: 180 TABLET | Refills: 1 | Status: SHIPPED | OUTPATIENT
Start: 2024-02-06

## 2024-02-16 ENCOUNTER — HOSPITAL ENCOUNTER (OUTPATIENT)
Dept: CT IMAGING | Facility: HOSPITAL | Age: 44
Discharge: HOME | End: 2024-02-16

## 2024-03-04 ENCOUNTER — OFFICE VISIT (OUTPATIENT)
Dept: OBGYN CLINIC | Facility: OTHER | Age: 44
End: 2024-03-04
Payer: COMMERCIAL

## 2024-03-04 ENCOUNTER — APPOINTMENT (OUTPATIENT)
Dept: RADIOLOGY | Facility: OTHER | Age: 44
End: 2024-03-04
Payer: COMMERCIAL

## 2024-03-04 VITALS
HEIGHT: 69 IN | HEART RATE: 58 BPM | BODY MASS INDEX: 26.51 KG/M2 | SYSTOLIC BLOOD PRESSURE: 117 MMHG | DIASTOLIC BLOOD PRESSURE: 79 MMHG | WEIGHT: 179 LBS

## 2024-03-04 DIAGNOSIS — M67.912 DYSFUNCTION OF LEFT ROTATOR CUFF: ICD-10-CM

## 2024-03-04 DIAGNOSIS — M25.512 ACUTE PAIN OF LEFT SHOULDER: ICD-10-CM

## 2024-03-04 DIAGNOSIS — M75.42 SUBACROMIAL IMPINGEMENT OF LEFT SHOULDER: Primary | ICD-10-CM

## 2024-03-04 PROCEDURE — 99204 OFFICE O/P NEW MOD 45 MIN: CPT | Performed by: ORTHOPAEDIC SURGERY

## 2024-03-04 PROCEDURE — 73030 X-RAY EXAM OF SHOULDER: CPT

## 2024-03-04 PROCEDURE — 20610 DRAIN/INJ JOINT/BURSA W/O US: CPT | Performed by: ORTHOPAEDIC SURGERY

## 2024-03-04 RX ORDER — BETAMETHASONE SODIUM PHOSPHATE AND BETAMETHASONE ACETATE 3; 3 MG/ML; MG/ML
6 INJECTION, SUSPENSION INTRA-ARTICULAR; INTRALESIONAL; INTRAMUSCULAR; SOFT TISSUE
Status: COMPLETED | OUTPATIENT
Start: 2024-03-04 | End: 2024-03-04

## 2024-03-04 RX ORDER — BUPIVACAINE HYDROCHLORIDE 2.5 MG/ML
2 INJECTION, SOLUTION INFILTRATION; PERINEURAL
Status: COMPLETED | OUTPATIENT
Start: 2024-03-04 | End: 2024-03-04

## 2024-03-04 RX ADMIN — BETAMETHASONE SODIUM PHOSPHATE AND BETAMETHASONE ACETATE 6 MG: 3; 3 INJECTION, SUSPENSION INTRA-ARTICULAR; INTRALESIONAL; INTRAMUSCULAR; SOFT TISSUE at 10:45

## 2024-03-04 RX ADMIN — BUPIVACAINE HYDROCHLORIDE 2 ML: 2.5 INJECTION, SOLUTION INFILTRATION; PERINEURAL at 10:45

## 2024-03-04 NOTE — PROGRESS NOTES
Assessment  Diagnoses and all orders for this visit:    Subacromial impingement of left shoulder    Discussion and Plan:    The patient has an examination consistent with subacromial impingement syndrome of the left shoulder.  I have discussed with the patient the pathophysiology of this diagnosis and reviewed how the examination correlates with this diagnosis.    Her MRI was reviewed today which does not reveal a rotator cuff tear.   Treatment options were discussed at length and after discussing these treatment options, the patient elected for and received a subacromial injection of corticosteroid (as described in the procedure note) with a prescription for referral to physical therapy.    We will reevaluate the patient in 6-8 weeks.  If the symptoms fail to improve with this treatment the patient would be indicated for a diagnostic arthroscopy to further evaluate the rotator cuff    Subjective:   Patient ID: Kennedy Blake is a 43 y.o. female    The patient presents with a chief complaint of left shoulder pain.   The pain began several month(s) ago and is not associated with an acute injury. The patient describes the pain as aching and dull in intensity,  intermittent, occurring with increasing frequency in timing, and localizes the pain to the  left subacromial joint, deltoid.  The pain is worse with overhead work, overuse, and raising arm over head and relieved by rest, ice, avoiding the painful activities.  The pain is not associated with numbness and tingling.  The pain is not associated with constitutional symptoms. The patient is not awoken at night by the pain.    The patient has had prior treatment in the form of formal PT in May of 2023 ordered by Dr Cruz with benefit but her shoulder symptoms have returned     The following portions of the patient's history were reviewed and updated as appropriate: allergies, current medications, past family history, past medical history, past social history,  "past surgical history and problem list.    Objective:  Ht 5' 9\" (1.753 m)   BMI 26.46 kg/m²       Left Shoulder Exam     Range of Motion   The patient has normal left shoulder ROM.    Muscle Strength   Abduction: 4/5   External rotation: 5/5     Tests   Martínez test: positive  Impingement: positive    Other   Erythema: absent  Sensation: normal  Pulse: present             Physical Exam  Constitutional:       Appearance: She is well-developed.   Eyes:      Pupils: Pupils are equal, round, and reactive to light.   Pulmonary:      Effort: Pulmonary effort is normal.      Breath sounds: Normal breath sounds.   Skin:     General: Skin is warm and dry.   Neurological:      Mental Status: She is alert and oriented to person, place, and time.   Psychiatric:         Behavior: Behavior normal.         Thought Content: Thought content normal.         Judgment: Judgment normal.         Large joint arthrocentesis: L subacromial bursa  Universal Protocol:  Consent: Verbal consent obtained.  Risks and benefits: risks, benefits and alternatives were discussed  Consent given by: patient  Time out: Immediately prior to procedure a \"time out\" was called to verify the correct patient, procedure, equipment, support staff and site/side marked as required.  Site marked: the operative site was marked  Radiology Images displayed and confirmed. If images not available, report reviewed: imaging studies available  Patient identity confirmed: verbally with patient  Supporting Documentation  Indications: pain and diagnostic evaluation   Procedure Details  Location: shoulder - L subacromial bursa  Preparation: Patient was prepped and draped in the usual sterile fashion  Needle size: 22 G  Ultrasound guidance: no  Approach: lateral  Medications administered: 2 mL bupivacaine 0.25 %; 6 mg betamethasone acetate-betamethasone sodium phosphate 6 (3-3) mg/mL    Patient tolerance: patient tolerated the procedure well with no immediate " complications  Dressing:  Sterile dressing applied        I have personally reviewed pertinent films in PACS and my interpretation is as follows.    MRI Left Shoulder 8/8/23: Supraspinatus tendinosis without rotator cuff tear    X Ray Left Shoulder 3/4/24: No acute osseous abnormalities or degenerative changes    I did review the report of the MRI scan from an upright/open scanner from [facility] which confirms my clinical review of the images of tendinitis of the supraspinatus with no cuff tear    Scribe Attestation      I,:  Oscar Davis am acting as a scribe while in the presence of the attending physician.:       I,:  Neftali Velasquez MD personally performed the services described in this documentation    as scribed in my presence.:

## 2024-03-06 ENCOUNTER — OFFICE VISIT (OUTPATIENT)
Dept: FAMILY MEDICINE CLINIC | Facility: CLINIC | Age: 44
End: 2024-03-06
Payer: COMMERCIAL

## 2024-03-06 ENCOUNTER — APPOINTMENT (OUTPATIENT)
Dept: LAB | Facility: HOSPITAL | Age: 44
End: 2024-03-06
Payer: COMMERCIAL

## 2024-03-06 VITALS
TEMPERATURE: 97.8 F | HEART RATE: 62 BPM | HEIGHT: 69 IN | OXYGEN SATURATION: 100 % | BODY MASS INDEX: 27.19 KG/M2 | DIASTOLIC BLOOD PRESSURE: 74 MMHG | WEIGHT: 183.6 LBS | SYSTOLIC BLOOD PRESSURE: 112 MMHG

## 2024-03-06 DIAGNOSIS — R30.0 DYSURIA: Primary | ICD-10-CM

## 2024-03-06 DIAGNOSIS — N89.8 VAGINAL IRRITATION: ICD-10-CM

## 2024-03-06 DIAGNOSIS — N89.8 VAGINAL DISCHARGE: ICD-10-CM

## 2024-03-06 LAB
EXT PREGNANCY TEST URINE: NEGATIVE
EXT. CONTROL: NORMAL
SL AMB  POCT GLUCOSE, UA: ABNORMAL
SL AMB LEUKOCYTE ESTERASE,UA: ABNORMAL
SL AMB POCT BILIRUBIN,UA: ABNORMAL
SL AMB POCT BLOOD,UA: ABNORMAL
SL AMB POCT CLARITY,UA: ABNORMAL
SL AMB POCT COLOR,UA: YELLOW
SL AMB POCT KETONES,UA: ABNORMAL
SL AMB POCT NITRITE,UA: ABNORMAL
SL AMB POCT PH,UA: 6
SL AMB POCT SPECIFIC GRAVITY,UA: 1.01
SL AMB POCT URINE PROTEIN: ABNORMAL
SL AMB POCT UROBILINOGEN: 3.5

## 2024-03-06 PROCEDURE — 81514 NFCT DS BV&VAGINITIS DNA ALG: CPT | Performed by: NURSE PRACTITIONER

## 2024-03-06 PROCEDURE — 87491 CHLMYD TRACH DNA AMP PROBE: CPT | Performed by: NURSE PRACTITIONER

## 2024-03-06 PROCEDURE — 87591 N.GONORRHOEAE DNA AMP PROB: CPT | Performed by: NURSE PRACTITIONER

## 2024-03-06 PROCEDURE — 87086 URINE CULTURE/COLONY COUNT: CPT | Performed by: NURSE PRACTITIONER

## 2024-03-06 PROCEDURE — 81002 URINALYSIS NONAUTO W/O SCOPE: CPT | Performed by: NURSE PRACTITIONER

## 2024-03-06 PROCEDURE — 99214 OFFICE O/P EST MOD 30 MIN: CPT | Performed by: NURSE PRACTITIONER

## 2024-03-06 RX ORDER — METRONIDAZOLE 7.5 MG/G
GEL VAGINAL
COMMUNITY
Start: 2023-12-22 | End: 2024-03-06 | Stop reason: ALTCHOICE

## 2024-03-06 RX ORDER — CICLOPIROX 80 MG/ML
SOLUTION TOPICAL
COMMUNITY
Start: 2024-02-16

## 2024-03-06 RX ORDER — METRONIDAZOLE 500 MG/1
TABLET ORAL
COMMUNITY
Start: 2024-01-22 | End: 2024-03-06 | Stop reason: ALTCHOICE

## 2024-03-06 NOTE — PROGRESS NOTES
Name: Kennedy Blake      : 1980      MRN: [id number]  Encounter Provider: FANI Hall  Encounter Date: 3/6/2024   Encounter department: [facility] [address]    Assessment & Plan     1. Dysuria  Comments:  Advised increase hydration with water, cranberry tabs/juice, void after intercourse, bathroom hygiene, will treat pending results of urine culture.  Orders:  -     POCT urine dip  -     POCT pregnancy, urine  -     Urine culture; Future    2. Vaginal discharge  Comments:  Vaginal molecular panel obtained in office today.  Advised engaging in safe sex practices, voiding after intercourse.  Orders:  -     Molecular Vaginal Panel; Future  -     Chlamydia/GC amplified DNA by PCR; Future  -     Molecular Vaginal Panel  -     Chlamydia/GC amplified DNA by PCR    3. Vaginal irritation  Comments:  Advised to avoid any perfumes, dyes.  Use mild soap and water, apply A&D ointment twice daily to labial folds.           Subjective      Patient presents to the office for evaluation of UTI symptoms and vaginal discharge.  UTI symptoms started yesterday.  As per patient, noted dysuria, urgency, frequency.  Patient denies fever, chills, hematuria, back pain, flank pain    Observed vaginal irritation and vaginal discharge a week ago.  Patient notes thick white discharge.  Patient denies new sexual partners.  Patient denies vaginal lesions, pelvic pain, vaginal bleeding, pain with intercourse.        Review of Systems   Constitutional:  Negative for chills and fever.   HENT:  Negative for sore throat and trouble swallowing.    Eyes:  Negative for photophobia and visual disturbance.   Respiratory:  Negative for cough and shortness of breath.    Cardiovascular:  Negative for chest pain and palpitations.   Gastrointestinal:  Negative for abdominal pain, nausea and vomiting.   Genitourinary:  Positive for dysuria, frequency, urgency, vaginal discharge and vaginal pain.  Negative for decreased urine volume, flank pain, genital sores, hematuria, pelvic pain and vaginal bleeding.   Musculoskeletal:  Negative for arthralgias, back pain and myalgias.   Skin:  Negative for color change and rash.   Neurological:  Negative for dizziness, light-headedness, numbness and headaches.   Hematological:  Negative for adenopathy.   Psychiatric/Behavioral:  Negative for confusion.        Current Outpatient Medications on File Prior to Visit   Medication Sig    albuterol (Ventolin HFA) 90 mcg/act inhaler Inhale 2 puffs every 6 (six) hours as needed for wheezing    Azelastine HCl 137 MCG/SPRAY SOLN     ciclopirox (PENLAC) 8 % solution APPLY TO NAILS DAILY - REMOVE ON 7TH DAY WITH POLISH REMOVER    ferrous sulfate 324 (65 Fe) mg TAKE 1 TABLET BY MOUTH 2 TIMES A DAY BEFORE MEALS.    hydrocortisone (ANUSOL-HC) 2.5 % rectal cream Apply topically 2 (two) times a day    promethazine-dextromethorphan (PHENERGAN-DM) 6.25-15 mg/5 mL oral syrup Take 5 mL by mouth 4 (four) times a day as needed for cough    sertraline (ZOLOFT) 25 mg tablet Take 1 tablet (25 mg total) by mouth daily at bedtime    sertraline (ZOLOFT) 50 mg tablet Take 1 tablet (50 mg total) by mouth daily    metoprolol tartrate (LOPRESSOR) 100 mg tablet Take 1 tablet (100 mg total) by mouth once for 1 dose (Patient not taking: Reported on 3/6/2024)    mupirocin (BACTROBAN) 2 % ointment Apply topically 3 (three) times a day (Patient not taking: Reported on 3/6/2024)    valACYclovir (VALTREX) 1,000 mg tablet Take 2 tablets by mouth every 12 hours x 1 day    [DISCONTINUED] metroNIDAZOLE (FLAGYL) 500 mg tablet TAKE 1 TABLET BY MOUTH TWICE A DAY FOR 7 DAYS (Patient not taking: Reported on 3/6/2024)    [DISCONTINUED] metroNIDAZOLE (METROGEL) 0.75 % vaginal gel INSERT 1 APPLICATORFUL INTO THE VAGINA NIGHTLY FOR 5 DAYS. (Patient not taking: Reported on 3/6/2024)       Objective     /74 (BP Location: Left arm, Patient Position: Sitting)   Pulse 62   " Temp 97.8 °F (36.6 °C)   Ht 5' 9\" (1.753 m)   Wt 83.3 kg (183 lb 9.6 oz)   LMP 02/19/2024 (Approximate)   SpO2 100%   BMI 27.11 kg/m²     Physical Exam  Vitals reviewed.   Constitutional:       General: She is not in acute distress.     Appearance: Normal appearance. She is not ill-appearing.   HENT:      Head: Normocephalic and atraumatic.      Right Ear: External ear normal.      Left Ear: External ear normal.      Nose: Nose normal.      Mouth/Throat:      Mouth: Mucous membranes are moist.      Pharynx: Oropharynx is clear.   Eyes:      Conjunctiva/sclera: Conjunctivae normal.      Pupils: Pupils are equal, round, and reactive to light.   Cardiovascular:      Rate and Rhythm: Normal rate and regular rhythm.      Pulses: Normal pulses.      Heart sounds: Normal heart sounds. No murmur heard.  Pulmonary:      Effort: Pulmonary effort is normal.      Breath sounds: Normal breath sounds.   Abdominal:      General: Bowel sounds are normal.      Palpations: Abdomen is soft.      Tenderness: There is no abdominal tenderness. There is no right CVA tenderness or left CVA tenderness.   Genitourinary:     Labia:         Right: No rash or lesion.         Left: No rash or lesion.       Vagina: Vaginal discharge present. No bleeding or lesions.   Musculoskeletal:         General: Normal range of motion.      Cervical back: Normal range of motion and neck supple.   Skin:     General: Skin is warm and dry.   Neurological:      General: No focal deficit present.      Mental Status: She is alert and oriented to person, place, and time.   Psychiatric:         Mood and Affect: Mood normal.         Behavior: Behavior normal.       FANI Hall    "

## 2024-03-06 NOTE — PATIENT INSTRUCTIONS
Dysuria   AMBULATORY CARE:   Dysuria  is trouble urinating, or pain, burning, or discomfort when you urinate. Dysuria is usually a symptom of another problem, such as a blockage or urinary tract infection.  Common symptoms include the following:   Fever     Cloudy, bad smelling urine     Urge to urinate often but urinating little     Back, side, or abdominal pain     Blood in your urine     Discharge that smells bad     Itching    Seek care immediately if:   You have severe back, side, or abdominal pain.     You have fever and shaking chills.     You vomit several times in a row.    Contact your healthcare provider if:   Your symptoms do not go away, even after treatment.     You have questions or concerns about your condition or care.    Treatment for dysuria  may include medicines to treat a bacterial infection or help decrease bladder spasms.  Manage your dysuria:   Drink more liquids.  Liquids help flush out bacteria that may be causing an infection. Ask your healthcare provider how much liquid to drink each day and which liquids are best for you.     Take sitz baths as directed.  Fill a bathtub with 4 to 6 inches of warm water. You may also use a sitz bath pan that fits over a toilet. Sit in the sitz bath for 20 minutes. Do this 2 to 3 times a day, or as directed. The warm water can help decrease pain and swelling.     Follow up with your doctor as directed:  Write down your questions so you remember to ask them during your visits.  © Copyright [employer] 2023 Information is for End User's use only and may not be sold, redistributed or otherwise used for commercial purposes.  The above information is an  only. It is not intended as medical advice for individual conditions or treatments. Talk to your doctor, nurse or pharmacist before following any medical regimen to see if it is safe and effective for you.

## 2024-03-07 DIAGNOSIS — N76.0 BACTERIAL VAGINOSIS: Primary | ICD-10-CM

## 2024-03-07 DIAGNOSIS — B96.89 BACTERIAL VAGINOSIS: Primary | ICD-10-CM

## 2024-03-07 LAB
BACTERIA UR CULT: NORMAL
C GLABRATA DNA VAG QL NAA+PROBE: NEGATIVE
C KRUSEI DNA VAG QL NAA+PROBE: NEGATIVE
C TRACH DNA SPEC QL NAA+PROBE: NEGATIVE
CANDIDA SP 6 PNL VAG NAA+PROBE: NEGATIVE
N GONORRHOEA DNA SPEC QL NAA+PROBE: NEGATIVE
T VAGINALIS DNA VAG QL NAA+PROBE: NEGATIVE
VAGINOSIS/ITIS DNA PNL VAG PROBE+SIG AMP: POSITIVE

## 2024-03-07 RX ORDER — METRONIDAZOLE 500 MG/1
500 TABLET ORAL EVERY 12 HOURS SCHEDULED
Qty: 14 TABLET | Refills: 0 | Status: SHIPPED | OUTPATIENT
Start: 2024-03-07 | End: 2024-03-14

## 2024-03-15 ENCOUNTER — EVALUATION (OUTPATIENT)
Dept: PHYSICAL THERAPY | Facility: CLINIC | Age: 44
End: 2024-03-15
Payer: COMMERCIAL

## 2024-03-15 DIAGNOSIS — M75.42 SUBACROMIAL IMPINGEMENT OF LEFT SHOULDER: ICD-10-CM

## 2024-03-15 DIAGNOSIS — M67.912 DYSFUNCTION OF LEFT ROTATOR CUFF: ICD-10-CM

## 2024-03-15 PROCEDURE — 97110 THERAPEUTIC EXERCISES: CPT | Performed by: PHYSICAL THERAPIST

## 2024-03-15 PROCEDURE — 97161 PT EVAL LOW COMPLEX 20 MIN: CPT | Performed by: PHYSICAL THERAPIST
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
## 2024-03-20 ENCOUNTER — APPOINTMENT (OUTPATIENT)
Dept: PHYSICAL THERAPY | Facility: CLINIC | Age: 44
End: 2024-03-20
Payer: COMMERCIAL

## 2024-03-22 ENCOUNTER — OFFICE VISIT (OUTPATIENT)
Dept: PHYSICAL THERAPY | Facility: CLINIC | Age: 44
End: 2024-03-22
Payer: COMMERCIAL

## 2024-03-22 DIAGNOSIS — M67.912 DYSFUNCTION OF LEFT ROTATOR CUFF: ICD-10-CM

## 2024-03-22 DIAGNOSIS — M75.42 SUBACROMIAL IMPINGEMENT OF LEFT SHOULDER: Primary | ICD-10-CM

## 2024-03-22 PROCEDURE — 97140 MANUAL THERAPY 1/> REGIONS: CPT | Performed by: PHYSICAL THERAPIST

## 2024-03-22 PROCEDURE — 97112 NEUROMUSCULAR REEDUCATION: CPT | Performed by: PHYSICAL THERAPIST

## 2024-03-22 PROCEDURE — 97110 THERAPEUTIC EXERCISES: CPT | Performed by: PHYSICAL THERAPIST

## 2024-03-22 NOTE — PROGRESS NOTES
Daily Note     Today's date: 3/22/2024  Patient name: Kennedy Blake  : 1980  MRN: [id number]  Referring provider: Neftali Velasquez*  Dx:   Encounter Diagnosis     ICD-10-CM    1. Subacromial impingement of left shoulder  M75.42       2. Dysfunction of left rotator cuff  M67.912                      Subjective: Pt reports that her shoulder is improved. She reports following last session she had improvement for a few days.       Objective: See treatment diary below      Assessment: Tolerated treatment well. Improvement with manual treatment today as well as some improvement with TE. Poor scapular control with prone exercise. Improved following cueing. Updated HEP. Patient would benefit from continued PT      Plan: Continue per plan of care.  Progress treatment as tolerated.       Precautions: N/A  POC expires Unit limit Auth Expiration date PT/OT/ST + Visit Limit?   5/10/24 N/a pending BOMN                           Visit/Unit Tracking  AUTH Status:  Date 3/15              pending Used 1               Remaining                          Shld flex,            Manuals 3/15 3/22           Prone CTJ mobs Gd 4-5 BRENDA Gd 4-5 jt mob           Shld joint mobs                                       Neuro Re-Ed             Prone T  3x10  NV add weight          Prone Y  3x10 NV add weight                                                                           Ther Ex             Cervical SNAG 5x HEP            Three finger cervical rotation 5x HEP            Sidelying ER  3# 3x15           Scap push up             DB chest press                                       Pt edu BRENDA BRENDA           Ther Activity                                       Gait Training                                       Modalities

## 2024-03-27 ENCOUNTER — APPOINTMENT (OUTPATIENT)
Dept: PHYSICAL THERAPY | Facility: CLINIC | Age: 44
End: 2024-03-27
Payer: COMMERCIAL

## 2024-03-28 ENCOUNTER — OFFICE VISIT (OUTPATIENT)
Dept: PHYSICAL THERAPY | Facility: CLINIC | Age: 44
End: 2024-03-28
Payer: COMMERCIAL

## 2024-03-28 DIAGNOSIS — M75.42 SUBACROMIAL IMPINGEMENT OF LEFT SHOULDER: Primary | ICD-10-CM

## 2024-03-28 DIAGNOSIS — M67.912 DYSFUNCTION OF LEFT ROTATOR CUFF: ICD-10-CM

## 2024-03-28 PROCEDURE — 97110 THERAPEUTIC EXERCISES: CPT | Performed by: PHYSICAL THERAPIST

## 2024-03-28 PROCEDURE — 97140 MANUAL THERAPY 1/> REGIONS: CPT | Performed by: PHYSICAL THERAPIST

## 2024-03-28 NOTE — PROGRESS NOTES
Daily Note     Today's date: 3/28/2024  Patient name: Kennedy Blake  : 1980  MRN: [id number]  Referring provider: Neftali Velasquez*  Dx:   Encounter Diagnosis     ICD-10-CM    1. Subacromial impingement of left shoulder  M75.42       2. Dysfunction of left rotator cuff  M67.912                      Subjective: Pt reports her symptoms are improving. She has been completing HEP as prescribed.       Objective: See treatment diary below      Assessment: Tolerated treatment well. Asterisks improved compared to previous visit. Minimal pain with abd, no pain with flex, no pain in plank position but pain with 1/4 push-up. No change following MT. Following RTC and periscapular strengthening, significant improvement in asterisks. Minmal pain with abd. No pain on eccentric with 1/4 push-up, but pain on ascent. Updated HEP to include weights. Patient would benefit from continued PT      Plan: Continue per plan of care.  Progress treatment as tolerated.       Precautions: N/A  POC expires Unit limit Auth Expiration date PT/OT/ST + Visit Limit?   5/10/24 N/a pending BOMN                           Visit/Unit Tracking  AUTH Status:  Date 3/15              pending Used 1               Remaining                          Shld flex,            Manuals 3/15 3/22 3/28          Prone CTJ mobs Gd 4-5 BRENDA Gd 4-5 jt mob Gd 4-5 brenda mob          Shld joint mobs             Prone CTJ CPA/UPA   BRENDA gd 2-3                       Neuro Re-Ed             Prone T  3x10  2# 2x15          Prone Y  3x10 2# 2x15                                                                           Ther Ex             Cervical SNAG 5x HEP            Three finger cervical rotation 5x HEP            Sidelying ER  3# 3x15 3# 3x15          Scap push up    NV         DB chest press    NV (improves with graded exposure to push-up or OKC stability/strength                                   Pt edu BRENDA BRENDA BRENDA          Ther Activity                                        Gait Training                                       Modalities

## 2024-04-01 ENCOUNTER — OFFICE VISIT (OUTPATIENT)
Dept: PHYSICAL THERAPY | Facility: CLINIC | Age: 44
End: 2024-04-01
Payer: COMMERCIAL

## 2024-04-01 DIAGNOSIS — M75.42 SUBACROMIAL IMPINGEMENT OF LEFT SHOULDER: Primary | ICD-10-CM

## 2024-04-01 DIAGNOSIS — M67.912 DYSFUNCTION OF LEFT ROTATOR CUFF: ICD-10-CM

## 2024-04-01 PROCEDURE — 97112 NEUROMUSCULAR REEDUCATION: CPT | Performed by: PHYSICAL THERAPIST

## 2024-04-01 PROCEDURE — 97110 THERAPEUTIC EXERCISES: CPT | Performed by: PHYSICAL THERAPIST

## 2024-04-01 NOTE — PROGRESS NOTES
Daily Note     Today's date: 2024  Patient name: Kennedy Blake  : 1980  MRN: [id number]  Referring provider: Neftali Velasquez*  Dx:   Encounter Diagnosis     ICD-10-CM    1. Subacromial impingement of left shoulder  M75.42       2. Dysfunction of left rotator cuff  M67.912                      Subjective: Pt reports she has not been able to do HEP since last visit due to busy weekend.       Objective: See treatment diary below      Assessment: Tolerated treatment well. Tried to increase angle of shoulder for RTC strengthening today, asterisks worsened with this approach. Therefore, returned to previous exercise which significantly improved ROM asterisks as well as elevated push-up. Patient would benefit from continued PT      Plan: Continue per plan of care.  Progress treatment as tolerated.       Precautions: N/A  POC expires Unit limit Auth Expiration date PT/OT/ST + Visit Limit?   5/10/24 N/a pending BOMN                           Visit/Unit Tracking  AUTH Status:  Date 3/15              pending Used 1               Remaining                          Shld flex,            Manuals 3/15 3/22 3/28 4/1         Prone CTJ mobs Gd 4-5 BRENDA Gd 4-5 jt mob Gd 4-5 brenda mob          Shld joint mobs             Prone CTJ CPA/UPA   BRENDA gd 2-3                       Neuro Re-Ed             Prone T  3x10  2# 2x15 3# 2x15         Prone Y  3x10 2# 2x15 3# 2x15         Standing shld flexion with band    BTB 1x10                                                             Ther Ex             Cervical SNAG 5x HEP            Three finger cervical rotation 5x HEP            Sidelying ER  3# 3x15 3# 3x15 3# 3x15         Scap push up             DB chest press             Seated ER on knee    2x15 2#                      Pt edu BRENDA BRENDA BRENDA          Ther Activity                                       Gait Training                                       Modalities

## 2024-04-05 ENCOUNTER — OFFICE VISIT (OUTPATIENT)
Dept: PHYSICAL THERAPY | Facility: CLINIC | Age: 44
End: 2024-04-05
Payer: COMMERCIAL

## 2024-04-05 DIAGNOSIS — M75.42 SUBACROMIAL IMPINGEMENT OF LEFT SHOULDER: Primary | ICD-10-CM

## 2024-04-05 DIAGNOSIS — M67.912 DYSFUNCTION OF LEFT ROTATOR CUFF: ICD-10-CM

## 2024-04-05 PROCEDURE — 97112 NEUROMUSCULAR REEDUCATION: CPT | Performed by: PHYSICAL THERAPIST

## 2024-04-05 PROCEDURE — 97110 THERAPEUTIC EXERCISES: CPT | Performed by: PHYSICAL THERAPIST

## 2024-04-05 NOTE — PROGRESS NOTES
Daily Note     Today's date: 2024  Patient name: Kennedy Blake  : 1980  MRN: [id number]  Referring provider: Neftali Velasquez*  Dx:   Encounter Diagnosis     ICD-10-CM    1. Subacromial impingement of left shoulder  M75.42       2. Dysfunction of left rotator cuff  M67.912                      Subjective: Pt reports that her shoulder is feeling good with exercise. She still has pain      Objective: See treatment diary below      Assessment: UBE for muscular endurance. Tolerated treatment well. Asterisks improving. Minimal pain with Wbing push-ups on plinth. Some pain when starting with Prone T, therefore, moved to sidelying ER and then returned, less pain when returned. Added serratus strengthening with good response from patient. No pain with asterisks at end of treatment. Patient would benefit from continued PT      Plan: Continue per plan of care.  Progress treatment as tolerated.       Precautions: N/A  POC expires Unit limit Auth Expiration date PT/OT/ST + Visit Limit?   5/10/24 N/a pending BOMN                           Visit/Unit Tracking  AUTH Status:  Date 3/15              pending Used 1               Remaining                          Shld flex,            Manuals 3/15 3/22 3/28 4/1 4        Prone CTJ mobs Gd 4-5 BRENDA Gd 4-5 jt mob Gd 4-5 brenda mob          Shld joint mobs             Prone CTJ CPA/UPA   BRENDA gd 2-3                       Neuro Re-Ed             Prone T  3x10  2# 2x15 3# 2x15 3# 2x15        Prone Y  3x10 2# 2x15 3# 2x15         Standing shld flexion with band    BTB 1x10         Supine scap protraction     1x15 10#; 1x15 15#                                               Ther Ex             Cervical SNAG 5x HEP            Three finger cervical rotation 5x HEP            Sidelying ER  3# 3x15 3# 3x15 3# 3x15 3# 2x15, 4# 1x15        Scap push up             DB chest press             Seated ER on knee    2x15 2#         UBE     3'/3'        Pt edu BRENDA BRENDA BRENDA          Ther  Activity                                       Gait Training                                       Modalities

## 2024-04-10 ENCOUNTER — OFFICE VISIT (OUTPATIENT)
Dept: PHYSICAL THERAPY | Facility: CLINIC | Age: 44
End: 2024-04-10
Payer: COMMERCIAL

## 2024-04-10 DIAGNOSIS — M75.42 SUBACROMIAL IMPINGEMENT OF LEFT SHOULDER: Primary | ICD-10-CM

## 2024-04-10 DIAGNOSIS — M67.912 DYSFUNCTION OF LEFT ROTATOR CUFF: ICD-10-CM

## 2024-04-10 PROCEDURE — 97112 NEUROMUSCULAR REEDUCATION: CPT | Performed by: PHYSICAL THERAPIST

## 2024-04-10 PROCEDURE — 97110 THERAPEUTIC EXERCISES: CPT | Performed by: PHYSICAL THERAPIST
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
## 2024-04-12 ENCOUNTER — OFFICE VISIT (OUTPATIENT)
Dept: PHYSICAL THERAPY | Facility: CLINIC | Age: 44
End: 2024-04-12
Payer: COMMERCIAL

## 2024-04-12 DIAGNOSIS — M67.912 DYSFUNCTION OF LEFT ROTATOR CUFF: ICD-10-CM

## 2024-04-12 DIAGNOSIS — M75.42 SUBACROMIAL IMPINGEMENT OF LEFT SHOULDER: Primary | ICD-10-CM

## 2024-04-12 PROCEDURE — 97112 NEUROMUSCULAR REEDUCATION: CPT | Performed by: PHYSICAL THERAPIST

## 2024-04-12 PROCEDURE — 97110 THERAPEUTIC EXERCISES: CPT | Performed by: PHYSICAL THERAPIST

## 2024-04-12 NOTE — PROGRESS NOTES
"Daily Note     Today's date: 2024  Patient name: Kennedy Blake  : 1980  MRN: [id number]  Referring provider: Neftali Velasquez*  Dx:   Encounter Diagnosis     ICD-10-CM    1. Subacromial impingement of left shoulder  M75.42       2. Dysfunction of left rotator cuff  M67.912                      Subjective: Pt has minimal complaints of pain at the initiation of treatment today. Pt notes that weight bearing activities such as push ups are the most challenging activity for her right now.       Objective: See treatment diary below      Assessment: Tolerated treatment well. Patient demonstrated fatigue post treatment and would benefit from continued PT. Assessed patient push up form with assisted table height. Good form noted, but pain and weakness during concentric portion. Progressed UE weight bearing activities with patient denying pain reproduction, but noting fatigue post.       Plan: Continue per plan of care.  Progress treatment as tolerated.       Precautions: N/A  POC expires Unit limit Auth Expiration date PT/OT/ST + Visit Limit?   5/10/24 N/a pending BOMN                           Visit/Unit Tracking  AUTH Status:  Date 3/15              pending Used 1               Remaining                          Shld flex,            Manuals 3/15 3/22 3/28 4/1 4/4 4/12       Prone CTJ mobs Gd 4-5 BRENDA Gd 4-5 jt mob Gd 4-5 brenda mob          Shld joint mobs             Prone CTJ CPA/UPA   BRENDA gd 2-3                       Neuro Re-Ed             Prone T  3x10  2# 2x15 3# 2x15 3# 2x15 3# 2x15       Prone Y  3x10 2# 2x15 3# 2x15  3# 2x15       Standing shld flexion with band    BTB 1x10         Supine scap protraction     1x15 10#; 1x15 15# 1x15 10#; 1x15 15#       Quadruped weight shift      10\"x0       Quadruped UE lift (R only)      10x                    Ther Ex             Cervical SNAG 5x HEP            Three finger cervical rotation 5x HEP            Sidelying ER  3# 3x15 3# 3x15 3# 3x15 3# 2x15, 4# " 1x15 3# 3x15       Scap push up             DB chest press             Seated ER on knee    2x15 2#         UBE     3'/3' 3'/3'       Pt edu BRENDA BRENDA BRENDA          Ther Activity                                       Gait Training                                       Modalities

## 2024-04-15 ENCOUNTER — TELEPHONE (OUTPATIENT)
Dept: FAMILY MEDICINE CLINIC | Facility: CLINIC | Age: 44
End: 2024-04-15

## 2024-04-15 NOTE — TELEPHONE ENCOUNTER
Patient pulled a tick off of her yesterday - bite location: back. She did save the tick and is going to have it tested by [facility]. She wanted to know, which of their options she should choose. She can do either:    Free: Test for Top 4 Pathogens Associated with Tick Bourne Illness    $50: Test for Top 6 Pathogens Associated with Tick Bourne Illness    $100: Test for ALL Pathogens Associated with Tick Bourne Illness    She wants advice as to which would be best for her. Please advise, thank you!

## 2024-04-15 NOTE — TELEPHONE ENCOUNTER
Called patient no answer left detailed message , it should be the Free: test top 4 pathogens option.

## 2024-04-17 ENCOUNTER — OFFICE VISIT (OUTPATIENT)
Age: 44
End: 2024-04-17
Payer: COMMERCIAL

## 2024-04-17 ENCOUNTER — APPOINTMENT (OUTPATIENT)
Dept: PHYSICAL THERAPY | Facility: CLINIC | Age: 44
End: 2024-04-17
Payer: COMMERCIAL

## 2024-04-17 VITALS
WEIGHT: 189 LBS | OXYGEN SATURATION: 99 % | HEART RATE: 57 BPM | RESPIRATION RATE: 18 BRPM | DIASTOLIC BLOOD PRESSURE: 56 MMHG | SYSTOLIC BLOOD PRESSURE: 105 MMHG | HEIGHT: 69 IN | BODY MASS INDEX: 27.99 KG/M2 | TEMPERATURE: 97.7 F

## 2024-04-17 DIAGNOSIS — S30.860A TICK BITE OF LOWER BACK, INITIAL ENCOUNTER: Primary | ICD-10-CM
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
## 2024-04-17 PROCEDURE — G0382 LEV 3 HOSP TYPE B ED VISIT: HCPCS | Performed by: PHYSICIAN ASSISTANT

## 2024-04-17 PROCEDURE — 99283 EMERGENCY DEPT VISIT LOW MDM: CPT | Performed by: PHYSICIAN ASSISTANT

## 2024-04-17 RX ORDER — DOXYCYCLINE 100 MG/1
TABLET ORAL
Qty: 2 TABLET | Refills: 0 | Status: SHIPPED | OUTPATIENT
Start: 2024-04-17 | End: 2024-04-17

## 2024-04-17 NOTE — PROGRESS NOTES
[facility]        NAME: Kennedy Blake is a 43 y.o. female  : 1980    MRN: [id number]  DATE: 2024  TIME: 5:23 PM    Assessment and Plan   Tick bite of lower back, initial encounter [S30.860A, W57.XXXA]  1. Tick bite of lower back, initial encounter  doxycycline (ADOXA) 100 MG tablet            Patient Instructions     Criteria for full doxycycline regimen is not met in light of duration of feeding, unknown female or male tech, unknown if Lyme disease is present.    However you are a candidate for the prophylaxis of doxycycline 100 mg 2 capsules to take now.    Follow-up with your primary care provider    Proceed to  ER if symptoms worsen.        Chief Complaint     Chief Complaint   Patient presents with    Tick Bite     Started 3 days ago, patient complains of tick bite of lower back.          History of Present Illness       43-year-old female is presenting with complaint of a tick bite to her lower back discovered earlier today.  Patient feels that has been feeding approximately 2 hours.  She has taken the tick to [facility] to have it assessed for Lyme disease.        Review of Systems   Review of Systems   Constitutional:  Negative for fatigue and fever.   Musculoskeletal:  Negative for arthralgias and myalgias.   Neurological:  Negative for headaches.         Current Medications       Current Outpatient Medications:     albuterol (Ventolin HFA) 90 mcg/act inhaler, Inhale 2 puffs every 6 (six) hours as needed for wheezing, Disp: 8 g, Rfl: 1    Azelastine HCl 137 MCG/SPRAY SOLN, , Disp: , Rfl:     ciclopirox (PENLAC) 8 % solution, APPLY TO NAILS DAILY - REMOVE ON 7TH DAY WITH POLISH REMOVER, Disp: , Rfl:     doxycycline (ADOXA) 100 MG tablet, Take two tabs by mouth at once x one day, Disp: 2 tablet, Rfl: 0    ferrous sulfate 324 (65 Fe) mg, TAKE 1 TABLET BY MOUTH 2 TIMES A DAY BEFORE MEALS., Disp: 180 tablet, Rfl: 1    hydrocortisone (ANUSOL-HC) 2.5 % rectal cream, Apply topically 2  "(two) times a day, Disp: 28 g, Rfl: 1    sertraline (ZOLOFT) 25 mg tablet, Take 1 tablet (25 mg total) by mouth daily at bedtime, Disp: 90 tablet, Rfl: 3    sertraline (ZOLOFT) 50 mg tablet, Take 1 tablet (50 mg total) by mouth daily, Disp: 90 tablet, Rfl: 2    metoprolol tartrate (LOPRESSOR) 100 mg tablet, Take 1 tablet (100 mg total) by mouth once for 1 dose (Patient not taking: Reported on 3/6/2024), Disp: 1 tablet, Rfl: 0    mupirocin (BACTROBAN) 2 % ointment, Apply topically 3 (three) times a day (Patient not taking: Reported on 3/6/2024), Disp: 60 g, Rfl: 1    promethazine-dextromethorphan (PHENERGAN-DM) 6.25-15 mg/5 mL oral syrup, Take 5 mL by mouth 4 (four) times a day as needed for cough (Patient not taking: Reported on 4/17/2024), Disp: 118 mL, Rfl: 0    valACYclovir (VALTREX) 1,000 mg tablet, Take 2 tablets by mouth every 12 hours x 1 day, Disp: 10 tablet, Rfl: 1    Current Allergies     Allergies as of 04/17/2024    (No Known Allergies)            The following portions of the patient's history were reviewed and updated as appropriate: allergies, current medications, past family history, past medical history, past social history, past surgical history and problem list.     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        Objective   /56   Pulse 57   Temp 97.7 °F (36.5 °C)   Resp 18   Ht 5' 9\" (1.753 m)   Wt 85.7 kg (189 lb)   SpO2 99%   BMI 27.91 kg/m²        Physical Exam     Physical Exam  Vitals and nursing note reviewed.   Constitutional:       General: She is not in acute " distress.     Appearance: Normal appearance. She is obese. She is not ill-appearing, toxic-appearing or diaphoretic.   Eyes:      General: No scleral icterus.     Extraocular Movements: Extraocular movements intact.      Conjunctiva/sclera: Conjunctivae normal.      Pupils: Pupils are equal, round, and reactive to light.   Cardiovascular:      Rate and Rhythm: Normal rate and regular rhythm.      Pulses: Normal pulses.   Pulmonary:      Effort: Pulmonary effort is normal. No respiratory distress.   Musculoskeletal:         General: Normal range of motion.      Cervical back: Normal range of motion and neck supple.   Skin:     Comments: Single 2 mm erythematous papule lesion noted on the lower back along the belt line.   Neurological:      Mental Status: She is alert and oriented to person, place, and time.      Coordination: Coordination normal.      Gait: Gait normal.   Psychiatric:         Mood and Affect: Mood normal.         Behavior: Behavior normal.

## 2024-04-17 NOTE — PATIENT INSTRUCTIONS
Tick Bite   WHAT YOU NEED TO KNOW:   Ticks need to be removed quickly. Most tick bites are not dangerous, but ticks can pass disease or infection when they bite. Diseases include Lyme disease, babesiosis, tularemia, and [place] Spotted Fever. Signs and symptoms may develop weeks or even months after a bite. Some may happen right away, such as redness, pain, itching, and swelling near the bite area. Watch for a fever, rash, body aches, or breathing problems. These may be symptoms of a serious disease that needs to be treated quickly.  DISCHARGE INSTRUCTIONS:   Return to the emergency department if:   You have trouble walking or moving your legs.    You have joint pain, muscle pain, or muscle weakness within 1 month of a tick bite.    You have a fever, chills, headache, or rash.    Call your doctor if:   You cannot remove the tick.    The tick's head is stuck in your skin.    You have questions or concerns about your condition or care.    Medicines:   Medicines  help decrease pain, redness, itching, and swelling. You may also need medicine to prevent or fight a bacterial infection. These medicines may be given as a cream, lotion, or pill.    Take your medicine as directed.  Contact your healthcare provider if you think your medicine is not helping or if you have side effects. Tell your provider if you are allergic to any medicine. Keep a list of the medicines, vitamins, and herbs you take. Include the amounts, and when and why you take them. Bring the list or the pill bottles to follow-up visits. Carry your medicine list with you in case of an emergency.    How to remove a tick:  Remove the tick as soon as possible to help prevent disease or infection. You are less likely to get sick from a tick bite if you remove the tick within 24 hours. Do not use petroleum jelly, nail polish, rubbing alcohol, or heat. These do not work and may be dangerous. Do the following to remove a tick:  First, try a soapy cotton ball.  Soak a cotton ball in liquid soap. Cover the tick with the cotton ball for 30 seconds. The tick may come off with the cotton ball when you pull it away.    Use tweezers if the soapy cotton ball does not work. Grasp the tick as close to your skin as possible. Pull the tick straight up and out. Do not touch the tick with your bare hands.    Do not twist or jerk the tick suddenly, because this may break off the tick's head or mouth parts. Do not leave any part of the tick in your skin.    Do not crush or squeeze the tick since its body may be infected with germs. Flush the tick down the toilet.    After the tick is removed, clean the area of the bite with rubbing alcohol. Then wash your hands with soap and water.       Apply ice  on your bite for 15 to 20 minutes every hour or as directed. Use an ice pack, or put crushed ice in a plastic bag. Cover it with a towel before you apply it to your skin. Ice helps prevent tissue damage and decreases swelling and pain.  Prevent a tick bite:  Ticks live in areas covered by brush and grass. They may even be found in your lawn if you live in certain areas. Outdoor pets can carry ticks inside the house. Ticks can grab onto you or your clothes when you walk by grass or brush. If you go into areas that contain many trees, tall grasses, and underbrush, do the following:  Wear light colored pants and a long-sleeved shirt.  Tuck your pants into your socks or boots. Tuck in your shirt. Wear sleeves that fit close to the skin at your wrists and neck. This will help prevent ticks from crawling through gaps in your clothing and onto your skin. Wear a hat in areas with trees.    Apply insect repellant on your skin.  The insect repellant should contain DEET. Do not put insect repellant on skin that is cut, scratched, or irritated. Always use soap and water to wash the insect repellant off as soon as possible once you are indoors. Do not apply insect repellant on your child's face or  hands.    Spray insect repellant onto your clothes.  Use permethrin spray. This spray kills ticks that crawl on your clothing. Be sure to spray the tops of your boots, bottom of pant legs, and sleeve cuffs. As soon as possible, wash and dry clothing in hot water and high heat.    Check your clothing, hair, and skin for ticks.  Shower within 2 hours of coming indoors. Carefully check the hairline, armpits, neck, and waist. Check your pets and children for ticks. Remove ticks from pets the same way as you remove them from people.    Decrease the risk for ticks in your yard.  Ticks like to live in shady, moist areas. Mow your lawn regularly to keep the grass short. Trim the grass around birdbaths and fences. Cut branches that are overgrown and take them out of the yard. Clear out leaf piles. Stack firewood in a dry, jose armando area.    Follow up with your doctor as directed:  Write down your questions so you remember to ask them during your visits.  © Copyright [employer] 2023 Information is for End User's use only and may not be sold, redistributed or otherwise used for commercial purposes.  The above information is an  only. It is not intended as medical advice for individual conditions or treatments. Talk to your doctor, nurse or pharmacist before following any medical regimen to see if it is safe and effective for you.

## 2024-04-18 ENCOUNTER — TELEPHONE (OUTPATIENT)
Dept: FAMILY MEDICINE CLINIC | Facility: CLINIC | Age: 44
End: 2024-04-18
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
## 2024-04-18 NOTE — TELEPHONE ENCOUNTER
I don't see where she was checked for Lyme's it looks like the tick was she was seen in the urgent care however

## 2024-04-18 NOTE — TELEPHONE ENCOUNTER
Patient was seen at  4/17 for tick bite - tested positive for Lyme Disease -  gave her only 2 pills of Doxy.  She wants to know if she needs to make an appt to see you (or another provider) to get a longer dose of Doxy or can you just prescribe this for her?     Patient will be on the [facility] trail today and won't have much cell service - please leave a detailed message on her voicemail.

## 2024-04-19 ENCOUNTER — APPOINTMENT (OUTPATIENT)
Dept: PHYSICAL THERAPY | Facility: CLINIC | Age: 44
End: 2024-04-19
Payer: COMMERCIAL

## 2024-04-24 ENCOUNTER — OFFICE VISIT (OUTPATIENT)
Dept: PHYSICAL THERAPY | Facility: CLINIC | Age: 44
End: 2024-04-24
Payer: COMMERCIAL

## 2024-04-24 DIAGNOSIS — M67.912 DYSFUNCTION OF LEFT ROTATOR CUFF: ICD-10-CM

## 2024-04-24 DIAGNOSIS — M75.42 SUBACROMIAL IMPINGEMENT OF LEFT SHOULDER: Primary | ICD-10-CM

## 2024-04-24 PROCEDURE — 97112 NEUROMUSCULAR REEDUCATION: CPT

## 2024-04-24 PROCEDURE — 97110 THERAPEUTIC EXERCISES: CPT

## 2024-04-24 NOTE — PROGRESS NOTES
"Daily Note     Today's date: 2024  Patient name: Kennedy Blake  : 1980  MRN: [id number]  Referring provider: Neftali Velasquez*  Dx:   Encounter Diagnosis     ICD-10-CM    1. Subacromial impingement of left shoulder  M75.42       2. Dysfunction of left rotator cuff  M67.912                      Subjective: Patient reports partial compliance with HEP. States she feels the pain is less intense when she is in a push up position.       Objective: See treatment diary below      Assessment: Cont with POC with good tolerance. Added rows and ext for postural strengthening. She had difficulty with scapular rhythm on L when performing shoulder ext. Added on table with scap retraction for isolation with good effort and form noted. Patient demonstrated fatigue post treatment and would benefit from continued PT      Plan: Progress treatment as tolerated.       Precautions: N/A  POC expires Unit limit Auth Expiration date PT/OT/ST + Visit Limit?   5/10/24 N/a pending BOMN                           Visit/Unit Tracking  AUTH Status:  Date 3/15              pending Used 1               Remaining                          Shld flex,            Manuals 3/15 3/22 3/28 4/1 4/4 4/12 4/24      Prone CTJ mobs Gd 4-5 BRENDA Gd 4-5 jt mob Gd 4-5 brenda mob          Shld joint mobs             Prone CTJ CPA/UPA   BRENDA gd 2-3                       Neuro Re-Ed             Prone T  3x10  2# 2x15 3# 2x15 3# 2x15 3# 2x15 3# 2x15       Prone Y  3x10 2# 2x15 3# 2x15  3# 2x15 3# 2x15      Prone I        Scap retract then ext 2x10   4#      Standing shld flexion with band    BTB 1x10         Supine scap protraction     1x15 10#; 1x15 15# 1x15 10#; 1x15 15# 1x15 10#  1x15  15#       Quadruped weight shift      10\"x0 declined      Quadruped UE lift (R only)      10x Declined                    Ther Ex             Cervical SNAG 5x HEP            Three finger cervical rotation 5x HEP            Sidelying ER  3# 3x15 3# 3x15 3# 3x15 3# 2x15, " 4# 1x15 3# 3x15 3# 2x15  4#   1x15       Rows       2x10 GTB      Shoulder Ext       2x10 RTB      Scap push up             DB chest press             Seated ER on knee    2x15 2#         UBE     3'/3' 3'/3' 3'/3'      Pt edu BRENDA BRENDA BRENDA          Ther Activity                                       Gait Training                                       Modalities

## 2024-04-26 ENCOUNTER — OFFICE VISIT (OUTPATIENT)
Dept: PHYSICAL THERAPY | Facility: CLINIC | Age: 44
End: 2024-04-26
Payer: COMMERCIAL

## 2024-04-26 DIAGNOSIS — M75.42 SUBACROMIAL IMPINGEMENT OF LEFT SHOULDER: Primary | ICD-10-CM

## 2024-04-26 DIAGNOSIS — M67.912 DYSFUNCTION OF LEFT ROTATOR CUFF: ICD-10-CM

## 2024-04-26 PROCEDURE — 97140 MANUAL THERAPY 1/> REGIONS: CPT | Performed by: PHYSICAL THERAPIST

## 2024-04-26 PROCEDURE — 97112 NEUROMUSCULAR REEDUCATION: CPT | Performed by: PHYSICAL THERAPIST

## 2024-04-26 NOTE — PROGRESS NOTES
"Daily Note     Today's date: 2024  Patient name: Kennedy Blake  : 1980  MRN: [id number]  Referring provider: Neftali Velasquez*  Dx:   Encounter Diagnosis     ICD-10-CM    1. Subacromial impingement of left shoulder  M75.42       2. Dysfunction of left rotator cuff  M67.912                      Subjective: Pt reports her R forearm has been really bothering her. Her shoulder is improving.       Objective: See treatment diary below      Assessment: Tolerated treatment well. (+) middle finger ext, mills stretch, and cozen's on the R. Limited R wrist ext compared to other. Also, seemingly (+) radial neurodynamic test. Following passive nerve tensioner, improvement all LET tests. Therefore, nerve slider added for home. Shoulder strength improving. Only limited in Wbing due to R wrist pain. Patient would benefit from continued PT      Plan: Continue per plan of care.  Progress treatment as tolerated.       Precautions: N/A  [id number]    POC expires Unit limit Auth Expiration date PT/OT/ST + Visit Limit?   5/10/24 N/a pending BOMN                           Visit/Unit Tracking  AUTH Status:  Date 3/15              pending Used 1               Remaining                          Shld flex,            Manuals 3/15 3/22 3/28 4/1 4/4 4/12 4/24 4/26     Prone CTJ mobs Gd 4-5 BRENDA Gd 4-5 jt mob Gd 4-5 brenda mob          Shld joint mobs             Prone CTJ CPA/UPA   BRENDA gd 2-3          Manual passive radial nerve tensioner        BRENDA     Neuro Re-Ed             Prone T  3x10  2# 2x15 3# 2x15 3# 2x15 3# 2x15 3# 2x15  4# 2x15     Prone Y  3x10 2# 2x15 3# 2x15  3# 2x15 3# 2x15 3# 2x15     Prone I        Scap retract then ext 2x10   4#      Standing shld flexion with band    BTB 1x10         Supine scap protraction     1x15 10#; 1x15 15# 1x15 10#; 1x15 15# 1x15 10#  1x15  15#  2x15 15#     Quadruped weight shift      10\"x0 declined 2x10     Quadruped UE lift (R only)      10x Declined  2x10 on box full plank           "        Ther Ex             Cervical SNAG 5x HEP            Three finger cervical rotation 5x HEP            Sidelying ER  3# 3x15 3# 3x15 3# 3x15 3# 2x15, 4# 1x15 3# 3x15 3# 2x15  4#   1x15  4# 2x15     Rows       2x10 GTB      Shoulder Ext       2x10 RTB      Scap push up             DB chest press             Seated ER on knee    2x15 2#         UBE     3'/3' 3'/3' 3'/3'      Pt edu BRENDA BRENDA BRENDA          Ther Activity                                       Gait Training                                       Modalities

## 2024-04-29 ENCOUNTER — TELEPHONE (OUTPATIENT)
Dept: PSYCHIATRY | Facility: CLINIC | Age: 44
End: 2024-04-29

## 2024-04-29 NOTE — TELEPHONE ENCOUNTER
Returning patient call. Spoke w. Patient whom stated they are interested in services. Patient stated they are not interested in [facility] and would rather [facility]. Patient asked if appt can be virtual which writer notified patient due to insurance they would have to all be in-person. Patient agreed to remain on list until something becomes available at [facility]

## 2024-04-29 NOTE — TELEPHONE ENCOUNTER
Contacted patient off of NON-REFERRAL to verify needs of services in attempts to offer patient an appointment at [facility] . LVM for patient to contact intake dept  in regards to wait list.

## 2024-04-29 NOTE — TELEPHONE ENCOUNTER
Kennedy Blake called the office and left a voicemail and  requested a call back to discuss scheduling..    They can be reached at P# [phone].       Thank you.

## 2024-05-08 ENCOUNTER — EVALUATION (OUTPATIENT)
Dept: PHYSICAL THERAPY | Facility: CLINIC | Age: 44
End: 2024-05-08
Payer: COMMERCIAL

## 2024-05-08 DIAGNOSIS — M67.912 DYSFUNCTION OF LEFT ROTATOR CUFF: ICD-10-CM

## 2024-05-08 DIAGNOSIS — M75.42 SUBACROMIAL IMPINGEMENT OF LEFT SHOULDER: Primary | ICD-10-CM

## 2024-05-08 PROCEDURE — 97140 MANUAL THERAPY 1/> REGIONS: CPT | Performed by: PHYSICAL THERAPIST

## 2024-05-08 PROCEDURE — 97110 THERAPEUTIC EXERCISES: CPT | Performed by: PHYSICAL THERAPIST

## 2024-05-08 PROCEDURE — 97112 NEUROMUSCULAR REEDUCATION: CPT | Performed by: PHYSICAL THERAPIST
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
## 2024-05-15 ENCOUNTER — OFFICE VISIT (OUTPATIENT)
Dept: FAMILY MEDICINE CLINIC | Facility: CLINIC | Age: 44
End: 2024-05-15
Payer: COMMERCIAL

## 2024-05-15 ENCOUNTER — OFFICE VISIT (OUTPATIENT)
Dept: PHYSICAL THERAPY | Facility: CLINIC | Age: 44
End: 2024-05-15
Payer: COMMERCIAL

## 2024-05-15 VITALS
OXYGEN SATURATION: 98 % | BODY MASS INDEX: 26.81 KG/M2 | SYSTOLIC BLOOD PRESSURE: 110 MMHG | HEART RATE: 70 BPM | DIASTOLIC BLOOD PRESSURE: 68 MMHG | TEMPERATURE: 98.2 F | WEIGHT: 181 LBS | HEIGHT: 69 IN | RESPIRATION RATE: 18 BRPM

## 2024-05-15 DIAGNOSIS — L73.1 INGROWN HAIR: Primary | ICD-10-CM

## 2024-05-15 DIAGNOSIS — M67.912 DYSFUNCTION OF LEFT ROTATOR CUFF: ICD-10-CM

## 2024-05-15 DIAGNOSIS — M75.42 SUBACROMIAL IMPINGEMENT OF LEFT SHOULDER: Primary | ICD-10-CM

## 2024-05-15 PROCEDURE — 97110 THERAPEUTIC EXERCISES: CPT | Performed by: PHYSICAL THERAPIST

## 2024-05-15 PROCEDURE — 97140 MANUAL THERAPY 1/> REGIONS: CPT | Performed by: PHYSICAL THERAPIST

## 2024-05-15 PROCEDURE — 97112 NEUROMUSCULAR REEDUCATION: CPT | Performed by: PHYSICAL THERAPIST

## 2024-05-15 PROCEDURE — 99213 OFFICE O/P EST LOW 20 MIN: CPT | Performed by: NURSE PRACTITIONER

## 2024-05-15 NOTE — PROGRESS NOTES
Ambulatory Visit  Name: Kennedy Blake      : 1980      MRN: [id number]  Encounter Provider: FANI Hall  Encounter Date: 5/15/2024   Encounter department: [facility] [address]    Assessment & Plan   1. Ingrown hair  Comments:  Area cleaned, no ingronw hair noted. Advised avoiding plucking, warm/moist compress to face, can use benzoyl peroxide 3 times/week.       History of Present Illness     Presents office for evaluation of ingrown hair on the face.  Symptoms began a week ago.  Per patient, has been.  Observed single hair was ingrown.    Attempted multiple times at removing ingrown hair on her own.  She notes local redness and irritation.  Denies fever, chills, drainage, pustules.        Review of Systems   Constitutional:  Negative for chills and fever.   HENT:  Negative for congestion.    Respiratory:  Negative for cough.    Cardiovascular:  Negative for chest pain and palpitations.   Gastrointestinal:  Negative for nausea and vomiting.   Musculoskeletal:  Negative for neck pain.   Skin:  Negative for color change and rash.   Neurological:  Negative for dizziness and headaches.   Hematological:  Negative for adenopathy.   Psychiatric/Behavioral:  Negative for confusion.      Pertinent Medical History     Current Outpatient Medications on File Prior to Visit   Medication Sig Dispense Refill    albuterol (Ventolin HFA) 90 mcg/act inhaler Inhale 2 puffs every 6 (six) hours as needed for wheezing 8 g 1    Azelastine HCl 137 MCG/SPRAY SOLN       ciclopirox (PENLAC) 8 % solution APPLY TO NAILS DAILY - REMOVE ON 7TH DAY WITH POLISH REMOVER      ferrous sulfate 324 (65 Fe) mg TAKE 1 TABLET BY MOUTH 2 TIMES A DAY BEFORE MEALS. 180 tablet 1    hydrocortisone (ANUSOL-HC) 2.5 % rectal cream Apply topically 2 (two) times a day 28 g 1    mupirocin (BACTROBAN) 2 % ointment Apply topically 3 (three) times a day 60 g 1    sertraline (ZOLOFT) 25 mg tablet Take 1 tablet  "(25 mg total) by mouth daily at bedtime 90 tablet 3    sertraline (ZOLOFT) 50 mg tablet Take 1 tablet (50 mg total) by mouth daily 90 tablet 2    valACYclovir (VALTREX) 1,000 mg tablet Take 2 tablets by mouth every 12 hours x 1 day 10 tablet 1    [DISCONTINUED] metoprolol tartrate (LOPRESSOR) 100 mg tablet Take 1 tablet (100 mg total) by mouth once for 1 dose (Patient not taking: Reported on 3/6/2024) 1 tablet 0    [DISCONTINUED] promethazine-dextromethorphan (PHENERGAN-DM) 6.25-15 mg/5 mL oral syrup Take 5 mL by mouth 4 (four) times a day as needed for cough (Patient not taking: Reported on 4/17/2024) 118 mL 0     No current facility-administered medications on file prior to visit.      Objective     /68 (BP Location: Left arm, Patient Position: Sitting)   Pulse 70   Temp 98.2 °F (36.8 °C)   Resp 18   Ht 5' 9\" (1.753 m)   Wt 82.1 kg (181 lb)   LMP 05/04/2024 (Exact Date)   SpO2 98%   BMI 26.73 kg/m²     Physical Exam  Constitutional:       General: She is not in acute distress.     Appearance: Normal appearance. She is not ill-appearing.   HENT:      Head:        Right Ear: External ear normal.      Left Ear: External ear normal.      Nose: Nose normal.      Mouth/Throat:      Mouth: Mucous membranes are moist.      Pharynx: Oropharynx is clear.   Cardiovascular:      Rate and Rhythm: Normal rate and regular rhythm.      Pulses: Normal pulses.      Heart sounds: Normal heart sounds.   Pulmonary:      Effort: Pulmonary effort is normal.      Breath sounds: Normal breath sounds.   Musculoskeletal:         General: Normal range of motion.      Cervical back: Normal range of motion and neck supple.   Skin:     General: Skin is warm and dry.   Neurological:      General: No focal deficit present.      Mental Status: She is alert and oriented to person, place, and time.   Psychiatric:         Mood and Affect: Mood normal.         Behavior: Behavior normal.       I have spent a total time of 10 minutes on " 05/15/24 In caring for this patient including Patient and family education, Impressions, Counseling / Coordination of care, Documenting in the medical record, and Obtaining or reviewing history  .

## 2024-05-15 NOTE — PROGRESS NOTES
Daily Note     Today's date: 5/15/2024  Patient name: Kennedy Blake  : 1980  MRN: [id number]  Referring provider: Neftali Velasquez*  Dx:   Encounter Diagnosis     ICD-10-CM    1. Subacromial impingement of left shoulder  M75.42       2. Dysfunction of left rotator cuff  M67.912                      Subjective: Pt reports that she is able to complete downward dog without pain. No pain throughout her daily life.       Objective: See treatment diary below      Assessment: Tolerated treatment well. Improved control in NWBing of scap retraction/post GH glide. Followed up with MRE with good control. Chest press continues to require cueing for scap retraction without symptoms, increased to 5# db's. Significant challenge with Wbing scap protraction/retraction. Following cueing, improved but significant fatigue noted. Patient would benefit from continued PT      Plan: Continue per plan of care.  Progress treatment as tolerated.       Precautions: N/A  NLLQWTHZ    POC expires Unit limit Auth Expiration date PT/OT/ST + Visit Limit?   5/10/24 N/a pending BOMN                           Visit/Unit Tracking  AUTH Status:  Date 3/15              pending Used 1               Remaining                      Shld flex,            Manuals 3/15 3/22 3/28 4/1 4/4 4/12 4/24 4/26 5/8 5/15   Prone CTJ mobs Gd 4-5 BRENDA Gd 4-5 jt mob Gd 4-5 brenda mob          Shld joint mobs             Prone CTJ CPA/UPA   BRENDA gd 2-3          Manual passive radial nerve tensioner        BRENDA     Post gh jt mob         90d abd gd 4-4+ BRENDA 90d abd gd 4- to 4+ BRENDA   MRE         Post glide into horiz add BRENDA Post glide into horiz add BRENDA   Neuro Re-Ed             Prone T  3x10  2# 2x15 3# 2x15 3# 2x15 3# 2x15 3# 2x15  4# 2x15     Prone Y  3x10 2# 2x15 3# 2x15  3# 2x15 3# 2x15 3# 2x15     Prone I        Scap retract then ext 2x10   4#      Standing shld flexion with band    BTB 1x10         Supine scap protraction     1x15 10#; 1x15 15# 1x15 10#; 1x15 15#  "1x15 10#  1x15  15#  2x15 15# 3x15 15#    Quadruped weight shift      10\"x0 declined 2x10     Quadruped UE lift (R only)      10x Declined  2x10 on box full plank     Scap protraction on plinth          2x10   Ther Ex             Cervical SNAG 5x HEP            Three finger cervical rotation 5x HEP            Sidelying ER  3# 3x15 3# 3x15 3# 3x15 3# 2x15, 4# 1x15 3# 3x15 3# 2x15  4#   1x15  4# 2x15     Rows       2x10 GTB      Shoulder Ext       2x10 RTB      Scap push up             DB chest press          3# 1x10, 5# 1x15   Seated ER on knee    2x15 2#         UBE     3'/3' 3'/3' 3'/3'  3'/3' 3'/3'   Pt edu BRENDA BRENDA BRENDA          Ther Activity                                       Gait Training                                       Modalities                                                                   "

## 2024-05-17 ENCOUNTER — OFFICE VISIT (OUTPATIENT)
Dept: PHYSICAL THERAPY | Facility: CLINIC | Age: 44
End: 2024-05-17
Payer: COMMERCIAL

## 2024-05-17 DIAGNOSIS — M75.42 SUBACROMIAL IMPINGEMENT OF LEFT SHOULDER: Primary | ICD-10-CM

## 2024-05-17 DIAGNOSIS — M67.912 DYSFUNCTION OF LEFT ROTATOR CUFF: ICD-10-CM

## 2024-05-17 PROCEDURE — 97112 NEUROMUSCULAR REEDUCATION: CPT | Performed by: PHYSICAL THERAPIST

## 2024-05-17 PROCEDURE — 97140 MANUAL THERAPY 1/> REGIONS: CPT | Performed by: PHYSICAL THERAPIST

## 2024-05-17 NOTE — PROGRESS NOTES
Daily Note     Today's date: 2024  Patient name: Kennedy Blake  : 1980  MRN: [id number]  Referring provider: Neftali Velasquez*  Dx:   Encounter Diagnosis     ICD-10-CM    1. Subacromial impingement of left shoulder  M75.42       2. Dysfunction of left rotator cuff  M67.912                      Subjective: Pt reports L shld was not sore after last session.       Objective: See treatment diary below      Assessment: Tolerated treatment well. Improved asterisks today compared to previous visit upon arrival only slight pain with inclined push-ups. Less pain and restriction with post gh jt mobs. Added horizontal abduction with post glide without pain and able to get below parallel to the floor today. No pain with chest press as well as ability to get below parallel today. Improving coordination with scap protraction/retraction. Patient would benefit from continued PT      Plan: Continue per plan of care.  Progress treatment as tolerated.       Precautions: N/A  NLLQWTHZ    POC expires Unit limit Auth Expiration date PT/OT/ST + Visit Limit?   5/10/24 N/a pending BOMN                           Visit/Unit Tracking  AUTH Status:  Date 3/15              pending Used 1               Remaining                      Shld flex,            Manuals 5/17 3/22 3/28 4/1 4/4 4/12 4/24 4/26 5/8 5/15   Prone CTJ mobs  Gd 4-5 jt mob Gd 4-5 brenda mob          Shld joint mobs             Prone CTJ CPA/UPA   BRENDA gd 2-3          Manual passive radial nerve tensioner        BRENDA     Post gh jt mob 90d abd gd 4- to 4+ BRENDA        90d abd gd 4-4+ BRENDA 90d abd gd 4- to 4+ BRENDA   MRE Post glide into horiz abd BRENDA        Post glide into horiz add BRENDA Post glide into horiz add BRENDA   Neuro Re-Ed             Prone T  3x10  2# 2x15 3# 2x15 3# 2x15 3# 2x15 3# 2x15  4# 2x15     Prone Y  3x10 2# 2x15 3# 2x15  3# 2x15 3# 2x15 3# 2x15     Prone I        Scap retract then ext 2x10   4#      Standing shld flexion with band    BTB 1x10         Supine  "scap protraction     1x15 10#; 1x15 15# 1x15 10#; 1x15 15# 1x15 10#  1x15  15#  2x15 15# 3x15 15#    Quadruped weight shift      10\"x0 declined 2x10     Quadruped UE lift (R only)      10x Declined  2x10 on box full plank     Scap protraction on plinth 2x10; 1x5 on floor         2x10   Ther Ex             Cervical SNAG             Three finger cervical rotation             Sidelying ER  3# 3x15 3# 3x15 3# 3x15 3# 2x15, 4# 1x15 3# 3x15 3# 2x15  4#   1x15  4# 2x15     Rows       2x10 GTB      Shoulder Ext       2x10 RTB      Scap push up             DB chest press 5# 3x10         3# 1x10, 5# 1x15   Seated ER on knee    2x15 2#         UBE     3'/3' 3'/3' 3'/3'  3'/3' 3'/3'   Pt edu  BRENDA BRENDA          Ther Activity                                       Gait Training                                       Modalities                                                                     "

## 2024-05-20 ENCOUNTER — OFFICE VISIT (OUTPATIENT)
Dept: OBGYN CLINIC | Facility: OTHER | Age: 44
End: 2024-05-20
Payer: COMMERCIAL

## 2024-05-20 VITALS
WEIGHT: 180 LBS | DIASTOLIC BLOOD PRESSURE: 70 MMHG | HEART RATE: 64 BPM | HEIGHT: 69 IN | SYSTOLIC BLOOD PRESSURE: 105 MMHG | BODY MASS INDEX: 26.66 KG/M2

## 2024-05-20 DIAGNOSIS — M75.42 SUBACROMIAL IMPINGEMENT OF LEFT SHOULDER: Primary | ICD-10-CM

## 2024-05-20 PROCEDURE — 99213 OFFICE O/P EST LOW 20 MIN: CPT | Performed by: ORTHOPAEDIC SURGERY

## 2024-05-20 RX ORDER — DOXYCYCLINE 100 MG/1
100 TABLET ORAL DAILY
COMMUNITY
Start: 2024-04-17

## 2024-05-20 NOTE — PROGRESS NOTES
I personally examined the patient and reviewed the history provided.  I agree with the note and the assessment and plan by Dr. Carlo John MD.       Assessment  Diagnoses and all orders for this visit:    Subacromial impingement of left shoulder        Discussion and Plan:    Patient has had significant improvement of her left shoulder impingement syndrome.  She still has mild pain with push-ups but is otherwise doing very well with physical therapy and improved with the injection.  She may continue physical therapy with at home exercise program and follow-up as needed.  Outside records reviewed.    Subjective:   Patient ID: Kennedy Blake is a 43 y.o. female      HPI  43-year-old female last seen in March for left subacromial impingement syndrome.  She was provided a subacromial steroid injection and referral to physical therapy.  She states that she has had significant improvement with physical therapy and the injection.  She states that she only has mild pain with push-ups.  She is here today for evaluation is overall doing well.  We      The following portions of the patient's history were reviewed and updated as appropriate: allergies, current medications, past family history, past medical history, past social history, past surgical history and problem list.    Review of Systems  Constitutional: Negative for chills, fever and unexpected weight change.  HENT: Negative for hearing loss, nosebleeds and sore throat.    Eyes: Negative for pain, redness and visual disturbance.  Respiratory: Negative for cough, shortness of breath and wheezing.    Cardiovascular: Negative for chest pain, palpitations and leg swelling.  Gastrointestinal: Negative for abdominal pain, nausea and vomiting.  Endocrine: Negative.    Genitourinary: Negative.    Musculoskeletal: see HPI  Skin: Negative.    Neurological: Negative.    Psychiatric/Behavioral: Negative.      Objective:  /70 (BP Location: Left arm, Patient Position:  "Sitting, Cuff Size: Standard)   Pulse 64   Ht 5' 9\" (1.753 m)   Wt 81.6 kg (180 lb)   LMP 05/04/2024 (Exact Date)   BMI 26.58 kg/m²       Ortho Exam  Left Shoulder Exam      Range of Motion   The patient has normal left shoulder ROM.     Muscle Strength   Abduction: 5/5   External rotation: 5/5      Tests   Martínez test: negative  Impingement: negative     Other   Erythema: absent  Sensation: normal  Pulse: present     Physical Exam  Physical Exam   Constitutional: Oriented to person, place, and time. Appears well-developed and well-nourished.  HENT:   Right Ear: External ear normal.   Left Ear: External ear normal.   Eyes: Conjunctivae are normal. Pupils are equal, round, and reactive to light.  Neck: Normal range of motion. Neck supple.  Cardiovascular: Normal rate and regular rhythm.    Pulmonary/Chest: Effort normal. No respiratory distress.  Abdominal: Soft. exhibits no distension.  Neurological: alert and oriented to person, place, and time.  Skin: Skin is warm and dry.  Psychiatric: normal mood and affect, behavior is normal. Thought content normal.         "

## 2024-05-22 ENCOUNTER — OFFICE VISIT (OUTPATIENT)
Dept: PHYSICAL THERAPY | Facility: CLINIC | Age: 44
End: 2024-05-22
Payer: COMMERCIAL

## 2024-05-22 DIAGNOSIS — M75.42 SUBACROMIAL IMPINGEMENT OF LEFT SHOULDER: Primary | ICD-10-CM

## 2024-05-22 DIAGNOSIS — M67.912 DYSFUNCTION OF LEFT ROTATOR CUFF: ICD-10-CM

## 2024-05-22 PROCEDURE — 97112 NEUROMUSCULAR REEDUCATION: CPT | Performed by: PHYSICAL THERAPIST

## 2024-05-22 PROCEDURE — 97140 MANUAL THERAPY 1/> REGIONS: CPT | Performed by: PHYSICAL THERAPIST

## 2024-05-22 NOTE — PROGRESS NOTES
"Daily Note     Today's date: 2024  Patient name: Kennedy Blake  : 1980  MRN: [id number]  Referring provider: Neftali Velasquez*  Dx:   Encounter Diagnosis     ICD-10-CM    1. Subacromial impingement of left shoulder  M75.42       2. Dysfunction of left rotator cuff  M67.912                      Subjective: Pt reports having some increased L shld soreness following last visit that is still there today.       Objective: See treatment diary below      Assessment: Tolerated treatment well. Continuing improved motor control of scapula in multiple positions. Minimal pain with push up on plinth today. Significantly improved scapular motion in Wbing today. Patient would benefit from continued PT      Plan: Continue per plan of care.  Progress treatment as tolerated.       Precautions: N/A  NLLQWTHZ    POC expires Unit limit Auth Expiration date PT/OT/ST + Visit Limit?   5/10/24 N/a pending BOMN                           Visit/Unit Tracking  AUTH Status:  Date 3/15              pending Used 1               Remaining                                 Manuals 5/17 5/22 3/28 4/1 4/4 4/12 4/24 4/26 5/8 5/15   Prone CTJ mobs   Gd 4-5 brenda mob          Shld joint mobs             Prone CTJ CPA/UPA   BRENDA gd 2-3          Manual passive radial nerve tensioner        BRENDA     Post gh jt mob 90d abd gd 4- to 4+ BRENDA 90d abd gd 4- to 4+ BRENDA       90d abd gd 4-4+ BRENDA 90d abd gd 4- to 4+ BRENDA   MRE Post glide into horiz abd BRENDA Post glide into horiz abd BRENDA       Post glide into horiz add BRENDA Post glide into horiz add BRENDA   Neuro Re-Ed             Prone T   2# 2x15 3# 2x15 3# 2x15 3# 2x15 3# 2x15  4# 2x15     Prone Y   2# 2x15 3# 2x15  3# 2x15 3# 2x15 3# 2x15     Prone I        Scap retract then ext 2x10   4#      Standing shld flexion with band    BTB 1x10         Supine scap protraction     1x15 10#; 1x15 15# 1x15 10#; 1x15 15# 1x15 10#  1x15  15#  2x15 15# 3x15 15#    Quadruped weight shift      10\"x0 declined 2x10     Quadruped " UE lift (R only)      10x Declined  2x10 on box full plank     Scap protraction on plinth 2x10; 1x5 on floor 3x10 ; 3x3 on floor        2x10   Ther Ex             Cervical SNAG             Three finger cervical rotation             Sidelying ER   3# 3x15 3# 3x15 3# 2x15, 4# 1x15 3# 3x15 3# 2x15  4#   1x15  4# 2x15     Rows       2x10 GTB      Shoulder Ext       2x10 RTB      Scap push up             DB chest press 5# 3x10 5# 3x12        3# 1x10, 5# 1x15   Seated ER on knee    2x15 2#         UBE     3'/3' 3'/3' 3'/3'  3'/3' 3'/3'   Pt edu   BRENDA          Ther Activity                                       Gait Training                                       Modalities

## 2024-05-24 ENCOUNTER — APPOINTMENT (OUTPATIENT)
Dept: PHYSICAL THERAPY | Facility: CLINIC | Age: 44
End: 2024-05-24
Payer: COMMERCIAL

## 2024-05-29 ENCOUNTER — APPOINTMENT (OUTPATIENT)
Dept: PHYSICAL THERAPY | Facility: CLINIC | Age: 44
End: 2024-05-29
Payer: COMMERCIAL

## 2024-05-31 ENCOUNTER — APPOINTMENT (OUTPATIENT)
Dept: PHYSICAL THERAPY | Facility: CLINIC | Age: 44
End: 2024-05-31
Payer: COMMERCIAL

## 2024-06-03 ENCOUNTER — APPOINTMENT (OUTPATIENT)
Dept: PHYSICAL THERAPY | Facility: CLINIC | Age: 44
End: 2024-06-03
Payer: COMMERCIAL

## 2024-06-05 ENCOUNTER — APPOINTMENT (OUTPATIENT)
Dept: PHYSICAL THERAPY | Facility: CLINIC | Age: 44
End: 2024-06-05
Payer: COMMERCIAL

## 2024-06-07 ENCOUNTER — EVALUATION (OUTPATIENT)
Dept: PHYSICAL THERAPY | Facility: CLINIC | Age: 44
End: 2024-06-07
Payer: COMMERCIAL

## 2024-06-07 DIAGNOSIS — M67.912 DYSFUNCTION OF LEFT ROTATOR CUFF: ICD-10-CM

## 2024-06-07 DIAGNOSIS — M75.42 SUBACROMIAL IMPINGEMENT OF LEFT SHOULDER: Primary | ICD-10-CM

## 2024-06-07 PROCEDURE — 97140 MANUAL THERAPY 1/> REGIONS: CPT | Performed by: PHYSICAL THERAPIST

## 2024-06-07 PROCEDURE — 97530 THERAPEUTIC ACTIVITIES: CPT | Performed by: PHYSICAL THERAPIST

## 2024-06-07 PROCEDURE — 97110 THERAPEUTIC EXERCISES: CPT | Performed by: PHYSICAL THERAPIST
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
## 2024-06-10 ENCOUNTER — APPOINTMENT (OUTPATIENT)
Dept: PHYSICAL THERAPY | Facility: CLINIC | Age: 44
End: 2024-06-10
Payer: COMMERCIAL

## 2024-06-14 ENCOUNTER — APPOINTMENT (OUTPATIENT)
Dept: PHYSICAL THERAPY | Facility: CLINIC | Age: 44
End: 2024-06-14
Payer: COMMERCIAL

## 2024-06-21 ENCOUNTER — TELEPHONE (OUTPATIENT)
Dept: NEUROLOGY | Facility: CLINIC | Age: 44
End: 2024-06-21

## 2024-06-21 ENCOUNTER — APPOINTMENT (OUTPATIENT)
Dept: PHYSICAL THERAPY | Facility: CLINIC | Age: 44
End: 2024-06-21
Payer: COMMERCIAL

## 2024-06-27 ENCOUNTER — CONSULT (OUTPATIENT)
Dept: NEUROLOGY | Facility: CLINIC | Age: 44
End: 2024-06-27
Payer: COMMERCIAL

## 2024-06-27 VITALS
SYSTOLIC BLOOD PRESSURE: 112 MMHG | TEMPERATURE: 98.2 F | WEIGHT: 177.8 LBS | OXYGEN SATURATION: 98 % | DIASTOLIC BLOOD PRESSURE: 74 MMHG | BODY MASS INDEX: 26.26 KG/M2 | HEART RATE: 65 BPM

## 2024-06-27 DIAGNOSIS — R20.0 NUMBNESS: Primary | ICD-10-CM

## 2024-06-27 DIAGNOSIS — R29.898 RIGHT ARM WEAKNESS: ICD-10-CM

## 2024-06-27 PROCEDURE — 99204 OFFICE O/P NEW MOD 45 MIN: CPT | Performed by: PSYCHIATRY & NEUROLOGY

## 2024-06-27 NOTE — PROGRESS NOTES
"Please note: this note was created with voice recognition software. Occasional wrong word or \"sound alike\" substitutions may occur due to the inherent limitations of voice recognition software. Read the chart carefully and recognize (using context) where substitutions may have occurred. I am available to discuss any questions.       1. Numbness  -     EMG 1 Limb; Future  2. Right arm weakness  Assessment & Plan:  Ms Blake reports weakness with discomfort of the right arm.  I was not able to find objective neurologic deficits on my examination.  Based on the onset of her symptoms, I would have been concerned that she could have had an upper extremity mononeuropathy.  I think her long-term prognosis appears good and I have given her a prescription for physical therapy.  However, I am going to perform an EMG of the right arm.  Given that she is a  it is possible that she has very minimal weakness that I am not able to appreciate on my examination.  Further measures will be considered based on the test results and her clinical course.  Orders:  -     Ambulatory Referral to Physical Therapy; Future      Problem List Items Addressed This Visit       Numbness - Primary    Relevant Orders    EMG 1 Limb    Right arm weakness     Ms Blake reports weakness with discomfort of the right arm.  I was not able to find objective neurologic deficits on my examination.  Based on the onset of her symptoms, I would have been concerned that she could have had an upper extremity mononeuropathy.  I think her long-term prognosis appears good and I have given her a prescription for physical therapy.  However, I am going to perform an EMG of the right arm.  Given that she is a  it is possible that she has very minimal weakness that I am not able to appreciate on my examination.  Further measures will be considered based on the test results and her clinical course.         Relevant Orders    Ambulatory " Referral to Physical Therapy       Problem List       Anemia    Blood in stool    Current moderate episode of major depressive disorder (HCC)    Dysfunction of left rotator cuff    External hemorrhoids    Numbness    Right arm weakness    Current Assessment & Plan     Ms Blake reports weakness with discomfort of the right arm.  I was not able to find objective neurologic deficits on my examination.  Based on the onset of her symptoms, I would have been concerned that she could have had an upper extremity mononeuropathy.  I think her long-term prognosis appears good and I have given her a prescription for physical therapy.  However, I am going to perform an EMG of the right arm.  Given that she is a  it is possible that she has very minimal weakness that I am not able to appreciate on my examination.  Further measures will be considered based on the test results and her clinical course.         Stress incontinence of urine    Subacromial impingement of left shoulder    Weight gain         I had the pleasure of seeing Kennedy Blake, a 43 y.o. female, for neuromuscular consultation. The referral was for weakness.     Several months ago she fell asleep with her right arm maintained in flexion and her head on her arm.  Since then she has aching in the extensor surface of the right forearm.  It has been difficult for her to open a pickle jar or to  as tightly during yoga class.  Usually the pain is a 2/10 but with use can be as bad as a 4/10.  There is no involvement of other extremities.  There is no history of incontinence or diminished sensation.  Very rarely she has tingling in the feet but this appears to be a separate issue.      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    Social History     Tobacco  Use   Smoking Status Never   Smokeless Tobacco Never       Social History     Substance and Sexual Activity   Alcohol Use Never       Social History     Substance and Sexual Activity   Drug Use Never       Family History   Problem Relation Age of Onset    Hypertension Mother     Diabetes Mother     Diabetes Father     Lymphoma Father     Breast cancer Maternal Grandmother     Coronary artery disease Maternal Grandfather          Current Outpatient Medications:     albuterol (Ventolin HFA) 90 mcg/act inhaler, Inhale 2 puffs every 6 (six) hours as needed for wheezing, Disp: 8 g, Rfl: 1    Azelastine HCl 137 MCG/SPRAY SOLN, , Disp: , Rfl:     ciclopirox (PENLAC) 8 % solution, APPLY TO NAILS DAILY - REMOVE ON 7TH DAY WITH POLISH REMOVER, Disp: , Rfl:     ferrous sulfate 324 (65 Fe) mg, TAKE 1 TABLET BY MOUTH 2 TIMES A DAY BEFORE MEALS., Disp: 180 tablet, Rfl: 1    hydrocortisone (ANUSOL-HC) 2.5 % rectal cream, Apply topically 2 (two) times a day, Disp: 28 g, Rfl: 1    sertraline (ZOLOFT) 25 mg tablet, Take 1 tablet (25 mg total) by mouth daily at bedtime, Disp: 90 tablet, Rfl: 3    sertraline (ZOLOFT) 50 mg tablet, Take 1 tablet (50 mg total) by mouth daily, Disp: 90 tablet, Rfl: 2    doxycycline (ADOXA) 100 MG tablet, Take 100 mg by mouth daily (Patient not taking: Reported on 6/27/2024), Disp: , Rfl:     mupirocin (BACTROBAN) 2 % ointment, Apply topically 3 (three) times a day (Patient not taking: Reported on 6/27/2024), Disp: 60 g, Rfl: 1    valACYclovir (VALTREX) 1,000 mg tablet, Take 2 tablets by mouth every 12 hours x 1 day (Patient not taking: Reported on 6/27/2024), Disp: 10 tablet, Rfl: 1    Office Visit on 03/06/2024   Component Date Value Ref Range Status    LEUKOCYTE ESTERASE,UA 03/06/2024 neg   Final    NITRITE,UA 03/06/2024 neg   Final    SL AMB POCT UROBILINOGEN 03/06/2024 3.5   Final    POCT URINE PROTEIN 03/06/2024 neg   Final     PH,UA 03/06/2024 6.0   Final    BLOOD,UA 03/06/2024 2+ 80   Final     SPECIFIC GRAVITY,UA 03/06/2024 1.010   Final    KETONES,UA 03/06/2024 neg   Final    BILIRUBIN,UA 03/06/2024 neg   Final    GLUCOSE, UA 03/06/2024 neg   Final     COLOR,UA 03/06/2024 yellow   Final    CLARITY,UA 03/06/2024 cloudy   Final    EXT Preg Test, Ur 03/06/2024 Negative  Negative Final    Control 03/06/2024 Valid  Valid Final    Bacterial Vaginosis 03/06/2024 Positive (A)  Negative Final         Candida species 03/06/2024 Negative  Negative Final         Candida glabrata 03/06/2024 Negative  Negative Final         Jacki krusei 03/06/2024 Negative  Negative Final         Trichomonas vaginalis 03/06/2024 Negative  Negative Final         N gonorrhoeae, DNA Probe 03/06/2024 Negative  Negative Final    Chlamydia trachomatis, DNA Probe 03/06/2024 Negative  Negative Final    Urine Culture 03/06/2024 No Growth <1000 cfu/mL   Final   Office Visit on 01/24/2024   Component Date Value Ref Range Status    Wound Culture 01/24/2024 1+ Growth of Methicillin Resistant Staphylococcus aureus (A)   Final    This organism has been edited. The previous result was Staphylococcus aureus on 1/25/2024 at 1120 EST.  Please note: This patient requires contact precautions.    Wound Culture 01/24/2024 1+ Growth of   Final    Mixed Skin Malissa    Gram Stain Result 01/24/2024 1+ Polys (A)   Final    Gram Stain Result 01/24/2024 1+ Gram positive rods (A)   Final   Office Visit on 12/28/2023   Component Date Value Ref Range Status    POCT SARS-CoV-2 Ag 12/28/2023 Negative  Negative Final    VALID CONTROL 12/28/2023 Valid   Final        No results found for this or any previous visit.     No results found for this or any previous visit.    No results found for this or any previous visit.    No results found for this or any previous visit.    No results found for this or any previous visit.    No results found for this or any previous visit.    No results found for this or any previous visit.    No results found for this or any previous  visit.    No results found for this or any previous visit.    No results found for this or any previous visit.    No results found for this or any previous visit.    No results found for this or any previous visit.      Review of Systems   Constitutional:  Negative for appetite change, fatigue and fever.   HENT: Negative.  Negative for hearing loss, tinnitus, trouble swallowing and voice change.    Eyes: Negative.  Negative for photophobia, pain and visual disturbance.   Respiratory: Negative.  Negative for shortness of breath.    Cardiovascular: Negative.  Negative for palpitations.   Gastrointestinal: Negative.  Negative for nausea and vomiting.   Endocrine: Negative.  Negative for cold intolerance.   Genitourinary: Negative.  Negative for dysuria, frequency and urgency.   Musculoskeletal:  Negative for back pain, gait problem, myalgias, neck pain and neck stiffness.   Skin: Negative.  Negative for rash.   Allergic/Immunologic: Negative.    Neurological:  Positive for weakness (no strength in R arm). Negative for dizziness, tremors, seizures, syncope, facial asymmetry, speech difficulty, light-headedness, numbness and headaches.        Ulnar pain r arm x a few months, numbness /tingling in b/l feet   Hematological: Negative.  Does not bruise/bleed easily.   Psychiatric/Behavioral: Negative.  Negative for confusion, hallucinations and sleep disturbance.          On examination,     Blood pressure 112/74, pulse 65, temperature 98.2 °F (36.8 °C), temperature source Temporal, weight 80.6 kg (177 lb 12.8 oz), SpO2 98%.    Well developed, well nourished, in no acute distress    Normocephalic, atraumatic    Heart: regular rate and rhythm  I did not hear a carotid bruit    Extremities: no clubbing, cyanosis, or edema    Speech and cognition appeared normal    Cranial nerves:  II: Pupils equal, round, and reactive to light. No gross visual field defect. I did not appreciate optic disc edema.  III, IV, VI: Extraocular  movements intact  V: Normal facial sensation in all three divisions of the trigeminal nerve bilaterally  VII: Normal facial strength  VIII: Hearing intact to finger rub bilaterally  IX, X: Palate elevated symmetrically  XI: Sternocleidomastoid strength normal bilaterally  XII: Tongue protruded in midline without atrophy or fibrillations    Motor:  Normal tone and bulk throughout.   Muscle strength testing by the MRC scale (listed below) was 5/5 in the deltoid, biceps, triceps, wrist extensors, wrist flexors, finger extensors, finger flexors,. Hip flexors, quadriceps, ankle dorsiflexors, ankle plantar flexors, and EHL bilaterally  MRC scale:  0/5 No contraction is present   1/5 Incomplete range of active motion, even when gravity is eliminated   2/5 Complete active range of motion with gravity eliminated   3/5 Full motion against gravity, but not with any resistance  4/5 The patient is able to complete the action against gravity and some resistance by the examiner   5/5 Completely normal strength, overcoming gravity and all resistance by the examiner     Deep tendon reflexes:   2+ and symmetrical in the biceps, triceps, brachioradialis, patellas, and ankles  Toes downgoing (no Babinski sign)  No Price's sign    Sensation: Normal pinprick and light touch throughout    Cerebellar: normal finger to nose and heel to shin testing    Gait: Normal             There are no Patient Instructions on file for this visit.      Thank you very much for allowing me to participate in your patient's care. Please feel free to contact me for any questions or concerns. Please be aware of the inherent limitations of voice recognition software, which may result in transcriptional errors.    Sree Chavira MD

## 2024-06-27 NOTE — ASSESSMENT & PLAN NOTE
Ms Blake reports weakness with discomfort of the right arm.  I was not able to find objective neurologic deficits on my examination.  Based on the onset of her symptoms, I would have been concerned that she could have had an upper extremity mononeuropathy.  I think her long-term prognosis appears good and I have given her a prescription for physical therapy.  However, I am going to perform an EMG of the right arm.  Given that she is a  it is possible that she has very minimal weakness that I am not able to appreciate on my examination.  Further measures will be considered based on the test results and her clinical course.

## 2024-08-06 ENCOUNTER — HOSPITAL ENCOUNTER (OUTPATIENT)
Dept: NEUROLOGY | Facility: CLINIC | Age: 44
Discharge: HOME | End: 2024-08-06
Payer: COMMERCIAL

## 2024-08-06 DIAGNOSIS — R20.0 NUMBNESS: ICD-10-CM

## 2024-08-06 PROCEDURE — 95886 MUSC TEST DONE W/N TEST COMP: CPT | Performed by: PSYCHIATRY & NEUROLOGY

## 2024-08-06 PROCEDURE — 95910 NRV CNDJ TEST 7-8 STUDIES: CPT | Performed by: PSYCHIATRY & NEUROLOGY

## 2024-08-19 DIAGNOSIS — F32.1 CURRENT MODERATE EPISODE OF MAJOR DEPRESSIVE DISORDER, UNSPECIFIED WHETHER RECURRENT (HCC): ICD-10-CM

## 2024-08-21 ENCOUNTER — EVALUATION (OUTPATIENT)
Dept: PHYSICAL THERAPY | Facility: CLINIC | Age: 44
End: 2024-08-21
Payer: COMMERCIAL

## 2024-08-21 DIAGNOSIS — R29.898 RIGHT ARM WEAKNESS: Primary | ICD-10-CM

## 2024-08-21 PROCEDURE — 97161 PT EVAL LOW COMPLEX 20 MIN: CPT | Performed by: PHYSICAL THERAPIST

## 2024-08-21 PROCEDURE — 97140 MANUAL THERAPY 1/> REGIONS: CPT | Performed by: PHYSICAL THERAPIST

## 2024-08-21 NOTE — PROGRESS NOTES
PT Evaluation     Today's date: 2024  Patient name: Kennedy Blake  : 1980  MRN: [id number]  Referring provider: Sree Chavira MD  Dx:   Encounter Diagnosis     ICD-10-CM    1. Right arm weakness  R29.898                      Assessment  Impairments: abnormal or restricted ROM, activity intolerance, impaired physical strength and pain with function    Assessment details: Pt is a 42 y/o female who presents to physical therapy with primary nociceptive pain associated with R lateral epicondylalgia in the environment of L shld pain complicated by work requirements utilizing a computer, as well as yoga routine requirements. Pt does not present with any red flag symptoms at this time. Pt presents with lateral epicondylalgia due to pain with stretch and activation of the common extensor tendon. Significant improvement noted with mobilization with movement today, increasing pain free  strength from 39.9 to 56.2# as well as a significant reduction in elbow pain with plank positioning. Education provided regarding POC, prognosis and HEP, pt verablized understanding. Pt would benefit from skilled physical therapy in order to decrease deficits and return to prior level of function.    Understanding of Dx/Px/POC: good    Goals  STG (4 weeks):  Pt will be independent with HEP.  Pt will demonstrate painfree  to >60#.   Pt will demonstrate plank with <3/10 pain.     LTG (8 weeks):  FOTO will be expected outcome.   Pt will demonstrate painfree  >65#.   Pt will demonstrate no pain with plank.       Plan  Patient would benefit from: skilled physical therapy  Planned modality interventions: cryotherapy    Planned therapy interventions: manual therapy, neuromuscular re-education, patient education, self care, strengthening, stretching, therapeutic activities, therapeutic exercise and home exercise program    Frequency: 1-2x/week.  Duration in weeks: 8  Treatment plan discussed with:  patient    Subjective Evaluation    History of Present Illness  Mechanism of injury: Chief Complaint: Pt reports that elbow pain began after waking from a sleep where she was lying on her R side with her elbow bent and tucked under her head. She states that after this she began noting a sharp pain in the posterior forearm, originally it was fairly constant, hurting with turning a doorknob and computer work. She states that now it is improved, however, she still has pain with computer work, bearing weight in a plank position, and gripping tasks. She did have an EMG a few weeks ago that was negative.     Severity: moderate to severe  Irritability: moderate  Nature: nociceptive  Stage: chronic  Stability: improving    P1: see body chart  Patient Goals  Patient goals for therapy: decreased pain and increased strength  Patient goal: be able to do a plank position without pain    Objective     Observations     Additional Observation Details  Reduced cervical lordosis, no apparent neck shift    Neurological Testing     Additional Neurological Details  (-) Radial neurodynamic test    Active Range of Motion     Additional Active Range of Motion Details  WNL in all directions, however, pain with full elbow flexion    Wrist: WNL in all directions to except pain with wrist ext    Passive Range of Motion     Additional Passive Range of Motion Details  Pain with wrist flexion, all other elbow and wrist passive motions WNL and non-painful    Strength/Myotome Testing     Additional Strength Details  :  L: 65.2#  R: 39.9# pain free, 52.0# max with pain    Tests     Additional Tests Details  (+) Mill's             Precautions: None    POC expires Unit limit Auth Expiration date PT/OT/ST + Visit Limit?   10/16/24 BOMN pending BOMN                           Visit/Unit Tracking  AUTH Status:  Date 8/21               Used 1               Remaining                         Plank, Pain free             Manuals 8/21            MWM  lat glide elbow BRENDA 2x10                                                   Neuro Re-Ed                                                                                                        Ther Ex             Lat paula MWM HEP BRENDA            Wtd Wrist extension                                                                              Pt edu BRENDA            Ther Activity                                       Gait Training                                       Modalities

## 2024-08-22 ENCOUNTER — HOSPITAL ENCOUNTER (OUTPATIENT)
Age: 44
Discharge: HOME | End: 2024-08-22
Payer: COMMERCIAL

## 2024-08-22 DIAGNOSIS — Z12.31 ENCOUNTER FOR SCREENING MAMMOGRAM FOR MALIGNANT NEOPLASM OF BREAST: ICD-10-CM

## 2024-08-22 PROCEDURE — 77067 SCR MAMMO BI INCL CAD: CPT

## 2024-08-22 PROCEDURE — 77063 BREAST TOMOSYNTHESIS BI: CPT

## 2024-08-23 ENCOUNTER — OFFICE VISIT (OUTPATIENT)
Dept: FAMILY MEDICINE CLINIC | Facility: CLINIC | Age: 44
End: 2024-08-23
Payer: COMMERCIAL

## 2024-08-23 VITALS
SYSTOLIC BLOOD PRESSURE: 116 MMHG | OXYGEN SATURATION: 97 % | DIASTOLIC BLOOD PRESSURE: 70 MMHG | BODY MASS INDEX: 26.26 KG/M2 | HEART RATE: 60 BPM | HEIGHT: 69 IN

## 2024-08-23 DIAGNOSIS — Z00.00 HEALTHCARE MAINTENANCE: ICD-10-CM

## 2024-08-23 DIAGNOSIS — R19.7 DIARRHEA, UNSPECIFIED TYPE: ICD-10-CM

## 2024-08-23 DIAGNOSIS — Z11.3 SCREEN FOR STD (SEXUALLY TRANSMITTED DISEASE): ICD-10-CM

## 2024-08-23 DIAGNOSIS — D50.9 IRON DEFICIENCY ANEMIA, UNSPECIFIED IRON DEFICIENCY ANEMIA TYPE: ICD-10-CM

## 2024-08-23 DIAGNOSIS — B37.31 YEAST VAGINITIS: ICD-10-CM

## 2024-08-23 DIAGNOSIS — R35.0 FREQUENCY OF URINATION: ICD-10-CM

## 2024-08-23 DIAGNOSIS — N30.01 ACUTE CYSTITIS WITH HEMATURIA: Primary | ICD-10-CM

## 2024-08-23 DIAGNOSIS — R09.81 CONGESTION OF NASAL SINUS: ICD-10-CM

## 2024-08-23 DIAGNOSIS — R30.0 DYSURIA: ICD-10-CM

## 2024-08-23 LAB
SARS-COV-2 AG UPPER RESP QL IA: NEGATIVE
SL AMB  POCT GLUCOSE, UA: NORMAL
SL AMB LEUKOCYTE ESTERASE,UA: NORMAL
SL AMB POCT BILIRUBIN,UA: NORMAL
SL AMB POCT BLOOD,UA: NORMAL
SL AMB POCT CLARITY,UA: CLEAR
SL AMB POCT COLOR,UA: YELLOW
SL AMB POCT KETONES,UA: NORMAL
SL AMB POCT NITRITE,UA: NORMAL
SL AMB POCT PH,UA: 6
SL AMB POCT SPECIFIC GRAVITY,UA: 1
SL AMB POCT URINE PROTEIN: NORMAL
SL AMB POCT UROBILINOGEN: NORMAL
VALID CONTROL: NORMAL

## 2024-08-23 PROCEDURE — 87077 CULTURE AEROBIC IDENTIFY: CPT | Performed by: NURSE PRACTITIONER

## 2024-08-23 PROCEDURE — 81002 URINALYSIS NONAUTO W/O SCOPE: CPT | Performed by: NURSE PRACTITIONER

## 2024-08-23 PROCEDURE — 87811 SARS-COV-2 COVID19 W/OPTIC: CPT | Performed by: NURSE PRACTITIONER

## 2024-08-23 PROCEDURE — 87491 CHLMYD TRACH DNA AMP PROBE: CPT | Performed by: NURSE PRACTITIONER

## 2024-08-23 PROCEDURE — 87086 URINE CULTURE/COLONY COUNT: CPT | Performed by: NURSE PRACTITIONER

## 2024-08-23 PROCEDURE — 87186 SC STD MICRODIL/AGAR DIL: CPT | Performed by: NURSE PRACTITIONER

## 2024-08-23 PROCEDURE — 99214 OFFICE O/P EST MOD 30 MIN: CPT | Performed by: NURSE PRACTITIONER

## 2024-08-23 PROCEDURE — 87591 N.GONORRHOEAE DNA AMP PROB: CPT | Performed by: NURSE PRACTITIONER

## 2024-08-23 RX ORDER — FLUCONAZOLE 150 MG/1
TABLET ORAL
Qty: 2 TABLET | Refills: 0 | Status: SHIPPED | OUTPATIENT
Start: 2024-08-23 | End: 2024-08-26

## 2024-08-23 RX ORDER — NITROFURANTOIN 25; 75 MG/1; MG/1
100 CAPSULE ORAL 2 TIMES DAILY
Qty: 10 CAPSULE | Refills: 0 | Status: SHIPPED | OUTPATIENT
Start: 2024-08-23 | End: 2024-08-29

## 2024-08-23 NOTE — PROGRESS NOTES
Ambulatory Visit  Name: Kennedy Blake      : 1980      MRN: [id number]  Encounter Provider: FANI Milian  Encounter Date: 2024   Encounter department: [facility] [address] [place] [address]    Assessment & Plan   1. Acute cystitis with hematuria  Comments:  Will treat with Macrobid for 5 days.  Send culture.  Advised to increase fluid intake.  Orders:  -     nitrofurantoin (MACROBID) 100 mg capsule; Take 1 capsule (100 mg total) by mouth 2 (two) times a day for 5 days  2. Dysuria  -     POCT urine dip  -     Urine culture  -     Chlamydia/GC amplified DNA by PCR  3. Frequency of urination  -     POCT urine dip  -     Urine culture  -     Chlamydia/GC amplified DNA by PCR  4. Screen for STD (sexually transmitted disease)  -     Hepatitis C antibody; Future  -     HIV 1/2 AB/AG w Reflex [facility] for 2 yr old and above; Future  -     RPR-Syphilis Screening (Total Syphilis IGG/IGM); Future  5. Healthcare maintenance  -     CBC and differential; Future  -     Iron Panel (Includes Ferritin, Iron Sat%, Iron, and TIBC); Future  -     Lipid panel; Future  -     Comprehensive metabolic panel; Future  -     TSH, 3rd generation with Free T4 reflex; Future  -     CBC and differential  -     Lipid panel  -     Comprehensive metabolic panel  -     TSH, 3rd generation with Free T4 reflex  6. Iron deficiency anemia, unspecified iron deficiency anemia type  -     Iron Panel (Includes Ferritin, Iron Sat%, Iron, and TIBC); Future  7. Diarrhea, unspecified type  -     Ambulatory Referral to Gastroenterology; Future  8. Yeast vaginitis  -     fluconazole (DIFLUCAN) 150 mg tablet; Take 1 dose today and repeat in 3 days if still symptomatic.       History of Present Illness     Patient presents with concerns of UTI symptoms.  She states that symptoms have improved with increasing her fluid intake.  She is requesting STD screening as she has had a new partner.  She states she is using protection  every time.  She is also concerned some gut issues, was recently diarrhea.  She was recently as a music festival and concerned with congestion.        Review of Systems   Constitutional:  Negative for chills, diaphoresis and fever.   HENT:  Positive for congestion and rhinorrhea. Negative for ear pain, postnasal drip and sore throat.    Eyes:  Negative for pain and visual disturbance.   Respiratory:  Negative for cough and shortness of breath.    Cardiovascular:  Negative for chest pain and palpitations.   Gastrointestinal:  Positive for diarrhea. Negative for abdominal pain, constipation, nausea and vomiting.   Genitourinary:  Positive for dysuria, frequency and urgency. Negative for hematuria.   Musculoskeletal:  Negative for arthralgias and back pain.   Skin:  Negative for color change and rash.   Neurological:  Negative for dizziness, seizures, syncope, light-headedness and headaches.   Psychiatric/Behavioral:  The patient is nervous/anxious (manageable with meds).    All other systems reviewed and are negative.    Current Outpatient Medications on File Prior to Visit   Medication Sig Dispense Refill    albuterol (Ventolin HFA) 90 mcg/act inhaler Inhale 2 puffs every 6 (six) hours as needed for wheezing 8 g 1    Azelastine HCl 137 MCG/SPRAY SOLN       ciclopirox (PENLAC) 8 % solution APPLY TO NAILS DAILY - REMOVE ON 7TH DAY WITH POLISH REMOVER      doxycycline (ADOXA) 100 MG tablet Take 100 mg by mouth daily (Patient not taking: Reported on 6/27/2024)      ferrous sulfate 324 (65 Fe) mg TAKE 1 TABLET BY MOUTH 2 TIMES A DAY BEFORE MEALS. 180 tablet 1    hydrocortisone (ANUSOL-HC) 2.5 % rectal cream Apply topically 2 (two) times a day 28 g 1    mupirocin (BACTROBAN) 2 % ointment Apply topically 3 (three) times a day (Patient not taking: Reported on 6/27/2024) 60 g 1    sertraline (ZOLOFT) 25 mg tablet Take 1 tablet (25 mg total) by mouth daily at bedtime 90 tablet 3    sertraline (ZOLOFT) 50 mg tablet TAKE 1  "TABLET BY MOUTH EVERY DAY 90 tablet 1    valACYclovir (VALTREX) 1,000 mg tablet Take 2 tablets by mouth every 12 hours x 1 day (Patient not taking: Reported on 6/27/2024) 10 tablet 1     No current facility-administered medications on file prior to visit.      Objective     /70   Pulse 60   Ht 5' 9\" (1.753 m)   SpO2 97%   BMI 26.26 kg/m²     Physical Exam  Vitals and nursing note reviewed.   Constitutional:       General: She is not in acute distress.     Appearance: She is well-developed.   HENT:      Head: Normocephalic and atraumatic.      Right Ear: Tympanic membrane normal.      Left Ear: Tympanic membrane normal.      Nose: Congestion present.      Mouth/Throat:      Pharynx: No oropharyngeal exudate.   Eyes:      Conjunctiva/sclera: Conjunctivae normal.   Cardiovascular:      Rate and Rhythm: Normal rate and regular rhythm.      Heart sounds: No murmur heard.  Pulmonary:      Effort: Pulmonary effort is normal. No respiratory distress.      Breath sounds: Normal breath sounds.   Abdominal:      Palpations: Abdomen is soft.      Tenderness: There is no abdominal tenderness.   Musculoskeletal:         General: No swelling.      Cervical back: Neck supple.   Skin:     General: Skin is warm and dry.      Capillary Refill: Capillary refill takes less than 2 seconds.   Neurological:      Mental Status: She is alert and oriented to person, place, and time.   Psychiatric:         Mood and Affect: Mood normal.       Administrative Statements   I have spent a total time of 20 minutes in caring for this patient on the day of the visit/encounter including Counseling / Coordination of care, Documenting in the medical record, Reviewing / ordering tests, medicine, procedures  , and Obtaining or reviewing history  .        "

## 2024-08-25 LAB — BACTERIA UR CULT: ABNORMAL

## 2024-08-26 ENCOUNTER — TELEPHONE (OUTPATIENT)
Age: 44
End: 2024-08-26

## 2024-08-26 ENCOUNTER — OFFICE VISIT (OUTPATIENT)
Age: 44
End: 2024-08-26
Payer: COMMERCIAL

## 2024-08-26 VITALS
DIASTOLIC BLOOD PRESSURE: 47 MMHG | WEIGHT: 177 LBS | OXYGEN SATURATION: 97 % | BODY MASS INDEX: 26.14 KG/M2 | SYSTOLIC BLOOD PRESSURE: 102 MMHG | HEART RATE: 66 BPM | TEMPERATURE: 97.8 F | RESPIRATION RATE: 18 BRPM

## 2024-08-26 DIAGNOSIS — J04.0 ACUTE LARYNGITIS: ICD-10-CM

## 2024-08-26 DIAGNOSIS — J06.9 ACUTE URI: Primary | ICD-10-CM

## 2024-08-26 LAB
BACTERIA UR CULT: ABNORMAL
C TRACH DNA SPEC QL NAA+PROBE: NEGATIVE
N GONORRHOEA DNA SPEC QL NAA+PROBE: NEGATIVE

## 2024-08-26 PROCEDURE — G0382 LEV 3 HOSP TYPE B ED VISIT: HCPCS | Performed by: PHYSICIAN ASSISTANT

## 2024-08-26 PROCEDURE — 99283 EMERGENCY DEPT VISIT LOW MDM: CPT | Performed by: PHYSICIAN ASSISTANT

## 2024-08-26 NOTE — PROGRESS NOTES
[facility]        NAME: Kennedy Blake is a 43 y.o. female  : 1980    MRN: [id number]  DATE: 2024  TIME: 11:41 AM      Assessment and Plan     Acute URI [J06.9]  1. Acute URI        2. Acute laryngitis          Note:   Likely viral - patient to use OTC medications as needed for symptoms     Patient Instructions   There are no Patient Instructions on file for this visit.     Follow up with primary care provider.   Go to ER if symptoms worsen.    Chief Complaint     Chief Complaint   Patient presents with    Cold Like Symptoms     Pt states she has loss of voice, productive cough, chills, and body aches for 3 days          History of Present Illness     Patient presents with hoarseness, cough, chills, and body aches x 3 days. She has not taken anything OTC for symptoms.         Review of Systems     Review of Systems   Constitutional:  Positive for chills. Negative for fatigue and fever.   HENT:  Positive for voice change. Negative for congestion, ear pain, postnasal drip, rhinorrhea, sinus pressure, sinus pain and sore throat.    Eyes:  Negative for pain and visual disturbance.   Respiratory:  Positive for cough. Negative for chest tightness and shortness of breath.    Cardiovascular:  Negative for chest pain and palpitations.   Gastrointestinal:  Negative for abdominal pain, diarrhea, nausea and vomiting.   Genitourinary:  Negative for dysuria and hematuria.   Musculoskeletal:  Positive for myalgias. Negative for arthralgias and back pain.   Skin:  Negative for rash.   Neurological:  Negative for dizziness, seizures, syncope, numbness and headaches.   All other systems reviewed and are negative.        Current Medications       Current Outpatient Medications:     albuterol (Ventolin HFA) 90 mcg/act inhaler, Inhale 2 puffs every 6 (six) hours as needed for wheezing, Disp: 8 g, Rfl: 1    Azelastine HCl 137 MCG/SPRAY SOLN, , Disp: , Rfl:     ciclopirox (PENLAC) 8 % solution, APPLY TO  NAILS DAILY - REMOVE ON 7TH DAY WITH POLISH REMOVER, Disp: , Rfl:     ferrous sulfate 324 (65 Fe) mg, TAKE 1 TABLET BY MOUTH 2 TIMES A DAY BEFORE MEALS., Disp: 180 tablet, Rfl: 1    hydrocortisone (ANUSOL-HC) 2.5 % rectal cream, Apply topically 2 (two) times a day, Disp: 28 g, Rfl: 1    sertraline (ZOLOFT) 25 mg tablet, Take 1 tablet (25 mg total) by mouth daily at bedtime, Disp: 90 tablet, Rfl: 3    sertraline (ZOLOFT) 50 mg tablet, TAKE 1 TABLET BY MOUTH EVERY DAY, Disp: 90 tablet, Rfl: 1    doxycycline (ADOXA) 100 MG tablet, Take 100 mg by mouth daily (Patient not taking: Reported on 6/27/2024), Disp: , Rfl:     fluconazole (DIFLUCAN) 150 mg tablet, Take 1 dose today and repeat in 3 days if still symptomatic. (Patient not taking: Reported on 8/26/2024), Disp: 2 tablet, Rfl: 0    mupirocin (BACTROBAN) 2 % ointment, Apply topically 3 (three) times a day (Patient not taking: Reported on 6/27/2024), Disp: 60 g, Rfl: 1    nitrofurantoin (MACROBID) 100 mg capsule, Take 1 capsule (100 mg total) by mouth 2 (two) times a day for 5 days (Patient not taking: Reported on 8/26/2024), Disp: 10 capsule, Rfl: 0    valACYclovir (VALTREX) 1,000 mg tablet, Take 2 tablets by mouth every 12 hours x 1 day (Patient not taking: Reported on 6/27/2024), Disp: 10 tablet, Rfl: 1    Current Allergies     Allergies as of 08/26/2024    (No Known Allergies)              The following portions of the patient's history were reviewed and updated as appropriate: allergies, current medications, past family history, past medical history, past social history, past surgical history, and problem list.     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        Objective     BP (!) 102/47   Pulse 66   Temp 97.8 °F (36.6 °C)   Resp 18   Wt 80.3 kg (177 lb)   SpO2 97%   BMI 26.14 kg/m²   No LMP recorded.         Physical Exam     Physical Exam  Vitals and nursing note reviewed.   Constitutional:       Appearance: Normal appearance. She is normal weight.   HENT:      Head: Normocephalic and atraumatic.      Right Ear: Tympanic membrane, ear canal and external ear normal.      Left Ear: Tympanic membrane, ear canal and external ear normal.      Nose: Nose normal.      Mouth/Throat:      Mouth: Mucous membranes are moist.      Pharynx: Oropharynx is clear.   Cardiovascular:      Rate and Rhythm: Normal rate and regular rhythm.      Heart sounds: Normal heart sounds.   Pulmonary:      Effort: Pulmonary effort is normal.      Breath sounds: Normal breath sounds.   Skin:     General: Skin is warm and dry.   Neurological:      General: No focal deficit present.      Mental Status: She is alert and oriented to person, place, and time.   Psychiatric:         Mood and Affect: Mood normal.         Behavior: Behavior normal.

## 2024-08-26 NOTE — TELEPHONE ENCOUNTER
Patient called in for her urine culture results.     Patient needed an appoint to have strep swab, pt has a hoarseness in her voice. No available apt pt stated she will go to urgent care.

## 2024-08-29 ENCOUNTER — HOSPITAL ENCOUNTER (OUTPATIENT)
Dept: RADIOLOGY | Facility: HOSPITAL | Age: 44
End: 2024-08-29
Payer: COMMERCIAL

## 2024-08-29 ENCOUNTER — OFFICE VISIT (OUTPATIENT)
Dept: FAMILY MEDICINE CLINIC | Facility: CLINIC | Age: 44
End: 2024-08-29
Payer: COMMERCIAL

## 2024-08-29 ENCOUNTER — APPOINTMENT (OUTPATIENT)
Dept: LAB | Facility: HOSPITAL | Age: 44
End: 2024-08-29
Payer: COMMERCIAL

## 2024-08-29 VITALS
SYSTOLIC BLOOD PRESSURE: 110 MMHG | DIASTOLIC BLOOD PRESSURE: 70 MMHG | BODY MASS INDEX: 26.22 KG/M2 | HEIGHT: 69 IN | HEART RATE: 66 BPM | WEIGHT: 177 LBS | TEMPERATURE: 95 F

## 2024-08-29 DIAGNOSIS — Z00.00 ROUTINE GENERAL MEDICAL EXAMINATION AT A HEALTH CARE FACILITY: ICD-10-CM

## 2024-08-29 DIAGNOSIS — R06.2 WHEEZING: Primary | ICD-10-CM

## 2024-08-29 DIAGNOSIS — R41.0 SUBACUTE DELIRIUM: ICD-10-CM

## 2024-08-29 DIAGNOSIS — J45.990 EXERCISE-INDUCED ASTHMA: ICD-10-CM

## 2024-08-29 DIAGNOSIS — Z00.00 HEALTHCARE MAINTENANCE: ICD-10-CM

## 2024-08-29 DIAGNOSIS — D50.9 IRON DEFICIENCY ANEMIA, UNSPECIFIED IRON DEFICIENCY ANEMIA TYPE: ICD-10-CM

## 2024-08-29 DIAGNOSIS — R53.83 OTHER FATIGUE: ICD-10-CM

## 2024-08-29 DIAGNOSIS — R53.83 LETHARGY: ICD-10-CM

## 2024-08-29 DIAGNOSIS — R06.2 WHEEZING: ICD-10-CM

## 2024-08-29 DIAGNOSIS — Z11.3 SCREEN FOR STD (SEXUALLY TRANSMITTED DISEASE): ICD-10-CM

## 2024-08-29 DIAGNOSIS — R41.0 CONFUSION: ICD-10-CM

## 2024-08-29 LAB
ALBUMIN SERPL BCG-MCNC: 4.3 G/DL (ref 3.5–5)
ALP SERPL-CCNC: 53 U/L (ref 34–104)
ALT SERPL W P-5'-P-CCNC: 17 U/L (ref 7–52)
ANION GAP SERPL CALCULATED.3IONS-SCNC: 4 MMOL/L (ref 4–13)
AST SERPL W P-5'-P-CCNC: 22 U/L (ref 13–39)
B BURGDOR IGG+IGM SER QL IA: NEGATIVE
BASOPHILS # BLD AUTO: 0.05 THOUSANDS/ÂΜL (ref 0–0.1)
BASOPHILS NFR BLD AUTO: 1 % (ref 0–1)
BILIRUB SERPL-MCNC: 0.37 MG/DL (ref 0.2–1)
BUN SERPL-MCNC: 9 MG/DL (ref 5–25)
CALCIUM SERPL-MCNC: 9.2 MG/DL (ref 8.4–10.2)
CHLORIDE SERPL-SCNC: 102 MMOL/L (ref 96–108)
CHOLEST SERPL-MCNC: 178 MG/DL
CO2 SERPL-SCNC: 30 MMOL/L (ref 21–32)
CREAT SERPL-MCNC: 0.61 MG/DL (ref 0.6–1.3)
EOSINOPHIL # BLD AUTO: 0.04 THOUSAND/ÂΜL (ref 0–0.61)
EOSINOPHIL NFR BLD AUTO: 1 % (ref 0–6)
ERYTHROCYTE [DISTWIDTH] IN BLOOD BY AUTOMATED COUNT: 12.3 % (ref 11.6–15.1)
FERRITIN SERPL-MCNC: 43 NG/ML (ref 11–307)
GFR SERPL CREATININE-BSD FRML MDRD: 110 ML/MIN/1.73SQ M
GLUCOSE SERPL-MCNC: 96 MG/DL (ref 65–140)
HCT VFR BLD AUTO: 36.8 % (ref 34.8–46.1)
HCV AB SER QL: NORMAL
HDLC SERPL-MCNC: 51 MG/DL
HGB BLD-MCNC: 12.1 G/DL (ref 11.5–15.4)
HIV 1+2 AB+HIV1 P24 AG SERPL QL IA: NORMAL
HIV 2 AB SERPL QL IA: NORMAL
HIV1 AB SERPL QL IA: NORMAL
HIV1 P24 AG SERPL QL IA: NORMAL
IMM GRANULOCYTES # BLD AUTO: 0.02 THOUSAND/UL (ref 0–0.2)
IMM GRANULOCYTES NFR BLD AUTO: 0 % (ref 0–2)
IRON SATN MFR SERPL: 38 % (ref 15–50)
IRON SERPL-MCNC: 111 UG/DL (ref 50–212)
L PNEUMO1 AG UR QL IA.RAPID: NEGATIVE
LDLC SERPL CALC-MCNC: 102 MG/DL (ref 0–100)
LYMPHOCYTES # BLD AUTO: 1.84 THOUSANDS/ÂΜL (ref 0.6–4.47)
LYMPHOCYTES NFR BLD AUTO: 30 % (ref 14–44)
MCH RBC QN AUTO: 30.5 PG (ref 26.8–34.3)
MCHC RBC AUTO-ENTMCNC: 32.9 G/DL (ref 31.4–37.4)
MCV RBC AUTO: 93 FL (ref 82–98)
MONOCYTES # BLD AUTO: 0.31 THOUSAND/ÂΜL (ref 0.17–1.22)
MONOCYTES NFR BLD AUTO: 5 % (ref 4–12)
NEUTROPHILS # BLD AUTO: 3.87 THOUSANDS/ÂΜL (ref 1.85–7.62)
NEUTS SEG NFR BLD AUTO: 63 % (ref 43–75)
NONHDLC SERPL-MCNC: 127 MG/DL
NRBC BLD AUTO-RTO: 0 /100 WBCS
PLATELET # BLD AUTO: 353 THOUSANDS/UL (ref 149–390)
PMV BLD AUTO: 9.1 FL (ref 8.9–12.7)
POTASSIUM SERPL-SCNC: 4.3 MMOL/L (ref 3.5–5.3)
PROT SERPL-MCNC: 7.5 G/DL (ref 6.4–8.4)
RBC # BLD AUTO: 3.97 MILLION/UL (ref 3.81–5.12)
S PYO AG THROAT QL: NEGATIVE
SODIUM SERPL-SCNC: 136 MMOL/L (ref 135–147)
T GONDII IGG SER QL IA: NEGATIVE
T GONDII IGM SERPL QL IA: NEGATIVE
TIBC SERPL-MCNC: 295 UG/DL (ref 250–450)
TREPONEMA PALLIDUM IGG+IGM AB [PRESENCE] IN SERUM OR PLASMA BY IMMUNOASSAY: NORMAL
TRIGL SERPL-MCNC: 123 MG/DL
TSH SERPL DL<=0.05 MIU/L-ACNC: 1.82 UIU/ML (ref 0.45–4.5)
UIBC SERPL-MCNC: 184 UG/DL (ref 155–355)
WBC # BLD AUTO: 6.13 THOUSAND/UL (ref 4.31–10.16)

## 2024-08-29 PROCEDURE — 87591 N.GONORRHOEAE DNA AMP PROB: CPT

## 2024-08-29 PROCEDURE — 85025 COMPLETE CBC W/AUTO DIFF WBC: CPT

## 2024-08-29 PROCEDURE — 87880 STREP A ASSAY W/OPTIC: CPT | Performed by: NURSE PRACTITIONER

## 2024-08-29 PROCEDURE — 82728 ASSAY OF FERRITIN: CPT

## 2024-08-29 PROCEDURE — 87449 NOS EACH ORGANISM AG IA: CPT

## 2024-08-29 PROCEDURE — 99213 OFFICE O/P EST LOW 20 MIN: CPT | Performed by: NURSE PRACTITIONER

## 2024-08-29 PROCEDURE — 87478 BORRELIA MIYAMOTOI AMP PRB: CPT

## 2024-08-29 PROCEDURE — 84443 ASSAY THYROID STIM HORMONE: CPT

## 2024-08-29 PROCEDURE — 87469 BABESIA MICROTI AMP PRB: CPT

## 2024-08-29 PROCEDURE — 80061 LIPID PANEL: CPT

## 2024-08-29 PROCEDURE — 87484 EHRLICHA CHAFFEENSIS AMP PRB: CPT

## 2024-08-29 PROCEDURE — 36415 COLL VENOUS BLD VENIPUNCTURE: CPT

## 2024-08-29 PROCEDURE — 87801 DETECT AGNT MULT DNA AMPLI: CPT

## 2024-08-29 PROCEDURE — 83550 IRON BINDING TEST: CPT

## 2024-08-29 PROCEDURE — 87389 HIV-1 AG W/HIV-1&-2 AB AG IA: CPT

## 2024-08-29 PROCEDURE — 71046 X-RAY EXAM CHEST 2 VIEWS: CPT

## 2024-08-29 PROCEDURE — 86778 TOXOPLASMA ANTIBODY IGM: CPT

## 2024-08-29 PROCEDURE — 87468 ANAPLSMA PHGCYTOPHLM AMP PRB: CPT

## 2024-08-29 PROCEDURE — 86803 HEPATITIS C AB TEST: CPT

## 2024-08-29 PROCEDURE — 83540 ASSAY OF IRON: CPT

## 2024-08-29 PROCEDURE — 86780 TREPONEMA PALLIDUM: CPT

## 2024-08-29 PROCEDURE — 86777 TOXOPLASMA ANTIBODY: CPT

## 2024-08-29 PROCEDURE — 86618 LYME DISEASE ANTIBODY: CPT

## 2024-08-29 PROCEDURE — 80053 COMPREHEN METABOLIC PANEL: CPT

## 2024-08-29 PROCEDURE — 87491 CHLMYD TRACH DNA AMP PROBE: CPT

## 2024-08-29 PROCEDURE — 87636 SARSCOV2 & INF A&B AMP PRB: CPT | Performed by: NURSE PRACTITIONER

## 2024-08-29 RX ORDER — ALBUTEROL SULFATE 90 UG/1
2 AEROSOL, METERED RESPIRATORY (INHALATION) EVERY 6 HOURS PRN
Qty: 8 G | Refills: 1 | Status: SHIPPED | OUTPATIENT
Start: 2024-08-29

## 2024-08-29 RX ORDER — PREDNISONE 20 MG/1
40 TABLET ORAL DAILY
Qty: 10 TABLET | Refills: 0 | Status: SHIPPED | OUTPATIENT
Start: 2024-08-29 | End: 2024-09-03

## 2024-08-29 NOTE — PROGRESS NOTES
Ambulatory Visit  Name: Kennedy Blake      : 1980      MRN: [id number]  Encounter Provider: FANI Milian  Encounter Date: 2024   Encounter department: [facility] [address] [place] [address]    Assessment & Plan   1. Wheezing  Comments:  Given prednisone burst and Ventolin inhaler as needed.  Orders:  -     predniSONE 20 mg tablet; Take 2 tablets (40 mg total) by mouth daily for 5 days  -     XR chest pa & lateral; Future; Expected date: 2024  -     TOXOPLASMOSIS ADULT PANEL (IGA SANDEEP/AC/HS/IGE); Future  -     TOXOPLASMOSIS ADULT PANEL (IGA SANDEEP/AC/HS/IGE)  -     POCT rapid ANTIGEN strepA  -     Covid/Flu- Lab Collect  2. Confusion  Comments:  Will obtain workup.  Orders:  -     TOXOPLASMOSIS ADULT PANEL (IGA SANDEEP/AC/HS/IGE); Future  -     TOXOPLASMOSIS ADULT PANEL (IGA SANDEEP/AC/HS/IGE)  -     Legionella antigen, urine; Future  -     Legionella antigen, urine  3. Lethargy  Comments:  Will obtain workup with blood work and urine.  Orders:  -     CBC and differential; Future  -     Lyme Total AB W Reflex to IGM/IGG; Future  -     Comprehensive metabolic panel; Future  -     TICK BORNE DISEASE, ACUTE MOLECULAR PANEL; Future  -     TOXOPLASMOSIS ADULT PANEL (IGA SANDEEP/AC/HS/IGE); Future  -     CBC and differential  -     Lyme Total AB W Reflex to IGM/IGG  -     Comprehensive metabolic panel  -     TICK BORNE DISEASE, ACUTE MOLECULAR PANEL  -     TOXOPLASMOSIS ADULT PANEL (IGA SANDEEP/AC/HS/IGE)  -     Legionella antigen, urine; Future  -     Legionella antigen, urine  -     POCT rapid ANTIGEN strepA  -     Covid/Flu- Lab Collect  4. Exercise-induced asthma  -     albuterol (Ventolin HFA) 90 mcg/act inhaler; Inhale 2 puffs every 6 (six) hours as needed for wheezing       History of Present Illness     Patient presents for sick visit.  Patient cannot speak as she has lost her voice.  This started last week, she was seen in urgent care.  Since then, she has had bouts of  diarrhea, lethargy and confusion.        Review of Systems   Constitutional:  Positive for appetite change, chills, diaphoresis and fatigue. Negative for fever.   HENT:  Positive for voice change. Negative for congestion, ear pain, sinus pressure, sinus pain and sore throat.    Eyes:  Negative for pain and visual disturbance.   Respiratory:  Positive for chest tightness. Negative for cough, shortness of breath and wheezing.    Cardiovascular:  Negative for chest pain and palpitations.   Gastrointestinal:  Positive for diarrhea and vomiting. Negative for abdominal pain and nausea.   Genitourinary:  Negative for dysuria and hematuria.   Musculoskeletal:  Negative for arthralgias and back pain.   Skin:  Negative for color change and rash.   Neurological:  Positive for light-headedness. Negative for seizures, syncope and headaches.   All other systems reviewed and are negative.    Current Outpatient Medications on File Prior to Visit   Medication Sig Dispense Refill    Azelastine HCl 137 MCG/SPRAY SOLN       ciclopirox (PENLAC) 8 % solution APPLY TO NAILS DAILY - REMOVE ON 7TH DAY WITH POLISH REMOVER      ferrous sulfate 324 (65 Fe) mg TAKE 1 TABLET BY MOUTH 2 TIMES A DAY BEFORE MEALS. 180 tablet 1    hydrocortisone (ANUSOL-HC) 2.5 % rectal cream Apply topically 2 (two) times a day 28 g 1    sertraline (ZOLOFT) 25 mg tablet Take 1 tablet (25 mg total) by mouth daily at bedtime 90 tablet 3    sertraline (ZOLOFT) 50 mg tablet TAKE 1 TABLET BY MOUTH EVERY DAY 90 tablet 1    [DISCONTINUED] albuterol (Ventolin HFA) 90 mcg/act inhaler Inhale 2 puffs every 6 (six) hours as needed for wheezing 8 g 1    valACYclovir (VALTREX) 1,000 mg tablet Take 2 tablets by mouth every 12 hours x 1 day (Patient not taking: Reported on 6/27/2024) 10 tablet 1    [DISCONTINUED] doxycycline (ADOXA) 100 MG tablet Take 100 mg by mouth daily (Patient not taking: Reported on 6/27/2024)      [DISCONTINUED] mupirocin (BACTROBAN) 2 % ointment Apply  "topically 3 (three) times a day (Patient not taking: Reported on 6/27/2024) 60 g 1    [DISCONTINUED] nitrofurantoin (MACROBID) 100 mg capsule Take 1 capsule (100 mg total) by mouth 2 (two) times a day for 5 days (Patient not taking: Reported on 8/26/2024) 10 capsule 0     No current facility-administered medications on file prior to visit.      Objective     /70 (BP Location: Left arm, Patient Position: Sitting, Cuff Size: Standard)   Pulse 66   Temp (!) 95 °F (35 °C)   Ht 5' 9\" (1.753 m)   Wt 80.3 kg (177 lb)   BMI 26.14 kg/m²     Physical Exam  Vitals and nursing note reviewed.   Constitutional:       General: She is not in acute distress.     Appearance: She is well-developed.   HENT:      Head: Normocephalic and atraumatic.      Right Ear: Tympanic membrane normal.      Left Ear: Tympanic membrane normal.      Nose: Congestion present.   Eyes:      Conjunctiva/sclera: Conjunctivae normal.   Cardiovascular:      Rate and Rhythm: Normal rate and regular rhythm.      Heart sounds: No murmur heard.  Pulmonary:      Effort: Pulmonary effort is normal. No respiratory distress.      Breath sounds: Normal breath sounds.   Abdominal:      Palpations: Abdomen is soft.      Tenderness: There is no abdominal tenderness.   Musculoskeletal:         General: No swelling.      Cervical back: Neck supple.   Skin:     General: Skin is warm and dry.      Capillary Refill: Capillary refill takes less than 2 seconds.   Neurological:      Mental Status: She is alert.   Psychiatric:         Mood and Affect: Mood normal.       Administrative Statements   I have spent a total time of 15 minutes in caring for this patient on the day of the visit/encounter including Documenting in the medical record, Reviewing / ordering tests, medicine, procedures  , and Obtaining or reviewing history  .        "

## 2024-08-30 ENCOUNTER — TELEPHONE (OUTPATIENT)
Age: 44
End: 2024-08-30

## 2024-08-30 LAB
C TRACH DNA SPEC QL NAA+PROBE: NEGATIVE
FLUAV RNA RESP QL NAA+PROBE: NEGATIVE
FLUBV RNA RESP QL NAA+PROBE: NEGATIVE
N GONORRHOEA DNA SPEC QL NAA+PROBE: NEGATIVE
SARS-COV-2 RNA RESP QL NAA+PROBE: NEGATIVE

## 2024-08-30 NOTE — TELEPHONE ENCOUNTER
"Last OV: 4/20/23   Hx: Functional diarrhea, iron deficiency anemia, rectal bleeding       Spoke with pts mother Bhumi, reports for over a week now pt has lost her voice. She reports she woke up one day and was gone. She went to a concert a couple weeks ago. She does have abdominal discomfort like \"its in a knot\" and vomited once on Wednesday. She went to see PCP yesterday and looking for labs- I called over to PCP office and they will call pt once resulted.     Pt also has fatigue and weakness- mom is concerned with sudden symptoms. I advised to continue to follow with PCP may be viral.     "

## 2024-08-30 NOTE — TELEPHONE ENCOUNTER
Pt's mom called requesting lab results. She was advised that they were not reviewed yet but office will reach out once the provider goes over them.

## 2024-08-30 NOTE — TELEPHONE ENCOUNTER
Patients GI provider:  TRISH Shabazz      Number to return call: [phone]    Reason for call: Pt's mother Bhumi (on consent) calling to book an ASAP appt for Pt who cannot currently speak. She is asking for an appt today or early next week. I offered my first available on 09.19.24 with Yaniv but it was declined. I asked if she could tell me the Pt's current symptoms and she texted to ask the Pt who replied that we should check her last appt notes as its a continuation of those issues along with Fatigue and nausea. Last appt was in 4.2023 and for Functional diarrhea,Iron deficiency anemia,Rectal bleeding, and Hematuria.  Pt and mother would like us to call back to Bhumi (number above) as she can speak for the Pt.

## 2024-09-03 LAB — MISCELLANEOUS LAB TEST RESULT: NORMAL

## 2024-09-06 ENCOUNTER — OFFICE VISIT (OUTPATIENT)
Dept: GASTROENTEROLOGY | Facility: CLINIC | Age: 44
End: 2024-09-06
Payer: COMMERCIAL

## 2024-09-06 VITALS
TEMPERATURE: 97.5 F | WEIGHT: 176.6 LBS | BODY MASS INDEX: 26.16 KG/M2 | HEART RATE: 66 BPM | SYSTOLIC BLOOD PRESSURE: 100 MMHG | DIASTOLIC BLOOD PRESSURE: 62 MMHG | OXYGEN SATURATION: 99 % | HEIGHT: 69 IN

## 2024-09-06 DIAGNOSIS — Z12.11 SCREENING FOR COLON CANCER: Primary | ICD-10-CM

## 2024-09-06 DIAGNOSIS — R19.7 DIARRHEA, UNSPECIFIED TYPE: ICD-10-CM

## 2024-09-06 DIAGNOSIS — Z83.719 FAMILY HISTORY OF COLONIC POLYPS: ICD-10-CM

## 2024-09-06 PROCEDURE — 99243 OFF/OP CNSLTJ NEW/EST LOW 30: CPT | Performed by: INTERNAL MEDICINE

## 2024-09-06 NOTE — PATIENT INSTRUCTIONS
Scheduled date of colonoscopy (as of today):11/21/24  Physician performing colonoscopy:Pilar  Location of colonoscopy:[facility]  Bowel prep reviewed with patient:Tea/Miralax  Instructions reviewed with patient by:Ulices dong  Clearances:  none

## 2024-09-06 NOTE — PROGRESS NOTES
[facility] - Outpatient Consultation  Kennedy Blake 43 y.o. female MRN: [id number]  Encounter: [id number]          ASSESSMENT AND PLAN:      1. Diarrhea, unspecified type  - Ambulatory Referral to Gastroenterology  -At the time of colonoscopy will perform biopsies    2. Screening for colon cancer  - Colonoscopy; Future    3. Family history of colonic polyps    ______________________________________________________________________    HPI: Kennedy is a 43-year-old female who comes the office today to schedule a screening colonoscopy.  There is a possible family history for colon polyps involving her mother.  She states that she will further inquire about this.  She denies any bloating, abdominal pain, nausea, vomiting, heartburn, rectal bleeding, hematemesis, melena.  She does admit to diarrhea on a daily basis.  This has been chronic diarrhea this been going on for years.  She denies constipation.  Her last colonoscopy performed on 6/20/2019 was reviewed.  She states that she had no problems with the previous colonoscopy regarding anesthesia, bowel prep, or the actual procedure.      REVIEW OF SYSTEMS:    CONSTITUTIONAL: Denies any fever, chills, rigors, and weight loss.  HEENT: No earache or tinnitus. Denies hearing loss or visual disturbances.  CARDIOVASCULAR: No chest pain or palpitations.   RESPIRATORY: Denies any cough, hemoptysis, shortness of breath or dyspnea on exertion.  GASTROINTESTINAL: As noted in the History of Present Illness.   GENITOURINARY: No problems with urination. Denies any hematuria or dysuria.  NEUROLOGIC: No dizziness or vertigo, denies headaches.   MUSCULOSKELETAL: Denies any muscle or joint pain.   SKIN: Denies skin rashes or itching.   ENDOCRINE: Denies excessive thirst. Denies intolerance to heat or cold.  PSYCHOSOCIAL: Denies depression or anxiety. Denies any recent memory loss.       Historical Information   Past Medical History:   Diagnosis Date     "BRCA1 negative 01/01/2008    BRCA2 negative 01/01/2008     Past Surgical History:   Procedure Laterality Date    AUGMENTATION BREAST Bilateral     AUGMENTATION MAMMAPLASTY Bilateral 01/01/2005     Social History   Social History     Substance and Sexual Activity   Alcohol Use Never     Social History     Substance and Sexual Activity   Drug Use Never     Social History     Tobacco Use   Smoking Status Never   Smokeless Tobacco Never     Family History   Problem Relation Age of Onset    Hypertension Mother     Diabetes Mother     Diabetes Father     Lymphoma Father     Breast cancer Maternal Grandmother 30    Coronary artery disease Maternal Grandfather     No Known Problems Paternal Grandmother     Skin cancer Maternal Aunt 66    No Known Problems Maternal Aunt     No Known Problems Maternal Aunt     No Known Problems Paternal Aunt        Meds/Allergies       Current Outpatient Medications:     albuterol (Ventolin HFA) 90 mcg/act inhaler    Azelastine HCl 137 MCG/SPRAY SOLN    ciclopirox (PENLAC) 8 % solution    ferrous sulfate 324 (65 Fe) mg    hydrocortisone (ANUSOL-HC) 2.5 % rectal cream    sertraline (ZOLOFT) 25 mg tablet    sertraline (ZOLOFT) 50 mg tablet    valACYclovir (VALTREX) 1,000 mg tablet    No Known Allergies        Objective     Blood pressure 100/62, pulse 66, temperature 97.5 °F (36.4 °C), temperature source Temporal, height 5' 9\" (1.753 m), weight 80.1 kg (176 lb 9.6 oz), SpO2 99%. Body mass index is 26.08 kg/m².        PHYSICAL EXAM:      General Appearance:   Alert, cooperative, no distress   HEENT:   Normocephalic, atraumatic, anicteric.     Neck:  Supple, symmetrical, trachea midline   Lungs:   Clear to auscultation bilaterally; no rales, rhonchi or wheezing; respirations unlabored    Heart::   Regular rate and rhythm; no murmur, rub, or gallop.   Abdomen:   Soft, non-tender, non-distended; normal bowel sounds; no masses, no organomegaly    Genitalia:   Deferred    Rectal:   Deferred  "   Extremities:  No cyanosis, clubbing or edema    Pulses:  2+ and symmetric    Skin:  No jaundice, rashes, or lesions    Lymph nodes:  No palpable cervical lymphadenopathy        Lab Results:   No visits with results within 1 Day(s) from this visit.   Latest known visit with results is:   Appointment on 08/29/2024   Component Date Value    Hepatitis C Ab 08/29/2024 Non-reactive     HIV-1 p24 Antigen 08/29/2024 Non-Reactive     HIV-1 Antibody 08/29/2024 Non-Reactive     HIV-2 Antibody 08/29/2024 Non-Reactive     HIV Ag-Ab 5th Gen 08/29/2024 Non-Reactive     Syphilis Total Antibody 08/29/2024 Non-reactive     Cholesterol 08/29/2024 178     Triglycerides 08/29/2024 123     HDL, Direct 08/29/2024 51     LDL Calculated 08/29/2024 102 (H)     Non-HDL-Chol (CHOL-HDL) 08/29/2024 127     Sodium 08/29/2024 136     Potassium 08/29/2024 4.3     Chloride 08/29/2024 102     CO2 08/29/2024 30     ANION GAP 08/29/2024 4     BUN 08/29/2024 9     Creatinine 08/29/2024 0.61     Glucose 08/29/2024 96     Calcium 08/29/2024 9.2     AST 08/29/2024 22     ALT 08/29/2024 17     Alkaline Phosphatase 08/29/2024 53     Total Protein 08/29/2024 7.5     Albumin 08/29/2024 4.3     Total Bilirubin 08/29/2024 0.37     eGFR 08/29/2024 110     TSH 3RD GENERATON 08/29/2024 1.819     WBC 08/29/2024 6.13     RBC 08/29/2024 3.97     Hemoglobin 08/29/2024 12.1     Hematocrit 08/29/2024 36.8     MCV 08/29/2024 93     MCH 08/29/2024 30.5     MCHC 08/29/2024 32.9     RDW 08/29/2024 12.3     MPV 08/29/2024 9.1     Platelets 08/29/2024 353     nRBC 08/29/2024 0     Segmented % 08/29/2024 63     Immature Grans % 08/29/2024 0     Lymphocytes % 08/29/2024 30     Monocytes % 08/29/2024 5     Eosinophils Relative 08/29/2024 1     Basophils Relative 08/29/2024 1     Absolute Neutrophils 08/29/2024 3.87     Absolute Immature Grans 08/29/2024 0.02     Absolute Lymphocytes 08/29/2024 1.84     Absolute Monocytes 08/29/2024 0.31     Eosinophils Absolute 08/29/2024 0.04      Basophils Absolute 08/29/2024 0.05     Miscellaneous Lab Test R* 08/29/2024 Tick-borne Disease, Acute Molecular Panel     Legionella Urinary Antig* 08/29/2024 Negative     N gonorrhoeae, DNA Probe 08/29/2024 Negative     Chlamydia trachomatis, D* 08/29/2024 Negative     Lyme Total Antibodies 08/29/2024 Negative     Iron Saturation 08/29/2024 38     TIBC 08/29/2024 295     Iron 08/29/2024 111     UIBC 08/29/2024 184     Ferritin 08/29/2024 43     Toxoplasma IGG 08/29/2024 Negative     Toxoplasma IGM 08/29/2024 Negative          Radiology Results:   XR chest pa & lateral    Result Date: 8/29/2024  Narrative: XR CHEST PA AND LATERAL INDICATION: R06.2: Wheezing. COMPARISON: None FINDINGS: Breast implants are noted. Clear lungs. No pneumothorax or pleural effusion. Normal cardiomediastinal silhouette. Bones are unremarkable for age. Normal upper abdomen.     Impression: No acute cardiopulmonary disease. Workstation performed: [id number]     Mammo screening bilateral w 3d & cad    Result Date: 8/27/2024  Narrative: DIAGNOSIS: Encounter for screening mammogram for malignant neoplasm of breast TECHNIQUE: Digital screening mammography was performed. Computer Aided Detection (CAD) analyzed all applicable images. COMPARISONS: Prior breast imaging dated: 07/26/2023, 04/28/2022, 04/28/2022, and 04/01/2021 RELEVANT HISTORY: Family Breast Cancer History: History of breast cancer in Maternal Grandmother. Family Medical History: Family medical history includes breast cancer in maternal grandmother. Personal History: Hormone history includes birth control. Surgical history includes breast enhancement. Medical history includes BRCA 1 negative and BRCA 2 negative. The patient is scheduled in a reminder system for screening mammography. 8-10% of cancers will be missed on mammography. Management of a palpable abnormality must be based on clinical grounds.  Patients will be notified of their results via letter from our facility.  Accredited by [employer] and FDA. RISK ASSESSMENT: 5 Year Tyrer-Cuzick: 2.17% 10 Year Tyrer-Cuzick: 5.06% Lifetime Tyrer-Cuzick: 27.32% TISSUE DENSITY: The breasts are extremely dense, which lowers the sensitivity of mammography. INDICATION: Kennedy Blake is a 43 y.o. female presenting for screening mammography. FINDINGS: Bilateral There are no suspicious masses, grouped microcalcifications or areas of unexplained architectural distortion. The skin and nipple areolar complex are unremarkable.  Bilateral retropectoral saline implants are noted.  A few diffusely distributed calcifications are noted in each breast.     Impression: No mammographic evidence of malignancy. This patient has been identified as being at elevated risk for development of breast cancer based on the Tyrer-Cuzick model. She may benefit from supplemental screening. ASSESSMENT/BI-RADS CATEGORY: Left: 2 - Benign Right: 2 - Benign Overall: 2 - Benign RECOMMENDATION:      - Routine screening mammogram in 1 year for both breasts. Workstation ID: [id number]     Answers submitted by the patient for this visit:  Abdominal Pain Questionnaire (Submitted on 9/6/2024)  Chief Complaint: Abdominal pain  Chronicity: recurrent  Onset: 1 to 4 weeks ago  Onset quality: sudden  Frequency: intermittently  Episode duration: 2 Days  Progression since onset: gradually improving  Pain location: LLQ, suprapubic region  Pain - numeric: 2/10  Pain quality: cramping, sharp  Radiates to: does not radiate  anorexia: No  arthralgias: No  belching: No  constipation: No  diarrhea: Yes  dysuria: No  fever: No  flatus: No  frequency: No  headaches: No  hematochezia: No  hematuria: Yes  melena: Yes  myalgias: No  nausea: No  weight loss: No  vomiting: No  Aggravated by: movement  Relieved by: certain positions

## 2024-10-08 ENCOUNTER — EVALUATION (OUTPATIENT)
Dept: PHYSICAL THERAPY | Facility: CLINIC | Age: 44
End: 2024-10-08
Payer: COMMERCIAL

## 2024-10-08 DIAGNOSIS — R29.898 RIGHT ARM WEAKNESS: Primary | ICD-10-CM

## 2024-10-08 PROCEDURE — 97140 MANUAL THERAPY 1/> REGIONS: CPT | Performed by: PHYSICAL THERAPIST

## 2024-10-08 PROCEDURE — 97110 THERAPEUTIC EXERCISES: CPT | Performed by: PHYSICAL THERAPIST

## 2024-10-08 NOTE — PROGRESS NOTES
PT Re-evaluation     Today's date: 10/8/2024  Patient name: Kennedy Blake  : 1980  MRN: [id number]  Referring provider: Sree Chavira MD  Dx:   Encounter Diagnosis     ICD-10-CM    1. Right arm weakness  R29.898                      Assessment  Impairments: abnormal or restricted ROM, activity intolerance, impaired physical strength and pain with function    Assessment details: Pt is a 42 y/o female who presents to physical therapy with primary nociceptive pain associated with R lateral epicondylalgia in the environment of L shld pain complicated by work requirements utilizing a computer, as well as yoga routine requirements. Pt does not present with any red flag symptoms at this time. Pt presents with lateral epicondylalgia due to pain with stretch and activation of the common extensor tendon. Significant improvement noted with mobilization with movement again today. Even further increased with HEP. Education provided regarding POC, prognosis and HEP, pt verablized understanding. Pt would benefit from skilled physical therapy in order to decrease deficits and return to prior level of function.    Understanding of Dx/Px/POC: good    Goals  STG (4 weeks):  Pt will be independent with HEP.  Pt will demonstrate painfree  to >60#.   Pt will demonstrate plank with <3/10 pain.     LTG (8 weeks):  FOTO will be expected outcome.   Pt will demonstrate painfree  >65#.   Pt will demonstrate no pain with plank.       Plan  Patient would benefit from: skilled physical therapy  Planned modality interventions: cryotherapy    Planned therapy interventions: manual therapy, neuromuscular re-education, patient education, self care, strengthening, stretching, therapeutic activities, therapeutic exercise and home exercise program    Frequency: 1-2x/week.  Duration in weeks: 8  Treatment plan discussed with: patient    Subjective Evaluation    History of Present Illness  Mechanism of injury: Chief Complaint:  Pt reports that elbow pain began after waking from a sleep where she was lying on her R side with her elbow bent and tucked under her head. She states that after this she began noting a sharp pain in the posterior forearm, originally it was fairly constant, hurting with turning a doorknob and computer work. She states that now it is improved, however, she still has pain with computer work, bearing weight in a plank position, and gripping tasks. She did have an EMG a few weeks ago that was negative.     (10/8): Pt hasn't been seen since August due to scheduling conflict. She has not been able to do any HEP.     Severity: moderate to severe  Irritability: moderate  Nature: nociceptive  Stage: chronic  Stability: improving    P1: see body chart  Patient Goals  Patient goals for therapy: decreased pain and increased strength  Patient goal: be able to do a plank position without pain    Objective     Observations     Additional Observation Details  Reduced cervical lordosis, no apparent neck shift    Neurological Testing     Additional Neurological Details  (-) Radial neurodynamic test    Active Range of Motion     Additional Active Range of Motion Details  WNL in all directions, however, pain with full elbow flexion    Wrist: WNL in all directions to except pain with wrist ext    Passive Range of Motion     Additional Passive Range of Motion Details  Pain with wrist flexion, all other elbow and wrist passive motions WNL and non-painful    Strength/Myotome Testing     Additional Strength Details  :  L: 55.7# pain free  R: 31# pain free, 59.0# max with pain    Tests     Additional Tests Details  (+) Mill's             Precautions: None    POC expires Unit limit Auth Expiration date PT/OT/ST + Visit Limit?   10/16/24 BOMN pending BOMN                           Visit/Unit Tracking  AUTH Status:  Date 8/21               Used 1               Remaining                         Plank, Pain free             Manuals 8/21  10/8           MWM lat glide elbow BRENDA 2x10 BRENDA 3x10                                                  Neuro Re-Ed                                                                                                        Ther Ex             Lat glide MWM HEP BRENDA 2x10           Wtd Wrist extension                                                                              Pt edu BRENDA            Ther Activity                                       Gait Training                                       Modalities

## 2024-10-16 ENCOUNTER — APPOINTMENT (OUTPATIENT)
Dept: PHYSICAL THERAPY | Facility: CLINIC | Age: 44
End: 2024-10-16
Payer: COMMERCIAL

## 2024-10-18 ENCOUNTER — OFFICE VISIT (OUTPATIENT)
Dept: PHYSICAL THERAPY | Facility: CLINIC | Age: 44
End: 2024-10-18
Payer: COMMERCIAL

## 2024-10-18 DIAGNOSIS — R29.898 RIGHT ARM WEAKNESS: Primary | ICD-10-CM

## 2024-10-18 PROCEDURE — 97110 THERAPEUTIC EXERCISES: CPT | Performed by: PHYSICAL THERAPIST

## 2024-10-18 NOTE — PROGRESS NOTES
PT Discharge     Today's date: 10/18/2024  Patient name: Kennedy Blake  : 1980  MRN: [id number]  Referring provider: Sree Chavira MD  Dx:   Encounter Diagnosis     ICD-10-CM    1. Right arm weakness  R29.898                      Assessment  Impairments: abnormal or restricted ROM, activity intolerance, impaired physical strength and pain with function    Assessment details: Pt is a 42 y/o female who presents to physical therapy with primary nociceptive pain associated with R lateral epicondylalgia in the environment of L shld pain complicated by work requirements utilizing a computer, as well as yoga routine requirements. Pt no longer has any signs or symptoms in the R elbow. Plank is painfree. Discussed slowly returning to yoga. Skilled PT is no longer indicated, PT and pt discussed d/c, pt agreeable.     Understanding of Dx/Px/POC: good    Goals  STG (4 weeks): - MET  Pt will be independent with HEP.  Pt will demonstrate painfree  to >60#.   Pt will demonstrate plank with <3/10 pain.     LTG (8 weeks): - MET   FOTO will be expected outcome.   Pt will demonstrate painfree  >65#.   Pt will demonstrate no pain with plank.       Plan: d/c    Subjective Evaluation    History of Present Illness  Mechanism of injury: Chief Complaint: Pt reports that elbow pain began after waking from a sleep where she was lying on her R side with her elbow bent and tucked under her head. She states that after this she began noting a sharp pain in the posterior forearm, originally it was fairly constant, hurting with turning a doorknob and computer work. She states that now it is improved, however, she still has pain with computer work, bearing weight in a plank position, and gripping tasks. She did have an EMG a few weeks ago that was negative.     (10/8): Pt hasn't been seen since August due to scheduling conflict. She has not been able to do any HEP.     (10/18): Pt reports that she is ~50% improved. She is  not having any elbow pain during her daily activities, however, she has not returned back to her yoga practice due to her fear of increasing her pain. Has been completing HEP as prescribed.     Severity: moderate to severe  Irritability: moderate  Nature: nociceptive  Stage: chronic  Stability: improving    P1: see body chart  Patient Goals  Patient goals for therapy: decreased pain and increased strength  Patient goal: be able to do a plank position without pain    Objective     Observations     Additional Observation Details  Reduced cervical lordosis, no apparent neck shift    Neurological Testing     Additional Neurological Details  (-) Radial neurodynamic test    Active Range of Motion     Additional Active Range of Motion Details  WNL in all directions, however, pain with full elbow flexion    Wrist: WNL in all directions to except pain with wrist ext    Passive Range of Motion     Additional Passive Range of Motion Details  Pain with wrist flexion, all other elbow and wrist passive motions WNL and non-painful    Strength/Myotome Testing     Additional Strength Details  :  L: 55.7# pain free; (10/18): 68.1# painfree  R: 31# pain free, 59.0# max with pain; (10/18): 68.5# painfree    Tests     Additional Tests Details  (-) Mill's  (-) Cozen's     Precautions: None    POC expires Unit limit Auth Expiration date PT/OT/ST + Visit Limit?   10/16/24 BOMN pending BOMN                           Visit/Unit Tracking  AUTH Status:  Date 8/21               Used 1               Remaining                         Plank, Pain free             Manuals 8/21 10/8           MWM lat glide elbow BRENDA 2x10 BRENDA 3x10                                                  Neuro Re-Ed                                                                                                        Ther Ex             Lat glide MWM HEP BRENDA 2x10           Wtd Wrist extension                                                                              Pt  edu BRENDA            Ther Activity                                       Gait Training                                       Modalities

## 2024-10-21 ENCOUNTER — APPOINTMENT (OUTPATIENT)
Dept: PHYSICAL THERAPY | Facility: CLINIC | Age: 44
End: 2024-10-21
Payer: COMMERCIAL

## 2024-10-23 ENCOUNTER — OFFICE VISIT (OUTPATIENT)
Dept: FAMILY MEDICINE CLINIC | Facility: CLINIC | Age: 44
End: 2024-10-23
Payer: COMMERCIAL

## 2024-10-23 VITALS
OXYGEN SATURATION: 99 % | DIASTOLIC BLOOD PRESSURE: 60 MMHG | HEIGHT: 69 IN | HEART RATE: 49 BPM | BODY MASS INDEX: 27.08 KG/M2 | SYSTOLIC BLOOD PRESSURE: 110 MMHG | WEIGHT: 182.8 LBS

## 2024-10-23 DIAGNOSIS — Z12.4 SCREENING FOR CERVICAL CANCER: ICD-10-CM

## 2024-10-23 DIAGNOSIS — N95.1 PERIMENOPAUSAL VASOMOTOR SYMPTOMS: ICD-10-CM

## 2024-10-23 DIAGNOSIS — N91.2 AMENORRHEA: Primary | ICD-10-CM

## 2024-10-23 LAB — SL AMB POCT URINE HCG: NORMAL

## 2024-10-23 PROCEDURE — 99214 OFFICE O/P EST MOD 30 MIN: CPT | Performed by: NURSE PRACTITIONER

## 2024-10-23 PROCEDURE — 81025 URINE PREGNANCY TEST: CPT | Performed by: NURSE PRACTITIONER

## 2024-10-23 NOTE — PROGRESS NOTES
Ambulatory Visit  Name: Kennedy Blake      : 1980      MRN: [id number]  Encounter Provider: FANI Hall  Encounter Date: 10/23/2024   Encounter department: [facility] [address]    Assessment & Plan  Amenorrhea  Urine hCG negative in office.  Advised to obtain blood work as ordered.  Referral for OB/GYN reentered for patient.    Orders:    CBC and differential; Future    Comprehensive metabolic panel; Future    Estradiol; Future    Follicle stimulating hormone; Future    TSH, 3rd generation with Free T4 reflex; Future    Prolactin; Future    Pregnancy Test (HCG Qualitative); Future    POCT urine HCG    Cortisol; Future    Perimenopausal vasomotor symptoms  Discussed with patient use of black cohosh, primrose oil, berberine with food.  Referral given to OB/GYN/hormone specialist.    Orders:    Cortisol; Future    Ambulatory referral to Obstetrics / Gynecology; Future    Screening for cervical cancer    Orders:    Ambulatory Referral to Obstetrics / Gynecology; Future      Depression Screening and Follow-up Plan: Patient's depression screening was positive with a PHQ-9 score of 17. Patient with underlying depression and was advised to continue current medications as prescribed.       History of Present Illness     Patient presents to the office for evaluation of missed period.  LNMP 2024.  Is currently sexually active, not using protection.  Has been experiencing over the past few months perimenopausal symptoms such as mood fluctuations, hot flashes, irritability.  Patient denies abnormal vaginal bleeding or discharge.  Denies urinary symptoms, fever, chills.  Denies breast tenderness, nausea.          History obtained from : patient  Review of Systems   Constitutional:  Negative for activity change, appetite change and fatigue.   HENT:  Negative for sore throat and trouble swallowing.    Eyes:  Negative for photophobia and visual disturbance.    Respiratory:  Negative for cough and shortness of breath.    Cardiovascular:  Negative for chest pain and palpitations.   Gastrointestinal:  Negative for abdominal pain, nausea and vomiting.   Genitourinary:  Positive for menstrual problem. Negative for decreased urine volume, dysuria, flank pain, pelvic pain, vaginal bleeding and vaginal discharge.   Musculoskeletal:  Negative for arthralgias and myalgias.   Skin:  Negative for color change and rash.   Neurological:  Negative for dizziness, weakness, light-headedness and headaches.   Psychiatric/Behavioral:  Positive for agitation. Negative for dysphoric mood and suicidal ideas. The patient is not nervous/anxious.      Pertinent Medical History     Current Outpatient Medications on File Prior to Visit   Medication Sig Dispense Refill    albuterol (Ventolin HFA) 90 mcg/act inhaler Inhale 2 puffs every 6 (six) hours as needed for wheezing 8 g 1    Azelastine HCl 137 MCG/SPRAY SOLN       ciclopirox (PENLAC) 8 % solution APPLY TO NAILS DAILY - REMOVE ON 7TH DAY WITH POLISH REMOVER      ferrous sulfate 324 (65 Fe) mg TAKE 1 TABLET BY MOUTH 2 TIMES A DAY BEFORE MEALS. 180 tablet 1    hydrocortisone (ANUSOL-HC) 2.5 % rectal cream Apply topically 2 (two) times a day 28 g 1    mupirocin (BACTROBAN) 2 % ointment Apply topically 3 (three) times a day 60 g 1    sertraline (ZOLOFT) 25 mg tablet Take 1 tablet (25 mg total) by mouth daily at bedtime 90 tablet 3    sertraline (ZOLOFT) 50 mg tablet Take 1 tablet (50 mg total) by mouth daily 90 tablet 2    valACYclovir (VALTREX) 1,000 mg tablet Take 2 tablets by mouth every 12 hours x 1 day 10 tablet 1    [DISCONTINUED] metoprolol tartrate (LOPRESSOR) 100 mg tablet Take 1 tablet (100 mg total) by mouth once for 1 dose (Patient not taking: Reported on 3/6/2024) 1 tablet 0    [DISCONTINUED] promethazine-dextromethorphan (PHENERGAN-DM) 6.25-15 mg/5 mL oral syrup Take 5 mL by mouth 4 (four) times a day as needed for cough (Patient not  taking: Reported on 4/17/2024) 118 mL 0     No current facility-administered medications on file prior to visit.          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     Past Surgical History:   Procedure Laterality Date    AUGMENTATION BREAST Bilateral     AUGMENTATION MAMMAPLASTY Bilateral 01/01/2005     Family History   Problem Relation Age of Onset    Hypertension Mother     Diabetes Mother     Diabetes Father     Lymphoma Father     Breast cancer Maternal Grandmother 30    Coronary artery disease Maternal Grandfather     No Known Problems Paternal Grandmother     Skin cancer Maternal Aunt 66    No Known Problems Maternal Aunt     No Known Problems Maternal Aunt     No Known Problems Paternal Aunt      Current Outpatient Medications on File Prior to Visit   Medication Sig Dispense Refill    albuterol (Ventolin HFA) 90 mcg/act inhaler Inhale 2 puffs every 6 (six) hours as needed for wheezing 8 g 1    Azelastine HCl 137 MCG/SPRAY SOLN       ciclopirox (PENLAC) 8 % solution APPLY TO NAILS DAILY - REMOVE ON 7TH DAY WITH POLISH REMOVER      ferrous sulfate 324 (65 Fe) mg TAKE 1 TABLET BY MOUTH 2 TIMES A DAY BEFORE MEALS. 180 tablet 1    hydrocortisone (ANUSOL-HC) 2.5 % rectal cream Apply topically 2 (two) times a day 28 g 1    sertraline (ZOLOFT) 25 mg tablet Take 1 tablet (25 mg total) by mouth daily at bedtime 90 tablet 3    sertraline (ZOLOFT) 50 mg tablet TAKE 1 TABLET BY MOUTH EVERY DAY 90 tablet 1    valACYclovir (VALTREX) 1,000 mg tablet Take 2 tablets by mouth every 12 hours x 1 day (Patient not taking: Reported on 6/27/2024) 10 tablet 1     No current facility-administered medications on file prior to visit.   No Known Allergies   Current Outpatient Medications on File Prior to Visit   Medication Sig Dispense  "Refill    albuterol (Ventolin HFA) 90 mcg/act inhaler Inhale 2 puffs every 6 (six) hours as needed for wheezing 8 g 1    Azelastine HCl 137 MCG/SPRAY SOLN       ciclopirox (PENLAC) 8 % solution APPLY TO NAILS DAILY - REMOVE ON 7TH DAY WITH POLISH REMOVER      ferrous sulfate 324 (65 Fe) mg TAKE 1 TABLET BY MOUTH 2 TIMES A DAY BEFORE MEALS. 180 tablet 1    hydrocortisone (ANUSOL-HC) 2.5 % rectal cream Apply topically 2 (two) times a day 28 g 1    sertraline (ZOLOFT) 25 mg tablet Take 1 tablet (25 mg total) by mouth daily at bedtime 90 tablet 3    sertraline (ZOLOFT) 50 mg tablet TAKE 1 TABLET BY MOUTH EVERY DAY 90 tablet 1    valACYclovir (VALTREX) 1,000 mg tablet Take 2 tablets by mouth every 12 hours x 1 day (Patient not taking: Reported on 6/27/2024) 10 tablet 1     No current facility-administered medications on file prior to visit.      Social History     Tobacco Use    Smoking status: Never    Smokeless tobacco: Never   Vaping Use    Vaping status: Never Used   Substance and Sexual Activity    Alcohol use: Never    Drug use: Never    Sexual activity: Not on file         Objective     /60 (BP Location: Left arm, Patient Position: Sitting, Cuff Size: Standard)   Pulse (!) 49   Ht 5' 9\" (1.753 m)   Wt 82.9 kg (182 lb 12.8 oz)   LMP 09/11/2024 (Exact Date)   SpO2 99%   BMI 26.99 kg/m²     Physical Exam  Vitals reviewed.   Constitutional:       General: She is not in acute distress.     Appearance: Normal appearance. She is not ill-appearing.   HENT:      Head: Normocephalic and atraumatic.      Right Ear: Tympanic membrane, ear canal and external ear normal.      Left Ear: Tympanic membrane, ear canal and external ear normal.      Nose: Nose normal.      Mouth/Throat:      Mouth: Mucous membranes are moist.      Pharynx: Oropharynx is clear.   Eyes:      Conjunctiva/sclera: Conjunctivae normal.      Pupils: Pupils are equal, round, and reactive to light.   Cardiovascular:      Rate and Rhythm: Normal " rate and regular rhythm.      Pulses: Normal pulses.      Heart sounds: Normal heart sounds. No murmur heard.  Pulmonary:      Effort: Pulmonary effort is normal.      Breath sounds: Normal breath sounds.   Musculoskeletal:         General: Normal range of motion.      Cervical back: Normal range of motion and neck supple.   Lymphadenopathy:      Cervical: No cervical adenopathy.   Skin:     General: Skin is warm and dry.   Neurological:      General: No focal deficit present.      Mental Status: She is alert and oriented to person, place, and time.   Psychiatric:         Attention and Perception: Attention and perception normal.         Mood and Affect: Mood normal.         Speech: Speech normal.         Behavior: Behavior normal. Behavior is cooperative.         Thought Content: Thought content normal.

## 2024-10-31 ENCOUNTER — APPOINTMENT (OUTPATIENT)
Dept: PHYSICAL THERAPY | Facility: CLINIC | Age: 44
End: 2024-10-31
Payer: COMMERCIAL

## 2024-11-12 ENCOUNTER — TELEPHONE (OUTPATIENT)
Age: 44
End: 2024-11-12

## 2024-11-23 DIAGNOSIS — D64.9 ANEMIA, UNSPECIFIED TYPE: ICD-10-CM

## 2024-11-25 RX ORDER — FERROUS SULFATE 324(65)MG
324 TABLET, DELAYED RELEASE (ENTERIC COATED) ORAL
Qty: 180 TABLET | Refills: 1 | Status: SHIPPED | OUTPATIENT
Start: 2024-11-25

## 2024-12-05 ENCOUNTER — OFFICE VISIT (OUTPATIENT)
Dept: OBGYN CLINIC | Facility: OTHER | Age: 44
End: 2024-12-05
Payer: COMMERCIAL

## 2024-12-05 VITALS
DIASTOLIC BLOOD PRESSURE: 66 MMHG | HEART RATE: 64 BPM | BODY MASS INDEX: 26.66 KG/M2 | WEIGHT: 180 LBS | SYSTOLIC BLOOD PRESSURE: 99 MMHG | HEIGHT: 69 IN

## 2024-12-05 DIAGNOSIS — M75.42 SUBACROMIAL IMPINGEMENT OF LEFT SHOULDER: Primary | ICD-10-CM

## 2024-12-05 PROCEDURE — 99213 OFFICE O/P EST LOW 20 MIN: CPT | Performed by: ORTHOPAEDIC SURGERY

## 2024-12-05 PROCEDURE — 20610 DRAIN/INJ JOINT/BURSA W/O US: CPT | Performed by: ORTHOPAEDIC SURGERY

## 2024-12-05 RX ORDER — BUPIVACAINE HYDROCHLORIDE 2.5 MG/ML
2 INJECTION, SOLUTION INFILTRATION; PERINEURAL
Status: COMPLETED | OUTPATIENT
Start: 2024-12-05 | End: 2024-12-05

## 2024-12-05 RX ORDER — BETAMETHASONE SODIUM PHOSPHATE AND BETAMETHASONE ACETATE 3; 3 MG/ML; MG/ML
6 INJECTION, SUSPENSION INTRA-ARTICULAR; INTRALESIONAL; INTRAMUSCULAR; SOFT TISSUE
Status: COMPLETED | OUTPATIENT
Start: 2024-12-05 | End: 2024-12-05

## 2024-12-05 RX ADMIN — BETAMETHASONE SODIUM PHOSPHATE AND BETAMETHASONE ACETATE 6 MG: 3; 3 INJECTION, SUSPENSION INTRA-ARTICULAR; INTRALESIONAL; INTRAMUSCULAR; SOFT TISSUE at 13:45

## 2024-12-05 RX ADMIN — BUPIVACAINE HYDROCHLORIDE 2 ML: 2.5 INJECTION, SOLUTION INFILTRATION; PERINEURAL at 13:45

## 2024-12-05 NOTE — PROGRESS NOTES
"  Assessment  Diagnoses and all orders for this visit:    Subacromial impingement of left shoulder    Discussion and Plan:    The patient continues to have an examination consistent with subacromial impingement syndrome of the left shoulder.  I have discussed with the patient the pathophysiology of this diagnosis and reviewed how the examination correlates with this diagnosis.  Treatment options were discussed at length and after discussing these treatment options, the patient elected for and received a subacromial injection of corticosteroid (as described in the procedure note) with a prescription for referral to physical therapy.    We will reevaluate the patient on an as needed basis.  If the symptoms fail to improve with this treatment the patient would be indicated for further imaging in the form of an MRI scan of the shoulder.  Patient had an open, stand-up MRI of the left shoulder in August 2023 so would be reasonable to update this with a more accurate magnet, she does states she has claustrophobia and does not want to go into a \"tube \"so we did discuss the use of one of the larger array MRI scanners at the [facility] or at [place] if necessary and/or consideration for CT arthrogram if she is unable to tolerate the MRI.    Subjective:   Patient ID: Kennedy Blake is a 44 y.o. female presents today for a follow up visit for her left shoulder. She has treated in the past for subacromial impingement with an injection and PT back in March and May with benefit. Today, the patient reports that her symptoms have been increasing about the shoulder over the past few months without any acute injury or trauma. Pain is worse with overhead and repetitive motions. Patient reports that her pain radiates to her trapezius and into her neck. No numbness or tingling. No fevers or chills.     The following portions of the patient's history were reviewed and updated as appropriate: allergies, current medications, past " "family history, past medical history, past social history, past surgical history and problem list.    Objective:  BP 99/66   Pulse 64   Ht 5' 9\" (1.753 m)   Wt 81.6 kg (180 lb)   BMI 26.58 kg/m²       Left Shoulder Exam     Range of Motion   The patient has normal left shoulder ROM.    Muscle Strength   Abduction: 4/5   External rotation: 5/5     Tests   Martínez test: positive  Impingement: positive  Drop arm: negative    Other   Erythema: absent  Sensation: normal  Pulse: present             Physical Exam  Constitutional:       Appearance: She is well-developed.   Eyes:      Pupils: Pupils are equal, round, and reactive to light.   Pulmonary:      Effort: Pulmonary effort is normal.      Breath sounds: Normal breath sounds.   Skin:     General: Skin is warm and dry.   Neurological:      Mental Status: She is alert and oriented to person, place, and time.   Psychiatric:         Behavior: Behavior normal.         Thought Content: Thought content normal.         Judgment: Judgment normal.         Large joint arthrocentesis: L subacromial bursa  [place] Protocol:  procedure performed by consultantConsent: Verbal consent obtained.  Risks and benefits: risks, benefits and alternatives were discussed  Consent given by: patient  Time out: Immediately prior to procedure a \"time out\" was called to verify the correct patient, procedure, equipment, support staff and site/side marked as required.  Site marked: the operative site was marked  Radiology Images displayed and confirmed. If images not available, report reviewed: imaging studies available  Patient identity confirmed: verbally with patient  Supporting Documentation  Indications: pain and diagnostic evaluation   Procedure Details  Location: shoulder - L subacromial bursa  Preparation: Patient was prepped and draped in the usual sterile fashion  Needle size: 22 G  Ultrasound guidance: no  Approach: lateral  Medications administered: 2 mL bupivacaine 0.25 %; 6 mg " betamethasone acetate-betamethasone sodium phosphate 6 (3-3) mg/mL    Patient tolerance: patient tolerated the procedure well with no immediate complications  Dressing:  Sterile dressing applied            I have personally reviewed pertinent films in PACS and my interpretation is as follows.    MRI Left Shoulder 8/8/23: Supraspinatus tendinosis without rotator cuff tear     X Ray Left Shoulder 3/4/24: No acute osseous abnormalities or degenerative changes      Records Reviewed: office notes from her visit with us on 3/4/24 and 5/20/24    Scribe Attestation      I,:  Oscar Davis am acting as a scribe while in the presence of the attending physician.:       I,:  Neftali Velasquez MD personally performed the services described in this documentation    as scribed in my presence.:

## 2024-12-09 ENCOUNTER — OFFICE VISIT (OUTPATIENT)
Age: 44
End: 2024-12-09
Payer: COMMERCIAL

## 2024-12-09 VITALS
HEIGHT: 69 IN | DIASTOLIC BLOOD PRESSURE: 82 MMHG | WEIGHT: 182 LBS | BODY MASS INDEX: 26.96 KG/M2 | SYSTOLIC BLOOD PRESSURE: 118 MMHG

## 2024-12-09 DIAGNOSIS — N89.8 VAGINAL DRYNESS: Primary | ICD-10-CM

## 2024-12-09 DIAGNOSIS — N39.3 SUI (STRESS URINARY INCONTINENCE, FEMALE): ICD-10-CM

## 2024-12-09 DIAGNOSIS — N95.1 PERIMENOPAUSAL VASOMOTOR SYMPTOMS: ICD-10-CM

## 2024-12-09 DIAGNOSIS — Z12.4 SCREENING FOR CERVICAL CANCER: ICD-10-CM

## 2024-12-09 PROCEDURE — 99204 OFFICE O/P NEW MOD 45 MIN: CPT | Performed by: OBSTETRICS & GYNECOLOGY

## 2024-12-09 RX ORDER — DROSPIRENONE AND ESTETROL 3-14.2(28)
1 KIT ORAL DAILY
Qty: 90 TABLET | Refills: 0 | Status: SHIPPED | OUTPATIENT
Start: 2024-12-09 | End: 2024-12-17

## 2024-12-09 RX ORDER — ESTRADIOL 0.1 MG/G
0.5 CREAM VAGINAL 3 TIMES WEEKLY
Qty: 42.5 G | Refills: 2 | Status: SHIPPED | OUTPATIENT
Start: 2024-12-09

## 2024-12-09 NOTE — PROGRESS NOTES
Name: Kennedy Blake      : 1980      MRN: [id number]  Encounter Provider: Batsheva Cramer MD  Encounter Date: 2024   Encounter department: [facility] OBSTETR[facility]YNECOLOGY [facility]  :  Assessment & Plan  Perimenopausal vasomotor symptoms  Reviewed sx of menopause both systemic and GUSM. We discussed lifestyle changes such as heating more nutrient rich, recommend Dr. Everardo Cee Eat to Live. Reviewed decreasing stress levels, sleeping 7-8 hours per night, hydrating with water, low intensity exercise, and changign second cup of coffee to green tea for overall health and wellness.   Reviewed option to start HRT vs OCP. Given that she is not on contraception and still cycling regularly, recommend starting OCP over HRT. Discussed nexstellis as an option for OCP. Discussed risk of VTE especially given mother's hx but she has no other risk factors. Reviewed warning signs.   Also discussed cyclical increase of her zoloft during luteal phase or dose increase in general.  Reviewed difference in vasomotor sx of menopause and starting estrace cream. Reviewed application and risk. Also recommend lubricant  Recommend trying PFPT again with estrace cream for EVA  Plan to f/u in 3 months   Orders:    Ambulatory referral to Obstetrics / Gynecology    Drospirenone-Estetrol (Nextstellis) 3-14.2 MG TABS; Take 1 tablet by mouth in the morning    estradiol (ESTRACE VAGINAL) 0.1 mg/g vaginal cream; Insert 0.5 g into the vagina 3 (three) times a week    Vaginal dryness    Orders:    estradiol (ESTRACE VAGINAL) 0.1 mg/g vaginal cream; Insert 0.5 g into the vagina 3 (three) times a week    EVA (stress urinary incontinence, female)    Orders:    estradiol (ESTRACE VAGINAL) 0.1 mg/g vaginal cream; Insert 0.5 g into the vagina 3 (three) times a week    Ambulatory referral to Physical Therapy; Future        History of Present Illness   HPI  Kennedy Blake is a 44 y.o. female who presents to  "discuss menopause/ perimenopause    Menses regular and monthly. Notices that they are noraml for 2-3 days and then spotting/lighter for 2-3 days; overall improved flow with regular timing  Sx: vaginal dryness and discomfort with intercourse  Occasional EVA (sometimes most recently daily, but usually less frequent). Did PFPT about 1.5 years ago. Interested in trying again  Occasional hot flashes but not currently; sleep good  Has mood changes and irritability especially in second half of her cycle  On zoloft 75 mg, overall notes improvement  Uses basal body temp/ cycle tracking for pregnancy prevention  Non-smoker  Denies hx of migraines with aura.  Denies personal or FH of VTE. Mother with hx of stroke but was heavy smoker.  Tolerated OCP in the past years ago    2023: NILM, neg HPV pap smear  8/2024: Normal mammogram          Review of Systems       Objective   /82 (BP Location: Left arm, Patient Position: Sitting, Cuff Size: Standard)   Ht 5' 9\" (1.753 m)   Wt 82.6 kg (182 lb)   LMP 11/25/2024   BMI 26.88 kg/m²      Physical Exam  Vitals and nursing note reviewed.   Constitutional:       General: She is not in acute distress.  Pulmonary:      Effort: Pulmonary effort is normal. No respiratory distress.   Skin:     General: Skin is warm and dry.   Neurological:      Mental Status: She is alert. Mental status is at baseline.           "

## 2024-12-09 NOTE — PATIENT INSTRUCTIONS
Instructions for estrace cream:  Don't use the applicator!  Apply a blueberry sized amount of the estrogen cream to the opening of the vagina (on vulva and near urethra) along with another blueberry sized amount inside of the vagina every night for 2 weeks then three nights per week after that. This dosing can be used as a maintenance medication.

## 2024-12-10 ENCOUNTER — TELEPHONE (OUTPATIENT)
Age: 44
End: 2024-12-10

## 2024-12-10 NOTE — TELEPHONE ENCOUNTER
PA for (Nextstellis) 3-14.2 MG SUBMITTED to [employer]    via    [x]CMM-KEY: [id number]  []Surescripts-Case ID #   []Availity-Auth ID # NDC #   []Faxed to plan   []Other website   []Phone call Case ID #     []PA sent as URGENT    All office notes, labs and other pertaining documents and studies sent. Clinical questions answered. Awaiting determination from insurance company.     Turnaround time for your insurance to make a decision on your Prior Authorization can take 7-21 business days.

## 2024-12-19 ENCOUNTER — HOSPITAL ENCOUNTER (OUTPATIENT)
Dept: GASTROENTEROLOGY | Facility: HOSPITAL | Age: 44
Setting detail: OUTPATIENT SURGERY
End: 2024-12-19
Attending: INTERNAL MEDICINE
Payer: COMMERCIAL

## 2024-12-19 ENCOUNTER — ANESTHESIA EVENT (OUTPATIENT)
Dept: GASTROENTEROLOGY | Facility: HOSPITAL | Age: 44
End: 2024-12-19
Payer: COMMERCIAL

## 2024-12-19 ENCOUNTER — ANESTHESIA (OUTPATIENT)
Dept: GASTROENTEROLOGY | Facility: HOSPITAL | Age: 44
End: 2024-12-19
Payer: COMMERCIAL

## 2024-12-19 VITALS
BODY MASS INDEX: 26.12 KG/M2 | TEMPERATURE: 97.5 F | HEIGHT: 69 IN | HEART RATE: 65 BPM | RESPIRATION RATE: 20 BRPM | WEIGHT: 176.37 LBS | SYSTOLIC BLOOD PRESSURE: 103 MMHG | OXYGEN SATURATION: 100 % | DIASTOLIC BLOOD PRESSURE: 58 MMHG

## 2024-12-19 DIAGNOSIS — Z12.11 SCREENING FOR COLON CANCER: ICD-10-CM

## 2024-12-19 PROCEDURE — 88305 TISSUE EXAM BY PATHOLOGIST: CPT | Performed by: PATHOLOGY

## 2024-12-19 PROCEDURE — 45380 COLONOSCOPY AND BIOPSY: CPT | Performed by: INTERNAL MEDICINE

## 2024-12-19 RX ORDER — SODIUM CHLORIDE, SODIUM LACTATE, POTASSIUM CHLORIDE, CALCIUM CHLORIDE 600; 310; 30; 20 MG/100ML; MG/100ML; MG/100ML; MG/100ML
INJECTION, SOLUTION INTRAVENOUS CONTINUOUS PRN
Status: DISCONTINUED | OUTPATIENT
Start: 2024-12-19 | End: 2024-12-19

## 2024-12-19 RX ORDER — PROPOFOL 10 MG/ML
INJECTION, EMULSION INTRAVENOUS AS NEEDED
Status: DISCONTINUED | OUTPATIENT
Start: 2024-12-19 | End: 2024-12-19

## 2024-12-19 RX ORDER — LIDOCAINE HYDROCHLORIDE 20 MG/ML
INJECTION, SOLUTION EPIDURAL; INFILTRATION; INTRACAUDAL; PERINEURAL AS NEEDED
Status: DISCONTINUED | OUTPATIENT
Start: 2024-12-19 | End: 2024-12-19

## 2024-12-19 RX ADMIN — PROPOFOL 20 MG: 10 INJECTION, EMULSION INTRAVENOUS at 14:25

## 2024-12-19 RX ADMIN — PROPOFOL 20 MG: 10 INJECTION, EMULSION INTRAVENOUS at 14:34

## 2024-12-19 RX ADMIN — SODIUM CHLORIDE, SODIUM LACTATE, POTASSIUM CHLORIDE, AND CALCIUM CHLORIDE: .6; .31; .03; .02 INJECTION, SOLUTION INTRAVENOUS at 14:09

## 2024-12-19 RX ADMIN — LIDOCAINE HYDROCHLORIDE 60 MG: 20 INJECTION, SOLUTION EPIDURAL; INFILTRATION; INTRACAUDAL; PERINEURAL at 14:21

## 2024-12-19 RX ADMIN — PROPOFOL 100 MG: 10 INJECTION, EMULSION INTRAVENOUS at 14:22

## 2024-12-19 RX ADMIN — PROPOFOL 20 MG: 10 INJECTION, EMULSION INTRAVENOUS at 14:28

## 2024-12-19 RX ADMIN — PROPOFOL 20 MG: 10 INJECTION, EMULSION INTRAVENOUS at 14:31

## 2024-12-19 NOTE — ANESTHESIA PREPROCEDURE EVALUATION
"Procedure:  COLONOSCOPY    Relevant Problems   CARDIO   (+) External hemorrhoids      HEMATOLOGY   (+) Anemia      NEURO/PSYCH   (+) Current moderate episode of major depressive disorder (HCC)   (+) Right arm weakness        Physical Exam    Airway    Mallampati score: II  TM Distance: >3 FB  Neck ROM: full     Dental       Cardiovascular      Pulmonary      Other Findings  post-pubertal.      Anesthesia Plan  ASA Score- 1     Anesthesia Type- IV sedation with anesthesia with ASA Monitors.         Additional Monitors:     Airway Plan:     Comment: Recent labs personally reviewed:  Lab Results       Component                Value               Date                       WBC                      6.13                08/29/2024                 HGB                      12.1                08/29/2024                 PLT                      353                 08/29/2024            Lab Results       Component                Value               Date                       K                        4.3                 08/29/2024                 BUN                      9                   08/29/2024                 CREATININE               0.61                08/29/2024            No results found for: \"PTT\"   No results found for: \"INR\"    Blood type     Patient was consented for sedation with IV anesthetic. Discussed that we will maintain spontaneous respirations and utilize supplemental O2. I discussed the risks of aspiration, hypoxia, laryngospasm and bronchospasm. I discussed the scenarios related to conversion to general anesthetic. All questions answered.     I, Angelina Escobedo MD, have personally seen and evaluated the patient prior to anesthetic care.  I have reviewed the pre-anesthetic record, medical history, allergies, medications and any other medical records if appropriate to the anesthetic care.  If a CRNA is involved in the case, I have reviewed the CRNA assessment, if present, and agree. Patient consented for IV " Sedation, general anesthesia as back up. Discussed risks of aspiration, IV infiltration, indications for conversion to general anesthesia. All questions and concerns addressed.   .       Plan Factors-Exercise tolerance (METS): >4 METS.    Chart reviewed.   Existing labs reviewed. Patient summary reviewed.    Patient is not a current smoker.  Patient did not smoke on day of surgery.    Obstructive sleep apnea risk education given perioperatively.        Induction- intravenous.    Postoperative Plan-         Informed Consent- Anesthetic plan and risks discussed with patient.  I personally reviewed this patient with the CRNA. Discussed and agreed on the Anesthesia Plan with the CRNA..

## 2024-12-19 NOTE — ANESTHESIA POSTPROCEDURE EVALUATION
Post-Op Assessment Note    CV Status:  Stable  Pain Score: 0    Pain management: adequate       Mental Status:  Alert and awake   Hydration Status:  Stable   PONV Controlled:  None   Airway Patency:  Patent and adequate     Post Op Vitals Reviewed: Yes    No anethesia notable event occurred.    Staff: Anesthesiologist, CRNA           Last Filed PACU Vitals:  Vitals Value Taken Time   Temp 97.5 °F (36.4 °C) 12/19/24 1440   Pulse 65 12/19/24 1500   /58 12/19/24 1500   Resp 20 12/19/24 1500   SpO2 100 % 12/19/24 1500       Modified Michel:  Activity: 2 (12/19/2024  3:00 PM)  Respiration: 2 (12/19/2024  3:00 PM)  Circulation: 2 (12/19/2024  3:00 PM)  Consciousness: 2 (12/19/2024  3:00 PM)  Oxygen Saturation: 2 (12/19/2024  3:00 PM)  Modified Michel Score: 10 (12/19/2024  3:00 PM)

## 2024-12-19 NOTE — H&P
"History and Physical -  [facility]  Kennedy Blake 44 y.o. female MRN: [id number]      HPI: Kennedy Blake is a 44 y.o. year old female who presents for ration of diarrhea, positive family history for colon polyps      REVIEW OF SYSTEMS: Per the HPI, and otherwise unremarkable.    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     Past Surgical History:   Procedure Laterality Date    AUGMENTATION BREAST Bilateral     AUGMENTATION MAMMAPLASTY Bilateral 01/01/2005     Social History   Social History     Substance and Sexual Activity   Alcohol Use Never     Social History     Substance and Sexual Activity   Drug Use Never     Social History     Tobacco Use   Smoking Status Never   Smokeless Tobacco Never     Family History   Problem Relation Age of Onset    Hypertension Mother     Diabetes Mother     Diabetes Father     Lymphoma Father     Breast cancer Maternal Grandmother 30    Coronary artery disease Maternal Grandfather     No Known Problems Paternal Grandmother     Skin cancer Maternal Aunt 66    No Known Problems Maternal Aunt     No Known Problems Maternal Aunt     No Known Problems Paternal Aunt        Meds/Allergies     Not in a hospital admission.    No Known Allergies    Objective     Blood pressure 103/59, pulse 63, temperature 98.4 °F (36.9 °C), temperature source Temporal, resp. rate 22, height 5' 9\" (1.753 m), weight 80 kg (176 lb 5.9 oz), last menstrual period 11/25/2024, SpO2 100%.      PHYSICAL EXAM    Gen: NAD  CV: RRR  CHEST: Clear  ABD: soft, NT/ND  EXT: no edema      ASSESSMENT/PLAN:  This is a 44 y.o. year old female here for colonoscopy, and she is stable and optimized for her procedure.          "

## 2024-12-23 PROCEDURE — 88305 TISSUE EXAM BY PATHOLOGIST: CPT | Performed by: PATHOLOGY

## 2024-12-26 ENCOUNTER — RESULTS FOLLOW-UP (OUTPATIENT)
Dept: GASTROENTEROLOGY | Facility: CLINIC | Age: 44
End: 2024-12-26

## 2024-12-31 NOTE — TELEPHONE ENCOUNTER
----- Message from Yinka Quezada DO sent at 12/31/2024  9:48 AM EST -----  Patient has not read [employer] message. Please call and share results.    Kennedy,     The biopsies taken throughout the colon showed no evidence of microscopic colitis and no evidence of cancer.

## 2025-01-03 ENCOUNTER — OFFICE VISIT (OUTPATIENT)
Dept: FAMILY MEDICINE CLINIC | Facility: CLINIC | Age: 45
End: 2025-01-03
Payer: COMMERCIAL

## 2025-01-03 VITALS
HEIGHT: 69 IN | HEART RATE: 87 BPM | SYSTOLIC BLOOD PRESSURE: 120 MMHG | OXYGEN SATURATION: 98 % | TEMPERATURE: 99.8 F | BODY MASS INDEX: 27.08 KG/M2 | WEIGHT: 182.8 LBS | DIASTOLIC BLOOD PRESSURE: 84 MMHG

## 2025-01-03 DIAGNOSIS — B34.9 VIRAL ILLNESS: ICD-10-CM

## 2025-01-03 DIAGNOSIS — U07.1 COVID-19: Primary | ICD-10-CM

## 2025-01-03 LAB
S PYO AG THROAT QL: NEGATIVE
SARS-COV-2 AG UPPER RESP QL IA: POSITIVE
VALID CONTROL: ABNORMAL

## 2025-01-03 PROCEDURE — 87811 SARS-COV-2 COVID19 W/OPTIC: CPT | Performed by: NURSE PRACTITIONER

## 2025-01-03 PROCEDURE — 99213 OFFICE O/P EST LOW 20 MIN: CPT | Performed by: NURSE PRACTITIONER

## 2025-01-03 PROCEDURE — 87880 STREP A ASSAY W/OPTIC: CPT | Performed by: NURSE PRACTITIONER

## 2025-01-03 RX ORDER — FLUTICASONE PROPIONATE 50 MCG
1 SPRAY, SUSPENSION (ML) NASAL DAILY
Qty: 18.2 ML | Refills: 1 | Status: SHIPPED | OUTPATIENT
Start: 2025-01-03

## 2025-01-03 RX ORDER — NIRMATRELVIR AND RITONAVIR 300-100 MG
3 KIT ORAL 2 TIMES DAILY
Qty: 30 TABLET | Refills: 0 | Status: SHIPPED | OUTPATIENT
Start: 2025-01-03 | End: 2025-01-08

## 2025-01-03 NOTE — PROGRESS NOTES
Name: Kennedy Blake      : 1980      MRN: [id number]  Encounter Provider: FANI Hall  Encounter Date: 1/3/2025   Encounter department: [facility] [address]  :  Assessment & Plan  COVID-19  Discussed with patient management of COVID-19 including symptomatic care and oral antivirals.  At this time patient wishes to start oral antivirals.  Will prescribe Paxlovid.  Educated patient on to take medication, possible side effects. Educated patient on length of quarantine.  Discussed with patient importance of increased rest, hydration, adequate nutrition.  Advised may start over-the-counter vitamin C, D, zinc.  Mucinex twice daily as needed.  Advised to seek immediate care for development of shortness of breath.     Orders:    nirmatrelvir & ritonavir (Paxlovid, 300/100,) tablet therapy pack; Take 3 tablets by mouth 2 (two) times a day for 5 days Take 2 nirmatrelvir tablets + 1 ritonavir tablet together per dose    fluticasone (FLONASE) 50 mcg/act nasal spray; 1 spray into each nostril daily    Viral illness  Advised supportive care, increase hydration, nutrition, rest.  Will treat COVID.    Orders:    POCT rapid ANTIGEN strepA    POCT Rapid Covid Ag           History of Present Illness     Patient presents to the office for evaluation of congestion, sore throat.  Symptoms started 4 days ago with post nasal drip.  Then developed nasal congestion, body aches, diarrhea.  Notes chest congestion, nonproductive cough, sore throat.  Has been taking Mucinex DM for symptoms.  Denies fevers, chills, blood in stool, vomiting, abdominal pain, SOB, chest pain.      Review of Systems   Constitutional:  Negative for activity change, appetite change, chills and fever.   HENT:  Positive for congestion, ear pain, postnasal drip, sinus pressure, sinus pain and sore throat. Negative for trouble swallowing.    Eyes:  Negative for photophobia and visual disturbance.   Respiratory:   "Positive for cough. Negative for chest tightness and shortness of breath.    Cardiovascular:  Negative for chest pain and palpitations.   Gastrointestinal:  Positive for diarrhea. Negative for abdominal pain, blood in stool, nausea and vomiting.   Genitourinary:  Negative for decreased urine volume.   Musculoskeletal:  Positive for myalgias. Negative for back pain.   Skin:  Negative for color change and rash.   Neurological:  Negative for dizziness and headaches.   Hematological:  Negative for adenopathy.   Psychiatric/Behavioral:  Negative for confusion.        Objective   /84 (BP Location: Left arm, Patient Position: Sitting)   Pulse 87   Temp 99.8 °F (37.7 °C)   Ht 5' 9\" (1.753 m)   Wt 82.9 kg (182 lb 12.8 oz)   LMP 11/25/2024 (Exact Date)   SpO2 98%   BMI 26.99 kg/m²      Physical Exam  Constitutional:       General: She is not in acute distress.     Appearance: Normal appearance. She is not ill-appearing.   HENT:      Head: Normocephalic and atraumatic.      Right Ear: Tympanic membrane, ear canal and external ear normal.      Left Ear: Tympanic membrane, ear canal and external ear normal.      Nose: Congestion present.      Right Sinus: No maxillary sinus tenderness or frontal sinus tenderness.      Left Sinus: No maxillary sinus tenderness or frontal sinus tenderness.      Mouth/Throat:      Mouth: Mucous membranes are moist.      Pharynx: Oropharynx is clear.   Eyes:      Conjunctiva/sclera: Conjunctivae normal.      Pupils: Pupils are equal, round, and reactive to light.   Cardiovascular:      Rate and Rhythm: Normal rate and regular rhythm.      Pulses: Normal pulses.      Heart sounds: Normal heart sounds. No murmur heard.  Pulmonary:      Effort: Pulmonary effort is normal.      Breath sounds: Normal breath sounds. No wheezing.   Abdominal:      General: Bowel sounds are normal.      Tenderness: There is no abdominal tenderness.   Musculoskeletal:         General: Normal range of motion.     "  Cervical back: Normal range of motion and neck supple.      Right lower leg: No edema.      Left lower leg: No edema.   Lymphadenopathy:      Cervical: No cervical adenopathy.   Skin:     General: Skin is warm and dry.   Neurological:      General: No focal deficit present.      Mental Status: She is alert and oriented to person, place, and time.   Psychiatric:         Mood and Affect: Mood normal.         Behavior: Behavior normal.

## 2025-01-05 ENCOUNTER — HOSPITAL ENCOUNTER (EMERGENCY)
Facility: HOSPITAL | Age: 45
Discharge: HOME | End: 2025-01-05
Attending: EMERGENCY MEDICINE
Payer: COMMERCIAL

## 2025-01-05 ENCOUNTER — NURSE TRIAGE (OUTPATIENT)
Dept: OTHER | Facility: OTHER | Age: 45
End: 2025-01-05

## 2025-01-05 ENCOUNTER — APPOINTMENT (EMERGENCY)
Dept: RADIOLOGY | Facility: HOSPITAL | Age: 45
End: 2025-01-05
Payer: COMMERCIAL

## 2025-01-05 VITALS
OXYGEN SATURATION: 96 % | TEMPERATURE: 98 F | SYSTOLIC BLOOD PRESSURE: 122 MMHG | DIASTOLIC BLOOD PRESSURE: 57 MMHG | BODY MASS INDEX: 27.28 KG/M2 | HEART RATE: 74 BPM | RESPIRATION RATE: 18 BRPM | WEIGHT: 184.75 LBS

## 2025-01-05 DIAGNOSIS — R05.1 ACUTE COUGH: ICD-10-CM

## 2025-01-05 DIAGNOSIS — U07.1 COVID: ICD-10-CM

## 2025-01-05 DIAGNOSIS — R07.89 CHEST TIGHTNESS: Primary | ICD-10-CM

## 2025-01-05 LAB
ATRIAL RATE: 69 BPM
P AXIS: 60 DEGREES
PR INTERVAL: 116 MS
QRS AXIS: 81 DEGREES
QRSD INTERVAL: 84 MS
QT INTERVAL: 380 MS
QTC INTERVAL: 407 MS
T WAVE AXIS: 71 DEGREES
VENTRICULAR RATE: 69 BPM

## 2025-01-05 PROCEDURE — 93005 ELECTROCARDIOGRAM TRACING: CPT

## 2025-01-05 PROCEDURE — 71045 X-RAY EXAM CHEST 1 VIEW: CPT

## 2025-01-05 PROCEDURE — 99285 EMERGENCY DEPT VISIT HI MDM: CPT | Performed by: EMERGENCY MEDICINE

## 2025-01-05 PROCEDURE — 99285 EMERGENCY DEPT VISIT HI MDM: CPT

## 2025-01-05 RX ORDER — PREDNISONE 20 MG/1
60 TABLET ORAL ONCE
Status: COMPLETED | OUTPATIENT
Start: 2025-01-05 | End: 2025-01-05

## 2025-01-05 RX ORDER — LORAZEPAM 1 MG/1
1 TABLET ORAL ONCE
Status: COMPLETED | OUTPATIENT
Start: 2025-01-05 | End: 2025-01-05

## 2025-01-05 RX ORDER — PREDNISONE 20 MG/1
60 TABLET ORAL DAILY
Qty: 9 TABLET | Refills: 0 | Status: SHIPPED | OUTPATIENT
Start: 2025-01-06 | End: 2025-01-08 | Stop reason: DRUGHIGH

## 2025-01-05 RX ORDER — ALBUTEROL SULFATE 90 UG/1
2 INHALANT RESPIRATORY (INHALATION) EVERY 6 HOURS PRN
Qty: 18 G | Refills: 0 | Status: SHIPPED | OUTPATIENT
Start: 2025-01-05

## 2025-01-05 RX ORDER — ALBUTEROL SULFATE 5 MG/ML
10 SOLUTION RESPIRATORY (INHALATION) ONCE
Status: COMPLETED | OUTPATIENT
Start: 2025-01-05 | End: 2025-01-05

## 2025-01-05 RX ORDER — SODIUM CHLORIDE FOR INHALATION 0.9 %
12 VIAL, NEBULIZER (ML) INHALATION ONCE
Status: COMPLETED | OUTPATIENT
Start: 2025-01-05 | End: 2025-01-05

## 2025-01-05 RX ADMIN — ISODIUM CHLORIDE 12 ML: 0.03 SOLUTION RESPIRATORY (INHALATION) at 15:38

## 2025-01-05 RX ADMIN — PREDNISONE 60 MG: 20 TABLET ORAL at 15:29

## 2025-01-05 RX ADMIN — IPRATROPIUM BROMIDE 1 MG: 0.5 SOLUTION RESPIRATORY (INHALATION) at 15:38

## 2025-01-05 RX ADMIN — LORAZEPAM 1 MG: 1 TABLET ORAL at 17:18

## 2025-01-05 RX ADMIN — ALBUTEROL SULFATE 10 MG: 2.5 SOLUTION RESPIRATORY (INHALATION) at 15:38

## 2025-01-05 NOTE — TELEPHONE ENCOUNTER
"Diagnosed with COVID, taking Paxlovid; having difficult time breathing. Coughing up mucous  Reason for Disposition   [1] MILD difficulty breathing (e.g., minimal/no SOB at rest, SOB with walking, pulse <100) AND [2] new-onset    Answer Assessment - Initial Assessment Questions  1. COVID-19 ONSET: \"When did the symptoms of COVID-19 first start?\"      12/28    2. DIAGNOSIS CONFIRMATION: \"How do you know you have had COVID-19?\" (e.g., positive lab test or self- test, diagnosed by doctor or NP/PA, symptoms after exposure)      Lab test    3. MAIN SYMPTOM:  \"What is your main concern or symptom right now?\" (e.g., breathing difficulty, cough, fatigue. loss of smell)      Exertional dyspnea; feels like her chest is heavy when talking    4. MAIN SYMPTOM ONSET: \"When did the  exertional dyspnea  start?\"      1/2/25    5. BETTER-SAME-WORSE: \"Are you getting better, staying the same, or getting worse over the last 1 to 2 weeks?\"      Chest feels worse    6. BREATHING DIFFICULTY: \"Are you having any trouble breathing?\" If Yes, ask: \"How bad is your breathing?\" (e.g., mild, moderate, severe)       Moderate to severe    7. O2 SATURATION MONITOR:  \"Do you use an oxygen saturation monitor (pulse oximeter) at home?\" If Yes, ask \"What is your reading (oxygen level) today?\" \"What is your usual oxygen saturation reading?\" (e.g., 95%)      100%, HR: 64    8. OTHER SYMPTOMS: \"Do you have any other symptoms?\"  (e.g., cough, fatigue, fever, headache, muscle pain, shortness of breath, weakness)      Headache, fatigue, night sweats, heavy chest, weakness; mental fog    9. RECENT MEDICAL VISIT: \"Have you been seen by a healthcare provider (doctor, NP, PA) for these persisting COVID-19 symptoms?\" If Yes, ask: \"When were you seen?\" (e.g., date)      1/3/25    9. HIGH RISK DISEASE: \"Do you have any chronic medical problems?\" (e.g., asthma, heart or lung disease, weak immune system, obesity, etc.)      Asthma, history of pneumonia     10. " "VACCINE: \"Have you gotten the COVID-19 vaccine?\" If Yes, ask: \"Which one, how many shots, when did you get it?\"       First 3 vaccines    11. PREGNANCY: \"Is there any chance you are pregnant?\" \"When was your last menstrual period?\"        Unsure    Protocols used: COVID-19 - Persisting Symptoms Follow-up Call-Adult-    "

## 2025-01-05 NOTE — ED PROVIDER NOTES
Time reflects when diagnosis was documented in both MDM as applicable and the Disposition within this note       Time User Action Codes Description Comment    1/5/2025  3:28 PM Lawrence Jaimes Add [R07.89] Chest tightness     1/5/2025  3:28 PM Erne Lawrence Add [R05.1] Acute cough     1/5/2025  3:28 PM Lawrence Jaimes Add [U07.1] COVID           ED Disposition       ED Disposition   Discharge    Condition   Stable    Date/Time   Sun Jan 5, 2025  3:28 PM    Comment   Kennedy Lou discharge to home/self care.                   Assessment & Plan       Medical Decision Making  44-year-old female history of asthma presenting with chest tightness.  Will trial breathing treatment and steroids.  Chest x-ray.  Reassess.    Chest x-ray interpreted by me without significant acute cardiopulmonary process.  Symptom improvement with medications.  Prescription sent to pharmacy. Discussed results and recommendations. Advised follow up PCP. Medication recommendations. Given instructions and return precautions. Patient/family at bedside acknowledged understanding of all written and verbal instructions and return precautions. Discharged.     Amount and/or Complexity of Data Reviewed  Radiology: ordered.    Risk  Prescription drug management.             Medications   albuterol inhalation solution 10 mg (10 mg Nebulization Given 1/5/25 1538)   ipratropium (ATROVENT) 0.02 % inhalation solution 1 mg (1 mg Nebulization Given 1/5/25 1538)   sodium chloride 0.9 % inhalation solution 12 mL (12 mL Nebulization Given 1/5/25 1538)   predniSONE tablet 60 mg (60 mg Oral Given 1/5/25 1529)   LORazepam (ATIVAN) tablet 1 mg (1 mg Oral Given 1/5/25 1718)       ED Risk Strat Scores                          SBIRT 20yo+      Flowsheet Row Most Recent Value   Initial Alcohol Screen: US AUDIT-C     1. How often do you have a drink containing alcohol? 0 Filed at: 01/05/2025 7508   2. How many drinks containing alcohol do you have on a typical day you are  drinking?  0 Filed at: 01/05/2025 1415   3b. FEMALE Any Age, or MALE 65+: How often do you have 4 or more drinks on one occassion? 0 Filed at: 01/05/2025 1415   Audit-C Score 0 Filed at: 01/05/2025 1415   SUZIE: How many times in the past year have you...    Used an illegal drug or used a prescription medication for non-medical reasons? Never Filed at: 01/05/2025 1415                            History of Present Illness       Chief Complaint   Patient presents with    Shortness of Breath     Pt c/o SOB and chest heaviness since being Dx with Covid on 1/3/2025. Pt states she has asthma and inhaler is giving minimal relief.        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    BRCA2 negative 01/01/2008      Past Surgical History:   Procedure Laterality Date    AUGMENTATION BREAST Bilateral     AUGMENTATION MAMMAPLASTY Bilateral 01/01/2005      Family History   Problem Relation Age of Onset    Hypertension Mother     Diabetes Mother     Diabetes Father     Lymphoma Father     Breast cancer Maternal Grandmother 30    Coronary artery disease Maternal Grandfather     No Known Problems Paternal Grandmother     Skin cancer Maternal Aunt 66    No Known Problems Maternal Aunt     No Known Problems Maternal Aunt     No Known Problems Paternal Aunt       Social History     Tobacco Use    Smoking status: Never    Smokeless tobacco: Never   Vaping Use    Vaping status: Never Used   Substance Use Topics    Alcohol use: Never    Drug use: Never      E-Cigarette/Vaping    E-Cigarette Use Never User       E-Cigarette/Vaping Substances      I have reviewed and agree with the history as documented.     44-year-old female history of asthma presenting with chest tightness.  Patient reports about 1 week of viral symptoms and known COVID-positive.  Patient reports trying her albuterol inhaler but still having persistent chest tightness.  Denies any chest pain.  Reports cough productive of some phlegm.  Currently on  Mucinex.  Denies abdominal pain nausea vomiting diarrhea.  Denies any other complaints.  Chart reviewed.      Past Medical History:  01/01/2008: BRCA1 negative  01/01/2008: BRCA2 negative  Family History: non-contributory  Social History          Review of Systems   Constitutional:  Negative for appetite change, chills, diaphoresis, fever and unexpected weight change.   HENT:  Positive for congestion and rhinorrhea.    Eyes:  Negative for photophobia and visual disturbance.   Respiratory:  Positive for cough and chest tightness. Negative for shortness of breath.    Cardiovascular:  Negative for chest pain, palpitations and leg swelling.   Gastrointestinal:  Negative for abdominal distention, abdominal pain, blood in stool, constipation, diarrhea, nausea and vomiting.   Genitourinary:  Negative for dysuria and hematuria.   Musculoskeletal:  Negative for back pain, joint swelling, neck pain and neck stiffness.   Skin:  Negative for color change, pallor, rash and wound.   Neurological:  Negative for dizziness, syncope, weakness, light-headedness and headaches.   Psychiatric/Behavioral:  Negative for agitation.    All other systems reviewed and are negative.          Objective       ED Triage Vitals [01/05/25 1411]   Temperature Pulse Blood Pressure Respirations SpO2 Patient Position - Orthostatic VS   98 °F (36.7 °C) 71 119/76 18 95 % Sitting      Temp Source Heart Rate Source BP Location FiO2 (%) Pain Score    Temporal Monitor Left arm -- --      Vitals      Date and Time Temp Pulse SpO2 Resp BP Pain Score FACES Pain Rating User   01/05/25 1643 -- 74 96 % 18 122/57 -- -- TO   01/05/25 1411 98 °F (36.7 °C) 71 95 % 18 119/76 -- -- MS            Physical Exam  Vitals and nursing note reviewed.   Constitutional:       General: She is not in acute distress.     Appearance: Normal appearance. She is well-developed. She is not ill-appearing, toxic-appearing or diaphoretic.   HENT:      Head: Normocephalic and atraumatic.       Nose: Nose normal. No congestion or rhinorrhea.      Mouth/Throat:      Mouth: Mucous membranes are moist.      Pharynx: Oropharynx is clear. No oropharyngeal exudate or posterior oropharyngeal erythema.   Eyes:      General: No scleral icterus.        Right eye: No discharge.         Left eye: No discharge.      Extraocular Movements: Extraocular movements intact.      Conjunctiva/sclera: Conjunctivae normal.      Pupils: Pupils are equal, round, and reactive to light.   Neck:      Vascular: No JVD.      Trachea: No tracheal deviation.      Comments: Supple. Normal range of motion.   Cardiovascular:      Rate and Rhythm: Normal rate and regular rhythm.      Heart sounds: Normal heart sounds. No murmur heard.     No friction rub. No gallop.      Comments: Normal rate and regular rhythm  Pulmonary:      Effort: Pulmonary effort is normal. No respiratory distress.      Breath sounds: No stridor. Examination of the right-lower field reveals wheezing. Examination of the left-lower field reveals wheezing. Wheezing present. No rales.      Comments: Mild wheezing  Chest:      Chest wall: No tenderness.   Abdominal:      General: Bowel sounds are normal. There is no distension.      Palpations: Abdomen is soft.      Tenderness: There is no abdominal tenderness. There is no right CVA tenderness, left CVA tenderness, guarding or rebound.      Comments: Soft, nontender, nondistended.  Normal bowel sounds throughout   Musculoskeletal:         General: No swelling, tenderness, deformity or signs of injury. Normal range of motion.      Cervical back: Normal range of motion and neck supple. No rigidity. No muscular tenderness.      Right lower leg: No edema.      Left lower leg: No edema.   Lymphadenopathy:      Cervical: No cervical adenopathy.   Skin:     General: Skin is warm and dry.      Coloration: Skin is not pale.      Findings: No erythema or rash.   Neurological:      General: No focal deficit present.      Mental  Status: She is alert. Mental status is at baseline.      Sensory: No sensory deficit.      Motor: No weakness or abnormal muscle tone.      Coordination: Coordination normal.      Gait: Gait normal.      Comments: Alert.  Strength and sensation grossly intact.  Ambulatory without difficulty at baseline.    Psychiatric:         Behavior: Behavior normal.         Thought Content: Thought content normal.         Results Reviewed       None            XR chest 1 view portable    (Results Pending)       Procedures    ED Medication and Procedure Management   Prior to Admission Medications   Prescriptions Last Dose Informant Patient Reported? Taking?   Azelastine HCl 137 MCG/SPRAY SOLN  Self Yes No   albuterol (Ventolin HFA) 90 mcg/act inhaler  Self No No   Sig: Inhale 2 puffs every 6 (six) hours as needed for wheezing   ciclopirox (PENLAC) 8 % solution  Self Yes No   Sig: APPLY TO NAILS DAILY - REMOVE ON 7TH DAY WITH POLISH REMOVER   estradiol (ESTRACE VAGINAL) 0.1 mg/g vaginal cream   No No   Sig: Insert 0.5 g into the vagina 3 (three) times a week   ferrous sulfate 324 (65 Fe) mg   No No   Sig: TAKE 1 TABLET BY MOUTH 2 TIMES A DAY BEFORE MEALS.   fluticasone (FLONASE) 50 mcg/act nasal spray   No No   Si spray into each nostril daily   hydrocortisone (ANUSOL-HC) 2.5 % rectal cream  Self No No   Sig: Apply topically 2 (two) times a day   nirmatrelvir & ritonavir (Paxlovid, 300/100,) tablet therapy pack   No No   Sig: Take 3 tablets by mouth 2 (two) times a day for 5 days Take 2 nirmatrelvir tablets + 1 ritonavir tablet together per dose   sertraline (ZOLOFT) 25 mg tablet  Self No No   Sig: Take 1 tablet (25 mg total) by mouth daily at bedtime   sertraline (ZOLOFT) 50 mg tablet  Self No No   Sig: TAKE 1 TABLET BY MOUTH EVERY DAY   valACYclovir (VALTREX) 1,000 mg tablet   No No   Sig: Take 2 tablets by mouth every 12 hours x 1 day      Facility-Administered Medications: None     Patient's Medications   Discharge  Prescriptions    ALBUTEROL (PROAIR HFA) 90 MCG/ACT INHALER    Inhale 2 puffs every 6 (six) hours as needed for shortness of breath or wheezing       Start Date: 1/5/2025  End Date: --       Order Dose: 2 puffs       Quantity: 18 g    Refills: 0    PREDNISONE 20 MG TABLET    Take 3 tablets (60 mg total) by mouth daily for 3 days Do not start before January 6, 2025.       Start Date: 1/6/2025  End Date: 1/9/2025       Order Dose: 60 mg       Quantity: 9 tablet    Refills: 0     No discharge procedures on file.  ED SEPSIS DOCUMENTATION   Time reflects when diagnosis was documented in both MDM as applicable and the Disposition within this note       Time User Action Codes Description Comment    1/5/2025  3:28 PM Lawrence Jaimes [R07.89] Chest tightness     1/5/2025  3:28 PM Lawrence Jaimes Add [R05.1] Acute cough     1/5/2025  3:28 PM Lawrence Jaimes [U07.1] EZEQUIEL Jaimes MD  01/05/25 8380

## 2025-01-06 NOTE — TELEPHONE ENCOUNTER
She may stop the prednisone and utilize the albuterol inhaler every 4-6 hours as needed for shortness of breath or wheezing.

## 2025-01-06 NOTE — TELEPHONE ENCOUNTER
She went to the ED per on call provider, do you want a follow up with her on Thursday or today if not better?

## 2025-01-08 ENCOUNTER — OFFICE VISIT (OUTPATIENT)
Dept: FAMILY MEDICINE CLINIC | Facility: CLINIC | Age: 45
End: 2025-01-08
Payer: COMMERCIAL

## 2025-01-08 ENCOUNTER — NURSE TRIAGE (OUTPATIENT)
Age: 45
End: 2025-01-08

## 2025-01-08 VITALS
BODY MASS INDEX: 27.25 KG/M2 | TEMPERATURE: 97.6 F | DIASTOLIC BLOOD PRESSURE: 72 MMHG | SYSTOLIC BLOOD PRESSURE: 118 MMHG | WEIGHT: 184 LBS | HEIGHT: 69 IN | HEART RATE: 83 BPM | OXYGEN SATURATION: 95 %

## 2025-01-08 DIAGNOSIS — R06.2 WHEEZING: ICD-10-CM

## 2025-01-08 DIAGNOSIS — R07.89 CHEST TIGHTNESS: ICD-10-CM

## 2025-01-08 DIAGNOSIS — N95.1 PERIMENOPAUSAL VASOMOTOR SYMPTOMS: Primary | ICD-10-CM

## 2025-01-08 DIAGNOSIS — J40 BRONCHITIS: Primary | ICD-10-CM

## 2025-01-08 PROCEDURE — 94640 AIRWAY INHALATION TREATMENT: CPT | Performed by: NURSE PRACTITIONER

## 2025-01-08 PROCEDURE — 99213 OFFICE O/P EST LOW 20 MIN: CPT | Performed by: NURSE PRACTITIONER

## 2025-01-08 RX ORDER — AZITHROMYCIN 250 MG/1
TABLET, FILM COATED ORAL
Qty: 6 TABLET | Refills: 0 | Status: SHIPPED | OUTPATIENT
Start: 2025-01-08 | End: 2025-01-12

## 2025-01-08 RX ORDER — IPRATROPIUM BROMIDE AND ALBUTEROL SULFATE 2.5; .5 MG/3ML; MG/3ML
3 SOLUTION RESPIRATORY (INHALATION) ONCE
Status: COMPLETED | OUTPATIENT
Start: 2025-01-08 | End: 2025-01-08

## 2025-01-08 RX ORDER — PREDNISONE 10 MG/1
TABLET ORAL
Qty: 30 TABLET | Refills: 0 | Status: SHIPPED | OUTPATIENT
Start: 2025-01-08

## 2025-01-08 RX ORDER — NORETHINDRONE ACETATE AND ETHINYL ESTRADIOL, ETHINYL ESTRADIOL AND FERROUS FUMARATE 1MG-10(24)
1 KIT ORAL DAILY
Qty: 90 TABLET | Refills: 3 | Status: SHIPPED | OUTPATIENT
Start: 2025-01-08

## 2025-01-08 RX ADMIN — IPRATROPIUM BROMIDE AND ALBUTEROL SULFATE 3 ML: 2.5; .5 SOLUTION RESPIRATORY (INHALATION) at 16:46

## 2025-01-08 NOTE — TELEPHONE ENCOUNTER
Pt reports she was seen/treated by PCP, and then the ER for SOB. Pt reports she is still having her previous s/s, coughing up mucus and the shortness of breath now is only with exertion. Pt denies any other s/s worsening and that she hasn't gotten better.  Triage Nurse made pt a F/U sick appt for today. Triage nurse advised pt to got to the ER if her SOB worsened or any other s/s developed.  PCP please further advise.

## 2025-01-08 NOTE — PROGRESS NOTES
Name: Kennedy Blake      : 1980      MRN: [id number]  Encounter Provider: FANI Hall  Encounter Date: 2025   Encounter department: [facility] [address]  :  Assessment & Plan  Bronchitis  Discussed with patient importance of self-care, rest, increase hydration.  Can continue on Mucinex twice daily as discussed as well as Albuterol inhaler.  Avoid irritants and extreme temperatures.  Will initiate azithromycin and prednisone taper dose as prescribed.  To seek immediate care for shortness of breath/difficulty breathing, chest pain.      Orders:    azithromycin (ZITHROMAX) 250 mg tablet; Take 2 tablets today then 1 tablet daily x 4 days    predniSONE 10 mg tablet; 50 mg p.o. qd x2 days, 40 mg p.o. qd x2 days, 30 mg p.o. qd x2 days, 20 mg p.o. qd x2 days, and 10 mg p.o. qd x2 days. Take with food    Wheezing  Duo-neb given in office, tolerated well.  To initiate prednisone taper dose, continue on Albuterol inhaler as prescribed.    Orders:    predniSONE 10 mg tablet; 50 mg p.o. qd x2 days, 40 mg p.o. qd x2 days, 30 mg p.o. qd x2 days, 20 mg p.o. qd x2 days, and 10 mg p.o. qd x2 days. Take with food    ipratropium-albuterol (DUO-NEB) 0.5-2.5 mg/3 mL inhalation solution 3 mL    Chest tightness  Encouraged increased hydration, steam, cool/moist air, avoid irritants.  Continue on Mucinex twice daily as discussed, albuterol as prescribed.  Prednisone as prescribed.    Orders:    predniSONE 10 mg tablet; 50 mg p.o. qd x2 days, 40 mg p.o. qd x2 days, 30 mg p.o. qd x2 days, 20 mg p.o. qd x2 days, and 10 mg p.o. qd x2 days. Take with food    ipratropium-albuterol (DUO-NEB) 0.5-2.5 mg/3 mL inhalation solution 3 mL           History of Present Illness     Patient presents to the office for evaluation of chest congestion and cough.  Patient was seen in office last week, tested positive for COVID 5 days ago on 1/3.  Given course of Paxlovid, which patient  "completed.  Patient went to the ED 2 days later for similar symptoms.  Patient was given heart neb and 60 mg of prednisone.  Chest x-ray was negative for any pneumonia.  Patient states that after the heart neb and prednisone, had an anxiety attack.  Never started the prednisone course that was prescribed to her in the ED.  She does tolerate her albuterol inhaler without side effects or anxiety.  Patient denies fever, body aches.  Symptoms are better today than they were yesterday, but cough is more productive.  Patient denies hemoptysis, chest pain, palpitations.      Review of Systems   Constitutional:  Negative for chills and fever.   HENT:  Positive for congestion. Negative for ear pain, sore throat and trouble swallowing.    Eyes:  Negative for photophobia and visual disturbance.   Respiratory:  Positive for cough and chest tightness. Negative for shortness of breath and wheezing.    Cardiovascular:  Negative for chest pain and palpitations.   Gastrointestinal:  Negative for abdominal pain, nausea and vomiting.   Genitourinary:  Negative for decreased urine volume.   Musculoskeletal:  Negative for arthralgias and myalgias.   Skin:  Negative for color change and rash.   Neurological:  Negative for dizziness, weakness, light-headedness and headaches.   Hematological:  Negative for adenopathy.   Psychiatric/Behavioral:  Negative for confusion.        Objective   /72 (BP Location: Left arm, Patient Position: Sitting, Cuff Size: Adult)   Pulse 83   Temp 97.6 °F (36.4 °C) (Tympanic)   Ht 5' 9\" (1.753 m)   Wt 83.5 kg (184 lb) Comment: pulled from last visit  LMP 11/25/2024 (Exact Date)   SpO2 95%   BMI 27.17 kg/m²      Physical Exam  Vitals reviewed.   Constitutional:       General: She is not in acute distress.     Appearance: Normal appearance. She is not ill-appearing.   HENT:      Head: Normocephalic and atraumatic.      Right Ear: Tympanic membrane, ear canal and external ear normal.      Left Ear: " Tympanic membrane, ear canal and external ear normal.      Nose: Nose normal.      Mouth/Throat:      Mouth: Mucous membranes are moist.      Pharynx: Oropharynx is clear.   Eyes:      Conjunctiva/sclera: Conjunctivae normal.      Pupils: Pupils are equal, round, and reactive to light.   Cardiovascular:      Rate and Rhythm: Normal rate and regular rhythm.      Pulses: Normal pulses.      Heart sounds: Normal heart sounds.   Pulmonary:      Effort: Pulmonary effort is normal. No respiratory distress.      Breath sounds: No decreased air movement. Examination of the right-middle field reveals wheezing. Examination of the right-lower field reveals wheezing. Wheezing present.   Musculoskeletal:         General: Normal range of motion.      Cervical back: Normal range of motion and neck supple.   Skin:     General: Skin is warm and dry.   Neurological:      General: No focal deficit present.      Mental Status: She is alert and oriented to person, place, and time.   Psychiatric:         Mood and Affect: Mood normal.         Behavior: Behavior normal.         Mini neb    Performed by: FANI Hall  Authorized by: FANI Hall  Universal Protocol:  procedure performed by consultantConsent: Verbal consent obtained.  Risks and benefits: risks, benefits and alternatives were discussed  Consent given by: patient  Timeout called at: 1/8/2025 3:45 PM.  Patient identity confirmed: provided demographic data and verbally with patient    Number of treatments:  1  Treatment 1:   Pre-Procedure     Symptoms:  Wheezing    Lung Sounds:  Wheezing    Medication Administered:  Duoneb - Albuterol 2.5 mg/Atrovent 0.5 mg  Nebulizer Comments:  No adverse reaction following mini neb, symptoms improved

## 2025-01-14 PROCEDURE — 93010 ELECTROCARDIOGRAM REPORT: CPT | Performed by: INTERNAL MEDICINE

## 2025-01-22 DIAGNOSIS — F32.1 CURRENT MODERATE EPISODE OF MAJOR DEPRESSIVE DISORDER, UNSPECIFIED WHETHER RECURRENT (HCC): ICD-10-CM

## 2025-01-22 RX ORDER — SERTRALINE HYDROCHLORIDE 25 MG/1
25 TABLET, FILM COATED ORAL
Qty: 90 TABLET | Refills: 1 | Status: SHIPPED | OUTPATIENT
Start: 2025-01-22

## 2025-03-10 ENCOUNTER — TELEPHONE (OUTPATIENT)
Age: 45
End: 2025-03-10

## 2025-03-10 DIAGNOSIS — Z13.6 ENCOUNTER FOR SCREENING FOR CARDIOVASCULAR DISORDERS: Primary | ICD-10-CM

## 2025-03-10 DIAGNOSIS — M67.912 DYSFUNCTION OF LEFT ROTATOR CUFF: ICD-10-CM

## 2025-03-10 DIAGNOSIS — M75.42 SUBACROMIAL IMPINGEMENT OF LEFT SHOULDER: Primary | ICD-10-CM

## 2025-03-10 NOTE — TELEPHONE ENCOUNTER
Called and further explained details of a CT arthrogram to the patient. She appreciated the information.

## 2025-03-10 NOTE — TELEPHONE ENCOUNTER
We do not do MRI with sedation.  If she can't do MRI then CT arthrogram is next step.  Its open and takes less than a minute as opposed to 40 minutes.  It does require an injection for the dye in shoulder for CT

## 2025-03-10 NOTE — TELEPHONE ENCOUNTER
Caller: Patient- Kennedy     Doctor: Dr. Velasquez    Reason for call: Patient is calling in stating that the she completed a stand up MRI relating to her left shoulder. The patient is still experiencing pain to the area as she is not even able to complete an open MRI due to her claustrophobia. She is stating that she would need to be sedated for this as her face just cannot be under anything as the whole process just triggers her and gives her a panic attack. The patient is asking as to how she is able to go about this, please advise and reach out to patient relating this.    Call back#: [phone]

## 2025-03-10 NOTE — TELEPHONE ENCOUNTER
Patient called to have an updated order for a coronary calcium scan.     Patient is getting her CT scan completed and is hoping to have both tests done on the same day.    Please return call once ordered.     Please advise  Thank you

## 2025-03-10 NOTE — TELEPHONE ENCOUNTER
Caller: Self    Doctor: Dr. Velasquez    Reason for call: Patient callback with additional questions about CT arthrogram before scheduling. Warm transferred to clinical pod PA     Call back#: [phone]

## 2025-03-10 NOTE — TELEPHONE ENCOUNTER
Relayed message to patient, Dr. Velasquez does not order MRI's with sedation, a CT arthrogram will be next study of choice. To schedule this she can call [phone] and then call our office to schedule a follow up with Dr. Velasquez to review results. Any further questions she can call our office.

## 2025-03-18 ENCOUNTER — TELEPHONE (OUTPATIENT)
Dept: FAMILY MEDICINE CLINIC | Facility: CLINIC | Age: 45
End: 2025-03-18

## 2025-03-18 NOTE — TELEPHONE ENCOUNTER
Patient is out of town and started with PND last night.  Stuffy nose and then it runs.  She did spt up some green phlegm. No headache, no fever, no sore throat.  She has been using her nasal spray with no relief.     She is asking if something could be sent in to [email or web address]    Please advise    Patient would like a call - she can't get into her mychart.

## 2025-03-27 NOTE — NURSING NOTE
Unable to reach patient, sent instructions and directions in e-mail that is linked to this account and via epic my chart. See message below.  Upcoming Radiology appointment at [facility]  Good afternoon Ms. Blake,     You have an upcoming appointment here at [facility] on 4/15/25 @ 1 pm  for a left shoulder CT arthrogram utilizing Fluoroscopy (x-ray) guidance . Please arrive 15 minutes prior to your appointment time.    [facility] radiology is located at [address] [place] [address] B. Present yourself to admission services that is located on the ground floor to the left of the information desk in Building B to register for your test.. Please sign in using the Kiosk (if any issues with your registration, an admission personnel will assist you). Once registered you can go up to the 1st floor - radiology department (it will be to the right at the main corridor on the 1st floor).      You will have your Fluoro guided procedure of injecting the contrast that will be seen in the CT directly into your left shoulder    then be guided to our CT suite for your images.     For this test you may eat, drink, take all your usual medications, including aspirin should you take this medication. In regard to driving - if you are not taking any medication for anti-anxiety for the procedure you may drive yourself. BUT  if you are taking medications for anxiety (Xanax, Ativan etc.)  for the procedure you will need a .           These both appointments will be approximately about 1.5 hours in total.    If you have any questions or concerns regarding the above information,  please feel free to send me a response via a call, email or uTaP chart.      If we have not spoken yet and understand the above information and have no further questions or concerns can you please send me a response via a call, email or uTaP chart that you have received and understood the  information.     May you have a good day,   Mariaa Chamberlain RN  [facility] Radiology RN  [address] [place] [address]  [phone] (Office)  [phone] (Fax)  [email or web address]

## 2025-03-28 ENCOUNTER — TELEPHONE (OUTPATIENT)
Age: 45
End: 2025-03-28

## 2025-03-28 NOTE — TELEPHONE ENCOUNTER
Called patient and she just wants to make sure she is having the right test done. Her mom had a stroked and this runs in he family. She is having this test done as a preventive but it is not covered all the way so before she has this test she wants to make sure it is the right one. She is schedule for Monday 3/31/ at 12. Please advise

## 2025-03-28 NOTE — TELEPHONE ENCOUNTER
Phone call from patient stating she Is scheduled for a ct calcium test on 3/31/2025 and she was suppose to have this test a year ago and cancelled and she remembers being prescribed a pill to take an hour prior to the test. I did look back at the telephone encounters and she was prescribed metoprolol tartrate (LOPRESSOR) 100 mg tablet [[id number]]  DISCONTINUED    Order Details  Dose: 100 mg Route: Oral Frequency: Once   Dispense Quantity: 1 tablet         1/26/24 11:53 AM  Note     Spoke with Radiology at [facility]. They stated that patient is coming in for a CTA on 02/16 and they said that patient should be prescribed a beta blocker to be taken 1 hour prior to the exam so the patient's heart rate is rested.     Please send this to patient's pharmacy. Please advise, thank you!          Please advise. Thank you.

## 2025-03-31 ENCOUNTER — APPOINTMENT (OUTPATIENT)
Dept: RADIOLOGY | Facility: OTHER | Age: 45
End: 2025-03-31
Payer: COMMERCIAL

## 2025-03-31 ENCOUNTER — OFFICE VISIT (OUTPATIENT)
Dept: OBGYN CLINIC | Facility: OTHER | Age: 45
End: 2025-03-31
Payer: COMMERCIAL

## 2025-03-31 VITALS — BODY MASS INDEX: 27.25 KG/M2 | HEIGHT: 69 IN | WEIGHT: 184 LBS

## 2025-03-31 DIAGNOSIS — M75.42 SUBACROMIAL IMPINGEMENT OF LEFT SHOULDER: ICD-10-CM

## 2025-03-31 DIAGNOSIS — M24.812 INTERNAL DERANGEMENT OF LEFT SHOULDER: Primary | ICD-10-CM

## 2025-03-31 DIAGNOSIS — F32.1 CURRENT MODERATE EPISODE OF MAJOR DEPRESSIVE DISORDER, UNSPECIFIED WHETHER RECURRENT (HCC): ICD-10-CM

## 2025-03-31 DIAGNOSIS — M67.912 DYSFUNCTION OF LEFT ROTATOR CUFF: ICD-10-CM

## 2025-03-31 PROCEDURE — 99213 OFFICE O/P EST LOW 20 MIN: CPT | Performed by: ORTHOPAEDIC SURGERY

## 2025-03-31 PROCEDURE — 73030 X-RAY EXAM OF SHOULDER: CPT

## 2025-03-31 NOTE — ASSESSMENT & PLAN NOTE
Patient continues to have an examination consistent with a rotator cuff pathology that has been refractory to non operative treatments up to this point. At this time, she is indicated to obtain further diagnostic imaging in the form of a CT arthrogram, as the patient is severely claustrophobic, to further evaluate her rotator cuff. She understood and all questions were answered.   Return to formal physical therapy and continue home exercise program.   Follow up after CT arthrogram for discussion of results and further treatment options based on these results

## 2025-03-31 NOTE — PROGRESS NOTES
"  Assessment & Plan  Subacromial impingement of left shoulder    Internal derangement of left shoulder  Patient continues to have an examination consistent with a rotator cuff pathology that has been refractory to non operative treatments up to this point. At this time, she is indicated to obtain further diagnostic imaging in the form of a CT arthrogram, as the patient is severely claustrophobic, to further evaluate her rotator cuff. She understood and all questions were answered.   Return to formal physical therapy and continue home exercise program.   Follow up after CT arthrogram for discussion of results and further treatment options based on these results     Subjective:   Patient ID: Kennedy Blake is a 44 y.o. female presents today for a follow up visit for her left shoulder. She has treated in the past for subacromial impingement with an injection and PT back in March and May with benefit. Today, the patient reports that her symptoms have been increasing about the shoulder over the past few months without any acute injury or trauma. Pain is worse with overhead and repetitive motions. She states that she is unable to perform yoga due to the pain in her left shoulder. No numbness or tingling. No fevers or chills.      The following portions of the patient's history were reviewed and updated as appropriate: allergies, current medications, past family history, past medical history, past social history, past surgical history and problem list.    Objective:  Ht 5' 9\" (1.753 m)   Wt 83.5 kg (184 lb)   BMI 27.17 kg/m²       Left Shoulder Exam     Range of Motion   The patient has normal left shoulder ROM.    Muscle Strength   Abduction: 4/5   External rotation: 5/5     Tests   Martínez test: positive  Impingement: positive    Other   Erythema: absent  Sensation: normal  Pulse: present             Physical Exam  Constitutional:       Appearance: She is well-developed.   Eyes:      Pupils: Pupils are equal, round, " and reactive to light.   Pulmonary:      Effort: Pulmonary effort is normal.      Breath sounds: Normal breath sounds.   Skin:     General: Skin is warm and dry.   Neurological:      Mental Status: She is alert and oriented to person, place, and time.   Psychiatric:         Behavior: Behavior normal.         Thought Content: Thought content normal.         Judgment: Judgment normal.       I have personally reviewed pertinent films in PACS and my interpretation is as follows.    X Ray Left Shoulder 3/31/25: No acute osseous abnormalities or degenerative changes.       Records Reviewed: office notes from PT and our visits    Scribe Attestation      I,:  Oscar Davis am acting as a scribe while in the presence of the attending physician.:       I,:  Neftali Velasquez MD personally performed the services described in this documentation    as scribed in my presence.:

## 2025-04-01 ENCOUNTER — TELEPHONE (OUTPATIENT)
Age: 45
End: 2025-04-01

## 2025-04-01 NOTE — TELEPHONE ENCOUNTER
SENT TO [facility] IN BASKET    Caller: Kennedy    Doctor: Dr. Velasquez     Reason for call: Received a letter from her insurance that a procedure was denied and she was just wanting to get more information     Call back#: [phone]

## 2025-04-08 ENCOUNTER — TELEPHONE (OUTPATIENT)
Dept: OBGYN CLINIC | Facility: HOSPITAL | Age: 45
End: 2025-04-08

## 2025-04-08 NOTE — TELEPHONE ENCOUNTER
IN-BASKET MESSAGE SENT TO [facility]      Caller: Patient    Doctor: Dr. Velasquez    Reason for call: Patient calling to discuss denial for CT, would like a call back.    Call back#: [phone]

## 2025-04-09 ENCOUNTER — TELEPHONE (OUTPATIENT)
Age: 45
End: 2025-04-09

## 2025-04-09 NOTE — TELEPHONE ENCOUNTER
Caller: Patient     Doctor: Dr. Velasquez     Reason for call: Patient is requesting the office reach out to Insurance to see what the hold up is for the approve of PT // Please advise    Call back#: [phone]

## 2025-04-15 ENCOUNTER — HOSPITAL ENCOUNTER (OUTPATIENT)
Dept: RADIOLOGY | Facility: HOSPITAL | Age: 45
Discharge: HOME | End: 2025-04-15
Payer: COMMERCIAL

## 2025-04-15 DIAGNOSIS — M67.912 DYSFUNCTION OF LEFT ROTATOR CUFF: ICD-10-CM

## 2025-04-15 DIAGNOSIS — M75.42 SUBACROMIAL IMPINGEMENT OF LEFT SHOULDER: ICD-10-CM

## 2025-04-15 DIAGNOSIS — Z13.6 ENCOUNTER FOR SCREENING FOR CARDIOVASCULAR DISORDERS: ICD-10-CM

## 2025-04-15 DIAGNOSIS — M24.812 INTERNAL DERANGEMENT OF LEFT SHOULDER: ICD-10-CM

## 2025-04-15 PROCEDURE — 77002 NEEDLE LOCALIZATION BY XRAY: CPT

## 2025-04-15 PROCEDURE — 23350 INJECTION FOR SHOULDER X-RAY: CPT

## 2025-04-15 PROCEDURE — 75571 CT HRT W/O DYE W/CA TEST: CPT

## 2025-04-15 PROCEDURE — 73201 CT UPPER EXTREMITY W/DYE: CPT

## 2025-04-15 RX ORDER — ROPIVACAINE HYDROCHLORIDE 2 MG/ML
10 INJECTION, SOLUTION EPIDURAL; INFILTRATION; PERINEURAL ONCE
Status: COMPLETED | OUTPATIENT
Start: 2025-04-15 | End: 2025-04-15

## 2025-04-15 RX ORDER — LIDOCAINE HYDROCHLORIDE 10 MG/ML
5 INJECTION, SOLUTION EPIDURAL; INFILTRATION; INTRACAUDAL; PERINEURAL
Status: COMPLETED | OUTPATIENT
Start: 2025-04-15 | End: 2025-04-15

## 2025-04-15 RX ORDER — SODIUM CHLORIDE 9 MG/ML
50 INJECTION INTRAVENOUS
Status: COMPLETED | OUTPATIENT
Start: 2025-04-15 | End: 2025-04-15

## 2025-04-15 RX ADMIN — IOHEXOL 3 ML: 300 INJECTION, SOLUTION INTRAVENOUS at 13:05

## 2025-04-15 RX ADMIN — LIDOCAINE HYDROCHLORIDE 4 ML: 10 INJECTION, SOLUTION EPIDURAL; INFILTRATION; INTRACAUDAL; PERINEURAL at 13:05

## 2025-04-15 RX ADMIN — ROPIVACAINE HYDROCHLORIDE 2 ML/HR: 2 INJECTION, SOLUTION EPIDURAL; INFILTRATION at 13:05

## 2025-04-15 RX ADMIN — SODIUM CHLORIDE 15 ML: 9 INJECTION, SOLUTION INTRAMUSCULAR; INTRAVENOUS; SUBCUTANEOUS at 13:05

## 2025-04-17 ENCOUNTER — RESULTS FOLLOW-UP (OUTPATIENT)
Dept: FAMILY MEDICINE CLINIC | Facility: CLINIC | Age: 45
End: 2025-04-17

## 2025-04-24 ENCOUNTER — TELEPHONE (OUTPATIENT)
Age: 45
End: 2025-04-24

## 2025-04-24 NOTE — TELEPHONE ENCOUNTER
Caller: Patient    Doctor: Dr. Velasquez    Reason for call: Patient has an appointment today and can not physically make it to the appointment // Patient wants to know if the result can be done another way // Please advise and thank you    Call back#: [phone]

## 2025-04-30 ENCOUNTER — EVALUATION (OUTPATIENT)
Dept: PHYSICAL THERAPY | Facility: CLINIC | Age: 45
End: 2025-04-30
Attending: ORTHOPAEDIC SURGERY
Payer: COMMERCIAL

## 2025-04-30 DIAGNOSIS — M75.42 SUBACROMIAL IMPINGEMENT OF LEFT SHOULDER: ICD-10-CM

## 2025-04-30 DIAGNOSIS — M24.812 INTERNAL DERANGEMENT OF LEFT SHOULDER: ICD-10-CM

## 2025-04-30 PROCEDURE — 97161 PT EVAL LOW COMPLEX 20 MIN: CPT | Performed by: PHYSICAL THERAPIST

## 2025-04-30 PROCEDURE — 97110 THERAPEUTIC EXERCISES: CPT | Performed by: PHYSICAL THERAPIST
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
## 2025-05-08 ENCOUNTER — OFFICE VISIT (OUTPATIENT)
Dept: OBGYN CLINIC | Facility: OTHER | Age: 45
End: 2025-05-08
Payer: COMMERCIAL

## 2025-05-08 VITALS — HEIGHT: 69 IN | WEIGHT: 184 LBS | BODY MASS INDEX: 27.25 KG/M2

## 2025-05-08 DIAGNOSIS — M75.82 TENDINITIS OF LEFT ROTATOR CUFF: Primary | ICD-10-CM

## 2025-05-08 DIAGNOSIS — S16.1XXD CERVICAL STRAIN, SUBSEQUENT ENCOUNTER: ICD-10-CM

## 2025-05-08 PROCEDURE — 99214 OFFICE O/P EST MOD 30 MIN: CPT | Performed by: ORTHOPAEDIC SURGERY

## 2025-05-08 NOTE — PROGRESS NOTES
"  Assessment & Plan  Tendinitis of left rotator cuff  Discussed with the patient that the CT arthrogram does not show a structural issue that would indicate the need for surgical intervention.   I did discuss that I feel her symptoms are multifactorial in nature to include the cervical spine.  I recommend the patient continue physical therapy at this time to include both the shoulder and the neck.  If symptoms fail to improve with adequate PT we can consider a diagnostic arthroscopy.   Orders:    Ambulatory Referral to Physical Therapy; Future    Cervical strain, subsequent encounter  Referral placed for spine and pain for evaluation and treatment.   Orders:    Ambulatory Referral to Physical Therapy; Future    Ambulatory referral to Spine & Pain Management; Future      Subjective:   Patient ID: Kennedy Blake is a 44 y.o. female      HPI  Patient presents today to discuss the findings of her CT arthrogram of the left shoulder.  She has only attended her initial evaluation from PT since the last visit and had appointment scheduled for next week.  Patient's therapist felt she has some cervical involvement as she reported to him left lateral sided neck pain that was not conveyed to us at the last visit.  Patient localized her pain to the anterior shoulder.  Shoulder pain is worse with activities and shoulder ROM.  Cervical pain is located left sided cervical and upper trap and worse with cervical ROM.       The following portions of the patient's history were reviewed and updated as appropriate: allergies, current medications, past family history, past medical history, past social history, past surgical history and problem list.    Review of Systems    Objective:  Ht 5' 9\" (1.753 m)   Wt 83.5 kg (184 lb)   BMI 27.17 kg/m²       Left Shoulder Exam     Range of Motion   External rotation:  80   Forward flexion:  170   Internal rotation 0 degrees:  Mid thoracic     Muscle Strength   Abduction: 5/5   Internal " rotation: 5/5   External rotation: 5/5     Tests   Martínez test: negative  Impingement: positive    Other   Erythema: absent  Sensation: normal  Pulse: present     Comments:    Cervical exam  Slight decreased ROM produces only cervical and upper trap pain.             Physical Exam  Vitals reviewed.   Constitutional:       Appearance: She is well-developed.   Eyes:      Pupils: Pupils are equal, round, and reactive to light.   Pulmonary:      Effort: Pulmonary effort is normal.      Breath sounds: Normal breath sounds.   Abdominal:      General: Abdomen is flat. There is no distension.   Skin:     General: Skin is warm and dry.   Neurological:      Mental Status: She is alert and oriented to person, place, and time.   Psychiatric:         Behavior: Behavior normal.         Thought Content: Thought content normal.         Judgment: Judgment normal.           I have personally reviewed pertinent films in PACS and my interpretation is as follows.    CT arthrogram left shoulder demonstrates no rotator cuff tear      Records Reviewed: Physical therapy notes     Scribe Attestation      I,:  Gertrudis Yang MA am acting as a scribe while in the presence of the attending physician.:       I,:  Neftali Velasquez MD personally performed the services described in this documentation    as scribed in my presence.:

## 2025-05-14 ENCOUNTER — APPOINTMENT (OUTPATIENT)
Age: 45
End: 2025-05-14
Attending: NURSE PRACTITIONER
Payer: COMMERCIAL

## 2025-05-14 ENCOUNTER — DOCUMENTATION (OUTPATIENT)
Dept: FAMILY MEDICINE CLINIC | Facility: CLINIC | Age: 45
End: 2025-05-14

## 2025-05-14 ENCOUNTER — TELEPHONE (OUTPATIENT)
Age: 45
End: 2025-05-14

## 2025-05-14 DIAGNOSIS — D50.9 IRON DEFICIENCY ANEMIA, UNSPECIFIED IRON DEFICIENCY ANEMIA TYPE: Primary | ICD-10-CM

## 2025-05-14 DIAGNOSIS — N91.2 AMENORRHEA: ICD-10-CM

## 2025-05-14 DIAGNOSIS — N95.1 PERIMENOPAUSAL VASOMOTOR SYMPTOMS: ICD-10-CM

## 2025-05-14 DIAGNOSIS — D50.9 IRON DEFICIENCY ANEMIA, UNSPECIFIED IRON DEFICIENCY ANEMIA TYPE: ICD-10-CM

## 2025-05-14 LAB
ALBUMIN SERPL BCG-MCNC: 4.2 G/DL (ref 3.5–5)
ALP SERPL-CCNC: 53 U/L (ref 34–104)
ALT SERPL W P-5'-P-CCNC: 17 U/L (ref 7–52)
ANION GAP SERPL CALCULATED.3IONS-SCNC: 8 MMOL/L (ref 4–13)
AST SERPL W P-5'-P-CCNC: 24 U/L (ref 13–39)
BASOPHILS # BLD AUTO: 0.05 THOUSANDS/ÂΜL (ref 0–0.1)
BASOPHILS NFR BLD AUTO: 1 % (ref 0–1)
BILIRUB SERPL-MCNC: 0.44 MG/DL (ref 0.2–1)
BUN SERPL-MCNC: 13 MG/DL (ref 5–25)
CALCIUM SERPL-MCNC: 8.8 MG/DL (ref 8.4–10.2)
CHLORIDE SERPL-SCNC: 103 MMOL/L (ref 96–108)
CO2 SERPL-SCNC: 25 MMOL/L (ref 21–32)
CORTIS SERPL-MCNC: 11.6 UG/DL
CREAT SERPL-MCNC: 0.62 MG/DL (ref 0.6–1.3)
EOSINOPHIL # BLD AUTO: 0.16 THOUSAND/ÂΜL (ref 0–0.61)
EOSINOPHIL NFR BLD AUTO: 4 % (ref 0–6)
ERYTHROCYTE [DISTWIDTH] IN BLOOD BY AUTOMATED COUNT: 12.4 % (ref 11.6–15.1)
ESTRADIOL SERPL-MCNC: 28.8 PG/ML
FERRITIN SERPL-MCNC: 24 NG/ML (ref 30–307)
FSH SERPL-ACNC: 27.4 MIU/ML
GFR SERPL CREATININE-BSD FRML MDRD: 109 ML/MIN/1.73SQ M
GLUCOSE P FAST SERPL-MCNC: 93 MG/DL (ref 65–99)
HCG SERPL QL: NEGATIVE
HCT VFR BLD AUTO: 36.1 % (ref 34.8–46.1)
HGB BLD-MCNC: 11.6 G/DL (ref 11.5–15.4)
IMM GRANULOCYTES # BLD AUTO: 0.01 THOUSAND/UL (ref 0–0.2)
IMM GRANULOCYTES NFR BLD AUTO: 0 % (ref 0–2)
IRON SATN MFR SERPL: 24 % (ref 15–50)
IRON SERPL-MCNC: 72 UG/DL (ref 50–212)
LYMPHOCYTES # BLD AUTO: 1.75 THOUSANDS/ÂΜL (ref 0.6–4.47)
LYMPHOCYTES NFR BLD AUTO: 40 % (ref 14–44)
MCH RBC QN AUTO: 30.4 PG (ref 26.8–34.3)
MCHC RBC AUTO-ENTMCNC: 32.1 G/DL (ref 31.4–37.4)
MCV RBC AUTO: 95 FL (ref 82–98)
MONOCYTES # BLD AUTO: 0.33 THOUSAND/ÂΜL (ref 0.17–1.22)
MONOCYTES NFR BLD AUTO: 8 % (ref 4–12)
NEUTROPHILS # BLD AUTO: 2.07 THOUSANDS/ÂΜL (ref 1.85–7.62)
NEUTS SEG NFR BLD AUTO: 47 % (ref 43–75)
NRBC BLD AUTO-RTO: 0 /100 WBCS
PLATELET # BLD AUTO: 307 THOUSANDS/UL (ref 149–390)
PMV BLD AUTO: 10.4 FL (ref 8.9–12.7)
POTASSIUM SERPL-SCNC: 4.4 MMOL/L (ref 3.5–5.3)
PROLACTIN SERPL-MCNC: 9.84 NG/ML (ref 3.34–26.72)
PROT SERPL-MCNC: 6.7 G/DL (ref 6.4–8.4)
RBC # BLD AUTO: 3.82 MILLION/UL (ref 3.81–5.12)
SODIUM SERPL-SCNC: 136 MMOL/L (ref 135–147)
TIBC SERPL-MCNC: 294 UG/DL (ref 250–450)
TRANSFERRIN SERPL-MCNC: 210 MG/DL (ref 203–362)
TSH SERPL DL<=0.05 MIU/L-ACNC: 1.09 UIU/ML (ref 0.45–4.5)
UIBC SERPL-MCNC: 222 UG/DL (ref 155–355)
WBC # BLD AUTO: 4.37 THOUSAND/UL (ref 4.31–10.16)

## 2025-05-14 PROCEDURE — 85025 COMPLETE CBC W/AUTO DIFF WBC: CPT

## 2025-05-14 PROCEDURE — 83001 ASSAY OF GONADOTROPIN (FSH): CPT

## 2025-05-14 PROCEDURE — 84703 CHORIONIC GONADOTROPIN ASSAY: CPT

## 2025-05-14 PROCEDURE — 80053 COMPREHEN METABOLIC PANEL: CPT

## 2025-05-14 PROCEDURE — 83540 ASSAY OF IRON: CPT

## 2025-05-14 PROCEDURE — 82728 ASSAY OF FERRITIN: CPT

## 2025-05-14 PROCEDURE — 82533 TOTAL CORTISOL: CPT

## 2025-05-14 PROCEDURE — 83550 IRON BINDING TEST: CPT

## 2025-05-14 PROCEDURE — 84443 ASSAY THYROID STIM HORMONE: CPT

## 2025-05-14 PROCEDURE — 36415 COLL VENOUS BLD VENIPUNCTURE: CPT

## 2025-05-14 PROCEDURE — 82670 ASSAY OF TOTAL ESTRADIOL: CPT

## 2025-05-14 PROCEDURE — 84146 ASSAY OF PROLACTIN: CPT

## 2025-05-14 NOTE — TELEPHONE ENCOUNTER
Patient is asking if the Iron Panel can be added to her labs. She states due to her taking iron supplements, she would like it checked. Please notify patient once placed

## 2025-05-15 ENCOUNTER — RESULTS FOLLOW-UP (OUTPATIENT)
Dept: FAMILY MEDICINE CLINIC | Facility: CLINIC | Age: 45
End: 2025-05-15

## 2025-05-15 NOTE — TELEPHONE ENCOUNTER
Patient called wanting to know if it is possible for you to add in a B12 and heavy metal testing script? She is hoping that the lab can use the blood that they took yesterday to test for this.

## 2025-05-23 NOTE — TELEPHONE ENCOUNTER
Patient called back requesting to speak to a manager in regards to this matter. She is upset that she hasn't gotten a response back yet.

## 2025-05-29 ENCOUNTER — OFFICE VISIT (OUTPATIENT)
Dept: PHYSICAL THERAPY | Facility: CLINIC | Age: 45
End: 2025-05-29
Attending: ORTHOPAEDIC SURGERY
Payer: COMMERCIAL

## 2025-05-29 ENCOUNTER — E-CONSULT (OUTPATIENT)
Age: 45
End: 2025-05-29
Payer: COMMERCIAL

## 2025-05-29 ENCOUNTER — TELEMEDICINE (OUTPATIENT)
Dept: FAMILY MEDICINE CLINIC | Facility: CLINIC | Age: 45
End: 2025-05-29
Payer: COMMERCIAL

## 2025-05-29 DIAGNOSIS — R79.0 LOW SERUM FERRITIN LEVEL: Primary | ICD-10-CM

## 2025-05-29 DIAGNOSIS — D64.9 ANEMIA, UNSPECIFIED TYPE: Primary | ICD-10-CM

## 2025-05-29 DIAGNOSIS — M24.812 INTERNAL DERANGEMENT OF LEFT SHOULDER: ICD-10-CM

## 2025-05-29 DIAGNOSIS — M75.42 SUBACROMIAL IMPINGEMENT OF LEFT SHOULDER: Primary | ICD-10-CM

## 2025-05-29 PROCEDURE — 99214 OFFICE O/P EST MOD 30 MIN: CPT | Performed by: NURSE PRACTITIONER

## 2025-05-29 PROCEDURE — 99448 NTRPROF PH1/NTRNET/EHR 21-30: CPT | Performed by: NURSE PRACTITIONER

## 2025-05-29 PROCEDURE — 97110 THERAPEUTIC EXERCISES: CPT | Performed by: PHYSICAL THERAPIST

## 2025-05-29 PROCEDURE — 97140 MANUAL THERAPY 1/> REGIONS: CPT | Performed by: PHYSICAL THERAPIST

## 2025-05-29 PROCEDURE — 97112 NEUROMUSCULAR REEDUCATION: CPT | Performed by: PHYSICAL THERAPIST

## 2025-05-29 NOTE — PROGRESS NOTES
Daily Note     Today's date: 2025  Patient name: Kennedy Blake  : 1980  MRN: [id number]  Referring provider: Neftali Velasquez*  Dx:   Encounter Diagnosis     ICD-10-CM    1. Subacromial impingement of left shoulder  M75.42       2. Internal derangement of left shoulder  M24.812                      Subjective: Pt reports some improvement since IE. No real pain in the shoulder blade and not much in the lateral shoulder. Continues to have neck and anterior shoulder symptoms.       Objective: See treatment diary below      Assessment: Tolerated treatment well. Cervical mobilization significantly improved symptoms. Improvement again with neurodynamics. Updated Hep. May still have contribution from shoulder, and therefore this will continue to be explored if needed in the coming visits. Patient would benefit from continued PT      Plan: Continue per plan of care.  Progress treatment as tolerated.       Precautions: N/A  POC expires Unit limit Auth Expiration date PT/OT/ST + Visit Limit?   25 N/a pending BOMN                           Visit/Unit Tracking  AUTH Status:  Date               pending Used 1               Remaining                        Manuals            L cervical upglide BRENDA gd 4 C5            CPA/UPA  BRENDA gd 4 C5                                     Neuro Re-Ed             Median nerve slider  BRENDA + HEP                                                                                         Ther Ex             Cervical upglide self mob 10x HEP                                                                                          Pt edu BRENDA BRENDA           Ther Activity                                       Gait Training                                       Modalities

## 2025-05-29 NOTE — TELEPHONE ENCOUNTER
Patient called because she still had not heard from anyone.  One of her questions (about B12) was not addressed.  She requested to be seen instead.  She is on with Batsheva at 11:40 for a virtual visit.

## 2025-05-29 NOTE — PROGRESS NOTES
Virtual Regular Visit  Name: Kennedy Blake      : 1980      MRN: [id number]  Encounter Provider: FANI Fitzgerald  Encounter Date: 2025   Encounter department: [facility] [address]  :  Assessment & Plan  Anemia, unspecified type  Patient has been taking ferrous sulfate with vitamin C twice daily for 4 months.  Her ferritin remains low.  She is requesting additional lab work and a referral referral to heme-onc.  To obtain labs.  Will call with results.  To continue iron supplement.  Provided referral to heme-onc.    Orders:    Heavy metals, blood; Future    Folic Acid; Future    Methylmalonic acid, serum; Future    Retic Count; Future    Lactate Dehydrogenase (LDH); Future    Haptoglobin; Future    Vitamin B12; Future    Folate; Future    AMB E-CONSULT TO HEMATOLOGY    Hemoglobin A1C; Future    UA (URINE) with reflex to Scope; Future    Zinc; Future    Anemia, unspecified type               History of Present Illness     Jere presents for a virtual visit to review recent lab work.      Review of Systems   Constitutional:  Positive for fatigue.   HENT: Negative.     Eyes: Negative.    Respiratory: Negative.     Cardiovascular: Negative.    Gastrointestinal: Negative.    Endocrine: Positive for heat intolerance.   Genitourinary: Negative.    Musculoskeletal: Negative.    Skin: Negative.    Allergic/Immunologic: Negative.    Neurological: Negative.    Hematological: Negative.    Psychiatric/Behavioral: Negative.         Objective   There were no vitals taken for this visit.    Physical Exam  Constitutional:       General: She is not in acute distress.     Appearance: Normal appearance. She is not ill-appearing, toxic-appearing or diaphoretic.   HENT:      Head: Normocephalic and atraumatic.     Eyes:      Conjunctiva/sclera: Conjunctivae normal.     Pulmonary:      Effort: Pulmonary effort is normal.     Musculoskeletal:      Cervical back: Neck supple.      Neurological:      General: No focal deficit present.      Mental Status: She is alert and oriented to person, place, and time.     Psychiatric:         Mood and Affect: Mood normal.         Behavior: Behavior normal.         Thought Content: Thought content normal.         Judgment: Judgment normal.         Administrative Statements   Encounter provider FANI Fitzgerald    The Patient is located at Home and in the following state in which I hold an active license [place].    The patient was identified by name and date of birth. Kennedy Blake was informed that this is a telemedicine visit and that the visit is being conducted through the Epic Embedded platform. She agrees to proceed..  My office door was closed. No one else was in the room.  She acknowledged consent and understanding of privacy and security of the video platform. The patient has agreed to participate and understands they can discontinue the visit at any time.    I have spent a total time of 21 minutes in caring for this patient on the day of the visit/encounter including Prognosis, Risks and benefits of tx options, Instructions for management, Patient and family education, Importance of tx compliance, Risk factor reductions, Impressions, Counseling / Coordination of care, Documenting in the medical record, Reviewing/placing orders in the medical record (including tests, medications, and/or procedures), and Obtaining or reviewing history  , not including the time spent for establishing the audio/video connection.

## 2025-05-29 NOTE — ASSESSMENT & PLAN NOTE
Patient has been taking ferrous sulfate with vitamin C twice daily for 4 months.  Her ferritin remains low.  She is requesting additional lab work and a referral referral to heme-onc.  To obtain labs.  Will call with results.  To continue iron supplement.  Provided referral to heme-onc.    Orders:    Heavy metals, blood; Future    Folic Acid; Future    Methylmalonic acid, serum; Future    Retic Count; Future    Lactate Dehydrogenase (LDH); Future    Haptoglobin; Future    Vitamin B12; Future    Folate; Future    AMB E-CONSULT TO HEMATOLOGY    Hemoglobin A1C; Future    UA (URINE) with reflex to Scope; Future    Zinc; Future

## 2025-05-29 NOTE — PROGRESS NOTES
"E-Consult  Kennedy Blake 44 y.o. female MRN: [id number]  Encounter Date: 05/29/25      Reason for Consult / Principal Problem: Anemia    Consulting Provider: FANI Alvares    Requesting Provider: Nadine Arthur CRNP       ASSESSMENT:  Patient is a 44-year-old female seen by PCP for anemia.  She has been taking ferrous sulfate with vitamin C twice daily for the last 4 months.  Recent blood work reviewed.    CBC with differential is completely normal.  No evidence of anemia.  Hgb in a low normal range at 11.6.  Serum iron, iron saturation normal  Serum ferritin low at 24    Per chart review, additional labs were ordered today including LDH, haptoglobin, retic count.  Unclear why LDH haptoglobin and reticulocyte count were ordered as these labs are typically ordered to assess for hemolysis. There is no indication of this on blood work. She has a normal hemoglobin, normal MCV,  normal T Bili.     RECOMMENDATIONS:  Patient has low serum ferritin which correlates with iron deficiency.  Patients may be iron deficient without being anemic.  Patient has no evidence of anemia on recent labs.  No indication to order workup for hemolysis or extensive workup for anemia.  No harm in checking B12, folate levels if she is experiencing symptoms of fatigue.     Recommend trial of parenteral iron replacement since she has been taking oral iron diligently over the last 4 months and serum ferritin remains low.  Would recommend utilizing PCP for iron Venofer plan that has been created within [employer]    The simplest way to do this, would be to place an order for [facility] anemia panel.  This will give you several options to choose from.  Select iron replacement therapy.  This will take you to the therapy plans within [facility] and allow you to search for \" PCP Venofer\" this is an iron infusion plan specifically built for the PCP to order iron infusions.    I would recommend repeating CBC and iron panel 3 months after your patient " completes iron infusions to assess how she responded to parenteral iron replacement.      Total time spent  21-30 minutes, >50% of the total time devoted to medical consultative verbal/EMR discussion between providers. Written report will be generated in the EMR. .

## 2025-05-30 ENCOUNTER — TELEPHONE (OUTPATIENT)
Dept: ADMINISTRATIVE | Facility: OTHER | Age: 45
End: 2025-05-30

## 2025-05-30 NOTE — TELEPHONE ENCOUNTER
Upon review of the In Basket request we were able to locate, review, and update the patient chart as requested for Pap Smear (HPV) aka Cervical Cancer Screening.    Any additional questions or concerns should be emailed to the Practice Liaisons via the appropriate education email address, please do not reply via In Basket.    Thank you  Cosme Juarez MA   [facility]

## 2025-05-30 NOTE — TELEPHONE ENCOUNTER
----- Message from Sammie GURROLA sent at 5/29/2025  2:24 PM EDT -----  Regarding: Care gap request.  05/29/25 2:24 PMHello, our patient listed above has had Pap Smear (HPV) aka Cervical Cancer Screening completed/performed. Please assist in updating the patient chart by pulling the Care Everywhere (CE) document. The date of service is 12/20/23 //Thank you,Sammie Tapia [address] [place] [address]

## 2025-06-02 DIAGNOSIS — D64.9 ANEMIA, UNSPECIFIED TYPE: ICD-10-CM

## 2025-06-02 DIAGNOSIS — R79.0 LOW SERUM FERRITIN LEVEL: Primary | ICD-10-CM

## 2025-06-02 RX ORDER — SODIUM CHLORIDE 9 MG/ML
20 INJECTION, SOLUTION INTRAVENOUS ONCE
OUTPATIENT
Start: 2025-06-20

## 2025-06-03 ENCOUNTER — APPOINTMENT (OUTPATIENT)
Age: 45
End: 2025-06-03
Payer: COMMERCIAL

## 2025-06-03 DIAGNOSIS — D64.9 ANEMIA, UNSPECIFIED TYPE: ICD-10-CM

## 2025-06-03 LAB
BACTERIA UR QL AUTO: ABNORMAL /HPF
BILIRUB UR QL STRIP: NEGATIVE
CLARITY UR: CLEAR
COLOR UR: ABNORMAL
FOLATE SERPL-MCNC: >22.3 NG/ML
GLUCOSE UR STRIP-MCNC: NEGATIVE MG/DL
HGB UR QL STRIP.AUTO: ABNORMAL
KETONES UR STRIP-MCNC: NEGATIVE MG/DL
LDH SERPL-CCNC: 183 U/L (ref 140–271)
LEUKOCYTE ESTERASE UR QL STRIP: NEGATIVE
NITRITE UR QL STRIP: NEGATIVE
NON-SQ EPI CELLS URNS QL MICRO: ABNORMAL /HPF
PH UR STRIP.AUTO: 6 [PH]
PROT UR STRIP-MCNC: NEGATIVE MG/DL
RBC #/AREA URNS AUTO: ABNORMAL /HPF
RETICS # AUTO: NORMAL 10*3/UL (ref 14097–95744)
RETICS # CALC: 0.82 % (ref 0.37–1.87)
SP GR UR STRIP.AUTO: 1.01 (ref 1–1.03)
UROBILINOGEN UR STRIP-ACNC: <2 MG/DL
VIT B12 SERPL-MCNC: 571 PG/ML (ref 180–914)
WBC #/AREA URNS AUTO: ABNORMAL /HPF

## 2025-06-03 PROCEDURE — 83010 ASSAY OF HAPTOGLOBIN QUANT: CPT

## 2025-06-03 PROCEDURE — 84630 ASSAY OF ZINC: CPT

## 2025-06-03 PROCEDURE — 81001 URINALYSIS AUTO W/SCOPE: CPT

## 2025-06-03 PROCEDURE — 83615 LACTATE (LD) (LDH) ENZYME: CPT

## 2025-06-03 PROCEDURE — 85045 AUTOMATED RETICULOCYTE COUNT: CPT

## 2025-06-03 PROCEDURE — 83918 ORGANIC ACIDS TOTAL QUANT: CPT

## 2025-06-03 PROCEDURE — 36415 COLL VENOUS BLD VENIPUNCTURE: CPT

## 2025-06-03 PROCEDURE — 83655 ASSAY OF LEAD: CPT

## 2025-06-03 PROCEDURE — 83036 HEMOGLOBIN GLYCOSYLATED A1C: CPT

## 2025-06-03 PROCEDURE — 82746 ASSAY OF FOLIC ACID SERUM: CPT

## 2025-06-03 PROCEDURE — 82175 ASSAY OF ARSENIC: CPT

## 2025-06-03 PROCEDURE — 80061 LIPID PANEL: CPT

## 2025-06-03 PROCEDURE — 83825 ASSAY OF MERCURY: CPT

## 2025-06-03 PROCEDURE — 82607 VITAMIN B-12: CPT

## 2025-06-04 ENCOUNTER — TELEPHONE (OUTPATIENT)
Age: 45
End: 2025-06-04

## 2025-06-04 LAB
EST. AVERAGE GLUCOSE BLD GHB EST-MCNC: 117 MG/DL
HBA1C MFR BLD: 5.7 %

## 2025-06-05 ENCOUNTER — TELEPHONE (OUTPATIENT)
Age: 45
End: 2025-06-05

## 2025-06-05 ENCOUNTER — RESULTS FOLLOW-UP (OUTPATIENT)
Dept: FAMILY MEDICINE CLINIC | Facility: CLINIC | Age: 45
End: 2025-06-05

## 2025-06-05 DIAGNOSIS — Z13.6 SCREENING FOR CARDIOVASCULAR CONDITION: Primary | ICD-10-CM

## 2025-06-05 LAB
CHOLEST SERPL-MCNC: 147 MG/DL (ref ?–200)
HAPTOGLOB SERPL-MCNC: 81 MG/DL (ref 42–296)
HDLC SERPL-MCNC: 41 MG/DL
LDLC SERPL CALC-MCNC: 73 MG/DL (ref 0–100)
TRIGL SERPL-MCNC: 164 MG/DL (ref ?–150)

## 2025-06-05 NOTE — TELEPHONE ENCOUNTER
RN placed a call to patient with update. Patient verbalized an understanding. No further questions.

## 2025-06-05 NOTE — TELEPHONE ENCOUNTER
Patient calling to ask if she should be taking estradiol vaginal cream up to appointment scheduled in September to have accurate discussion on symptoms/results of any lab works provider may be ordering; or if OK to continue using it until appointment. Patient also would like provider to know that results of recent hormonal testing are available in chart for provider to review for further understanding of what is going on. RN advised will discuss with provider, and staff will return a call with update. No further questions.

## 2025-06-06 ENCOUNTER — OFFICE VISIT (OUTPATIENT)
Dept: PHYSICAL THERAPY | Facility: CLINIC | Age: 45
End: 2025-06-06
Attending: ORTHOPAEDIC SURGERY
Payer: COMMERCIAL

## 2025-06-06 DIAGNOSIS — M24.812 INTERNAL DERANGEMENT OF LEFT SHOULDER: ICD-10-CM

## 2025-06-06 DIAGNOSIS — M75.42 SUBACROMIAL IMPINGEMENT OF LEFT SHOULDER: Primary | ICD-10-CM

## 2025-06-06 LAB — ZINC SERPL-MCNC: 55 UG/DL (ref 44–115)

## 2025-06-06 PROCEDURE — 97110 THERAPEUTIC EXERCISES: CPT | Performed by: PHYSICAL THERAPIST

## 2025-06-06 PROCEDURE — 97140 MANUAL THERAPY 1/> REGIONS: CPT | Performed by: PHYSICAL THERAPIST

## 2025-06-06 NOTE — PROGRESS NOTES
Daily Note     Today's date: 2025  Patient name: Kennedy Blake  : 1980  MRN: [id number]  Referring provider: Neftali Velasquez*  Dx:   Encounter Diagnosis     ICD-10-CM    1. Subacromial impingement of left shoulder  M75.42       2. Internal derangement of left shoulder  M24.812                      Subjective: Pt reports that she hasn't noticed much change in symptoms from last session.       Objective: See treatment diary below      Assessment: Tolerated treatment well. Decided to look directly at the shoulder first today instead of the cervical spine. SSMP noted resolution of pain with shoulder forward reach with prepositioning the scapula in elevation. Following, improvement in symptoms. Followed up with shoulder shrug. Continued to improve symptoms. Cervical mobilization continues to help with pain as well. Updated Hep to only focus on upper trap. Patient would benefit from continued PT      Plan: Continue per plan of care.  Progress treatment as tolerated.       Precautions: N/A  POC expires Unit limit Auth Expiration date PT/OT/ST + Visit Limit?   25 N/a pending BOMN                           Visit/Unit Tracking  AUTH Status:  Date               pending Used 1               Remaining                        Manuals           L cervical upglide BRENDA gd 4 C5            CPA/UPA  BRENDA gd 4 C5 BRENDA gd 4 C5                                    Neuro Re-Ed             Median nerve slider  BRENDA + HEP                                                                                         Ther Ex             Cervical upglide self mob 10x HEP            Shoulder flexion SSMP   Scap elevation 2x10          Shoulder shrug   5# 2x15                                                              Pt edu BRENDA BRENDA BRENDA          Ther Activity                                       Gait Training                                       Modalities

## 2025-06-08 DIAGNOSIS — U07.1 COVID-19: ICD-10-CM

## 2025-06-08 LAB — METHYLMALONATE SERPL-SCNC: 109 NMOL/L (ref 0–378)

## 2025-06-09 LAB
ARSENIC BLD-MCNC: <1 UG/L (ref 0–9)
LEAD BLD-MCNC: <1 UG/DL (ref 0–3.4)
MERCURY BLD-MCNC: <1 UG/L (ref 0–14.9)

## 2025-06-09 RX ORDER — FLUTICASONE PROPIONATE 50 MCG
SPRAY, SUSPENSION (ML) NASAL
Qty: 32 ML | Refills: 0 | Status: SHIPPED | OUTPATIENT
Start: 2025-06-09

## 2025-06-10 ENCOUNTER — OFFICE VISIT (OUTPATIENT)
Dept: PHYSICAL THERAPY | Facility: CLINIC | Age: 45
End: 2025-06-10
Attending: ORTHOPAEDIC SURGERY
Payer: COMMERCIAL

## 2025-06-10 DIAGNOSIS — M75.42 SUBACROMIAL IMPINGEMENT OF LEFT SHOULDER: Primary | ICD-10-CM

## 2025-06-10 DIAGNOSIS — M24.812 INTERNAL DERANGEMENT OF LEFT SHOULDER: ICD-10-CM

## 2025-06-10 PROCEDURE — 97110 THERAPEUTIC EXERCISES: CPT | Performed by: PHYSICAL THERAPIST

## 2025-06-10 PROCEDURE — 97140 MANUAL THERAPY 1/> REGIONS: CPT | Performed by: PHYSICAL THERAPIST

## 2025-06-10 NOTE — PROGRESS NOTES
Daily Note     Today's date: 6/10/2025  Patient name: Kennedy Blake  : 1980  MRN: [id number]  Referring provider: Neftali Velasquez*  Dx:   Encounter Diagnosis     ICD-10-CM    1. Subacromial impingement of left shoulder  M75.42       2. Internal derangement of left shoulder  M24.812                      Subjective: Pt reports improvement in her shoulder. Able to do camel exercise in yoga without pain this past weekend.       Objective: See treatment diary below      Assessment: Tolerated treatment well. Pt did well with cervical mobs and trap based exercise. Trialed moving to lateral raises to work the trap and deltoid together, worsened symptoms. Went back to the trap isolated exercise, improved again. Updated HEP. Patient would benefit from continued PT      Plan: Continue per plan of care.  Progress treatment as tolerated.       Precautions: N/A  POC expires Unit limit Auth Expiration date PT/OT/ST + Visit Limit?   25 N/a pending BOMN                           Visit/Unit Tracking  AUTH Status:  Date               pending Used 1               Remaining                        Manuals 4/30 5/29 6/6 6/10         L cervical upglide BRENDA gd 4 C5            CPA/UPA  BRENDA gd 4 C5 BRENDA gd 4 C5 BRENDA gd 4 C6         Prone upper thoracic HVLA    BRENDA                      Neuro Re-Ed             Median nerve slider  BRENDA + HEP                                                                                         Ther Ex             Cervical upglide self mob 10x HEP            Shoulder flexion SSMP   Scap elevation 2x10          Shoulder shrug   5# 2x15 5# 4x15         DB lateral ROM    Full ROM 2x10 1,2#, half ROM 3# 2x15,6                                                Pt edu BRENDA BRENDA BRENDA BRENDA         Ther Activity                                       Gait Training                                       Modalities

## 2025-06-12 ENCOUNTER — TELEMEDICINE (OUTPATIENT)
Dept: FAMILY MEDICINE CLINIC | Facility: CLINIC | Age: 45
End: 2025-06-12
Payer: COMMERCIAL

## 2025-06-12 DIAGNOSIS — R73.01 IMPAIRED FASTING GLUCOSE: ICD-10-CM

## 2025-06-12 DIAGNOSIS — D50.9 IRON DEFICIENCY ANEMIA, UNSPECIFIED IRON DEFICIENCY ANEMIA TYPE: ICD-10-CM

## 2025-06-12 DIAGNOSIS — F32.1 CURRENT MODERATE EPISODE OF MAJOR DEPRESSIVE DISORDER, UNSPECIFIED WHETHER RECURRENT (HCC): ICD-10-CM

## 2025-06-12 DIAGNOSIS — R31.29 MICROSCOPIC HEMATURIA: Primary | ICD-10-CM

## 2025-06-12 PROCEDURE — 99214 OFFICE O/P EST MOD 30 MIN: CPT | Performed by: NURSE PRACTITIONER

## 2025-06-12 NOTE — ASSESSMENT & PLAN NOTE
Stable.  To continue sertraline 25 mg.  To proceed with having vitamin D and magnesium checked as requested.  Will call with results.    Orders:    Vitamin D 25 hydroxy; Future    Magnesium; Future

## 2025-06-12 NOTE — ASSESSMENT & PLAN NOTE
Recent ferritin low.  Patient has been taking oral iron religiously.  Recommended IV iron infusions as per hematology.  Will proceed.  To obtain fit test as well to evaluate for blood loss via GI tract.  Will call with results.      Orders:    iFOBT (FIT); Future

## 2025-06-12 NOTE — ASSESSMENT & PLAN NOTE
A1c 5.7%.  Counseled on the importance of consuming low-carb diet and engaging in daily physical activity.  Will continue to monitor.

## 2025-06-12 NOTE — PROGRESS NOTES
Virtual Regular Visit  Name: Kennedy Blake      : 1980      MRN: [id number]  Encounter Provider: FANI Fitzgerald  Encounter Date: 2025   Encounter department: [facility] [address]  :  Assessment & Plan  Microscopic hematuria  Presence of microscopic hematuria on recent urinalysis.  Will repeat urinalysis in 4 weeks.  Patient is r requesting to be referred to urology, referral placed as requested.  Orders:    UA (URINE) with reflex to Scope; Future    Ambulatory Referral to Urology; Future    Iron deficiency anemia, unspecified iron deficiency anemia type  Recent ferritin low.  Patient has been taking oral iron religiously.  Recommended IV iron infusions as per hematology.  Will proceed.  To obtain fit test as well to evaluate for blood loss via GI tract.  Will call with results.      Orders:    iFOBT (FIT); Future    Current moderate episode of major depressive disorder, unspecified whether recurrent (HCC)  Stable.  To continue sertraline 25 mg.  To proceed with having vitamin D and magnesium checked as requested.  Will call with results.    Orders:    Vitamin D 25 hydroxy; Future    Magnesium; Future    Impaired fasting glucose  A1c 5.7%.  Counseled on the importance of consuming low-carb diet and engaging in daily physical activity.  Will continue to monitor.       Microscopic hematuria         Iron deficiency anemia, unspecified iron deficiency anemia type           Current moderate episode of major depressive disorder, unspecified whether recurrent (HCC)               History of Present Illness     Jere presents for a follow-up via virtual visit to review lab work.      Review of Systems   Constitutional:  Positive for fatigue.   HENT: Negative.     Eyes: Negative.    Respiratory: Negative.     Cardiovascular: Negative.    Gastrointestinal: Negative.    Endocrine: Negative.    Genitourinary: Negative.    Musculoskeletal: Negative.    Skin: Negative.     Allergic/Immunologic: Negative.    Neurological: Negative.    Hematological: Negative.    Psychiatric/Behavioral:  Positive for dysphoric mood.        Objective   There were no vitals taken for this visit.    Physical Exam  Constitutional:       General: She is not in acute distress.     Appearance: Normal appearance. She is not ill-appearing, toxic-appearing or diaphoretic.   HENT:      Head: Normocephalic and atraumatic.     Eyes:      Conjunctiva/sclera: Conjunctivae normal.     Pulmonary:      Effort: Pulmonary effort is normal.     Musculoskeletal:      Cervical back: Neck supple.     Neurological:      General: No focal deficit present.      Mental Status: She is alert and oriented to person, place, and time.     Psychiatric:         Mood and Affect: Mood normal.         Behavior: Behavior normal.         Thought Content: Thought content normal.         Judgment: Judgment normal.       Results for orders placed or performed in visit on 06/03/25   Hemoglobin A1C   Result Value Ref Range    Hemoglobin A1C 5.7 (H) Normal 4.0-5.6%; PreDiabetic 5.7-6.4%; Diabetic >=6.5%; Glycemic control for adults with diabetes <7.0% %     mg/dl   Zinc   Result Value Ref Range    Zinc 55 44 - 115 ug/dL   UA (URINE) with reflex to Scope   Result Value Ref Range    Color, UA Light Yellow     Clarity, UA Clear     Specific Gravity, UA 1.009 1.003 - 1.030    pH, UA 6.0 4.5, 5.0, 5.5, 6.0, 6.5, 7.0, 7.5, 8.0    Leukocytes, UA Negative Negative    Nitrite, UA Negative Negative    Protein, UA Negative Negative mg/dl    Glucose, UA Negative Negative mg/dl    Ketones, UA Negative Negative mg/dl    Urobilinogen, UA <2.0 <2.0 mg/dl mg/dl    Bilirubin, UA Negative Negative    Occult Blood, UA Trace (A) Negative   Folate   Result Value Ref Range    Folate >22.3 >5.9 ng/mL   Vitamin B12   Result Value Ref Range    Vitamin B-12 571 180 - 914 pg/mL   Haptoglobin   Result Value Ref Range    Haptoglobin 81 42 - 296 mg/dL   Lactate  Dehydrogenase (LDH)   Result Value Ref Range     140 - 271 U/L   Retic Count   Result Value Ref Range    Retic Ct Abs 29,400 14,097 - 95,744    Retic Ct Pct 0.82 0.37 - 1.87 %   Methylmalonic acid, serum   Result Value Ref Range    Methylmalonic Acid, S 109 0 - 378 nmol/L   Heavy metals, blood   Result Value Ref Range    Arsenic <1 0 - 9 ug/L    Mercury <1.0 0.0 - 14.9 ug/L    Lead Blood <1.0 0.0 - 3.4 ug/dL   Urine Microscopic   Result Value Ref Range    RBC, UA 2-4 (A) None Seen, 1-2 /hpf    WBC, UA 1-2 None Seen, 1-2 /hpf    Epithelial Cells None Seen None Seen, Occasional /hpf    Bacteria, UA None Seen None Seen, Occasional /hpf   Lipid Panel with Direct LDL reflex   Result Value Ref Range    Cholesterol 147 See Comment mg/dL    Triglycerides 164 (H) See Comment mg/dL    HDL, Direct 41 (L) >=50 mg/dL    LDL Calculated 73 0 - 100 mg/dL        Administrative Statements   Encounter provider FANI Fitzgerald    The Patient is located at Home and in the following state in which I hold an active license [place].    The patient was identified by name and date of birth. Kennedy Blake was informed that this is a telemedicine visit and that the visit is being conducted through the Epic Embedded platform. She agrees to proceed..  My office door was closed. No one else was in the room.  She acknowledged consent and understanding of privacy and security of the video platform. The patient has agreed to participate and understands they can discontinue the visit at any time.    I have spent a total time of 20 minutes in caring for this patient on the day of the visit/encounter including Diagnostic results, Prognosis, Risks and benefits of tx options, Instructions for management, Patient and family education, Importance of tx compliance, Risk factor reductions, Impressions, Counseling / Coordination of care, Documenting in the medical record, Reviewing/placing orders in the medical record (including tests, medications,  and/or procedures), and Obtaining or reviewing history  , not including the time spent for establishing the audio/video connection.     No

## 2025-06-13 ENCOUNTER — CONSULT (OUTPATIENT)
Dept: PAIN MEDICINE | Facility: CLINIC | Age: 45
End: 2025-06-13
Attending: ORTHOPAEDIC SURGERY
Payer: COMMERCIAL

## 2025-06-13 VITALS — HEIGHT: 69 IN | BODY MASS INDEX: 27.25 KG/M2 | WEIGHT: 184 LBS

## 2025-06-13 DIAGNOSIS — M79.18 MYOFASCIAL PAIN SYNDROME: ICD-10-CM

## 2025-06-13 DIAGNOSIS — M54.2 NECK PAIN ON LEFT SIDE: Primary | ICD-10-CM

## 2025-06-13 PROCEDURE — 99204 OFFICE O/P NEW MOD 45 MIN: CPT | Performed by: STUDENT IN AN ORGANIZED HEALTH CARE EDUCATION/TRAINING PROGRAM

## 2025-06-13 NOTE — ASSESSMENT & PLAN NOTE
44-year-old female with chronic neck pain rating into the left shoulder region.  Does not radiate down the left arm.  She is neurologically intact on exam today.  There is mild tenderness in the left trapezius musculature indicative of a component of myofascial pain.  Does not have any pertinent imaging of the cervical spine.  Will start with x-ray of the cervical spine.  Will also provide referral to add PT for neck pain.  She is currently in PT for left shoulder impingement.  She has had left cervical medial bursa injection with some relief.  However, she continues with ongoing neck pain.  Ultimately, symptoms are likely musculoskeletal in nature.  Will order TENS unit for the patient.  Also provided information regarding trigger point injection which she will call to schedule if she would like to proceed with.  For now I advised that she continue with PT and return for reevaluation.  We will reach out to patient with x-ray results if there are any significant findings.    Orders:    XR spine cervical complete 4 or 5 vw non injury; Future    Durable Medical Equipment    Ambulatory referral to Physical Therapy; Future

## 2025-06-13 NOTE — PATIENT INSTRUCTIONS
Patient Education     Trigger Point Injection   Why is this procedure done?   A trigger point is a very painful area in a muscle. You may have a lump or feel a knot. The trigger point causes pain right where it is, or in the area around the trigger point. You may have less movement in your neck, arm, or leg if you have a trigger point. Your pain may increase if someone presses on this area. You may have pain far away from the trigger point. You may even have pain when you are not moving.   This kind of injection is used to help lower pain. When your pain is less, you may be able to move more and do more physical therapy. The goal is to break the pain cycle at this very painful spot.  What will the results be?   Less pain  Less swelling in the nerves  Better movement  What happens before the procedure?   Your doctor will take your history and do an exam. Talk to the doctor about:  All the drugs you are taking. Be sure to include all prescription and over-the-counter (OTC) drugs, and herbal supplements. Tell the doctor about any drug allergy. Bring a list of drugs you take with you.  If you have high blood sugar or diabetes. Your drugs may need to be changed.  Any bleeding problems. Be sure to tell your doctor if you are taking any drugs that may cause bleeding. Some of these are warfarin, rivaroxaban, apixaban, ticagrelor, clopidogrel, ketorolac, ibuprofen, naproxen, or aspirin. Certain vitamins and herbs, such as garlic and fish oil, may also add to the risk for bleeding. You may need to stop these drugs as well. Talk to your doctor about them.  If you need to stop eating or drinking before your procedure.  You will not be allowed to drive right away after the procedure. Ask a family member or a friend to drive you home.  What happens during the procedure?   The painful area is cleaned with a special soap. Your doctor may give you a drug to numb the painful area. Once the point of the pain is located, your doctor  will inject the drug into this trigger point. The whole procedure will take only a few minutes.  What happens after the procedure?   You can go home after the procedure.  You will feel numb in the area where the shot is given.  You should feel less pain right away.  What care is needed at home?   Ask your doctor what you need to do when you go home. Make sure you ask questions if you do not understand what the doctor says. This way you will know what you need to do.   Your doctor or physical therapist may give you some muscle stretching exercises to do.  Apply ice to the injection site if it is sore. Place an ice pack or a bag of frozen peas wrapped in a towel over the painful part. Never put ice right on the skin. Do not leave the ice on more than 10 to 15 minutes at a time.  What follow-up care is needed?   Your doctor may ask you to make visits to the office to check on your progress. Be sure to keep these visits.  Your doctor may ask you to see a physical therapist for exercises. Be sure to keep these visits.  Your doctor may ask you to do exercises for the area that was affected by the trigger point. Do the exercises as directed at home.  What problems could happen?   Infection  Blood clot  Lung collapse or trouble breathing if the injection was in the chest or back  Muscle pain does not go away  When do I need to call the doctor?   Signs of infection. These include a fever of 100.4°F (38°C) or higher, chills.  Signs of infection at shot site. These include swelling, redness, warmth around the wound; too much pain when touched; yellowish, greenish, or bloody discharge.  Shortness of breath or chest pain. If you have this sign, go to the ER right away.  Numbness, tingling, or weakness where the shot was given  Last Reviewed Date   2020-10-13  Consumer Information Use and Disclaimer   This generalized information is a limited summary of diagnosis, treatment, and/or medication information. It is not meant to be  comprehensive and should be used as a tool to help the user understand and/or assess potential diagnostic and treatment options. It does NOT include all information about conditions, treatments, medications, side effects, or risks that may apply to a specific patient. It is not intended to be medical advice or a substitute for the medical advice, diagnosis, or treatment of a health care provider based on the health care provider's examination and assessment of a patient’s specific and unique circumstances. Patients must speak with a health care provider for complete information about their health, medical questions, and treatment options, including any risks or benefits regarding use of medications. This information does not endorse any treatments or medications as safe, effective, or approved for treating a specific patient. [employer] and its affiliates disclaim any warranty or liability relating to this information or the use thereof. The use of this information is governed by the Terms of Use, available at https://www.w[employer]Mes[employer]ater.com/en/know/clinical-effectiveness-terms   Copyright   Copyright © 2024 [employer] and its affiliates and/or licensors. All rights reserved.
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
## 2025-06-16 ENCOUNTER — APPOINTMENT (OUTPATIENT)
Age: 45
End: 2025-06-16
Payer: COMMERCIAL

## 2025-06-16 DIAGNOSIS — M54.2 NECK PAIN ON LEFT SIDE: ICD-10-CM

## 2025-06-16 DIAGNOSIS — Z13.6 SCREENING FOR CARDIOVASCULAR CONDITION: ICD-10-CM

## 2025-06-16 DIAGNOSIS — F32.1 CURRENT MODERATE EPISODE OF MAJOR DEPRESSIVE DISORDER, UNSPECIFIED WHETHER RECURRENT (HCC): ICD-10-CM

## 2025-06-16 DIAGNOSIS — D64.9 ANEMIA, UNSPECIFIED TYPE: ICD-10-CM

## 2025-06-16 LAB
25(OH)D3 SERPL-MCNC: 45.3 NG/ML (ref 30–100)
MAGNESIUM SERPL-MCNC: 2 MG/DL (ref 1.9–2.7)

## 2025-06-16 PROCEDURE — 72050 X-RAY EXAM NECK SPINE 4/5VWS: CPT

## 2025-06-16 PROCEDURE — 83735 ASSAY OF MAGNESIUM: CPT

## 2025-06-16 PROCEDURE — 82306 VITAMIN D 25 HYDROXY: CPT

## 2025-06-16 PROCEDURE — 36415 COLL VENOUS BLD VENIPUNCTURE: CPT

## 2025-06-17 ENCOUNTER — OFFICE VISIT (OUTPATIENT)
Dept: PHYSICAL THERAPY | Facility: CLINIC | Age: 45
End: 2025-06-17
Attending: ORTHOPAEDIC SURGERY
Payer: COMMERCIAL

## 2025-06-17 DIAGNOSIS — M75.42 SUBACROMIAL IMPINGEMENT OF LEFT SHOULDER: Primary | ICD-10-CM

## 2025-06-17 DIAGNOSIS — M24.812 INTERNAL DERANGEMENT OF LEFT SHOULDER: ICD-10-CM

## 2025-06-17 PROCEDURE — 97110 THERAPEUTIC EXERCISES: CPT | Performed by: PHYSICAL THERAPIST

## 2025-06-17 PROCEDURE — 97140 MANUAL THERAPY 1/> REGIONS: CPT | Performed by: PHYSICAL THERAPIST

## 2025-06-17 NOTE — PROGRESS NOTES
Daily Note     Today's date: 2025  Patient name: Kennedy Blake  : 1980  MRN: [id number]  Referring provider: Neftali Velasquez*  Dx:   Encounter Diagnosis     ICD-10-CM    1. Subacromial impingement of left shoulder  M75.42       2. Internal derangement of left shoulder  M24.812                      Subjective: Pt reports that her symptoms continue to improve. Feeling better shoulder and neck pain. Able to       Objective: See treatment diary below      Assessment: Tolerated treatment well. Pt wanted to try chest press, unable to tolerate. Held on this. Shrugs continued to improve symptoms. Updated HEP to progress this. Patient would benefit from continued PT      Plan: Continue per plan of care.  Progress treatment as tolerated.       Precautions: N/A  POC expires Unit limit Auth Expiration date PT/OT/ST + Visit Limit?   25 N/a pending BOMN                           Visit/Unit Tracking  AUTH Status:  Date               pending Used 1               Remaining                        Manuals 4/30 5/29 6/6 6/10 6/17        L cervical upglide BRENDA gd 4 C5            CPA/UPA  BRENDA gd 4 C5 BRENDA gd 4 C5 BRENDA gd 4 C6 BRENDA gd 4+ C6        Prone upper thoracic HVLA    BRENDA BRENDA                     Neuro Re-Ed             Median nerve slider  BRENDA + HEP                                                                                         Ther Ex             Cervical upglide self mob 10x HEP            Shoulder flexion SSMP   Scap elevation 2x10          Shoulder shrug   5# 2x15 5# 4x15 10# 4x15        DB lateral ROM    Full ROM 2x10 1,2#, half ROM 3# 2x15,6         Chest press     10# 1x15                                  Pt edu BRENDA BRENDA BRENDA BRENDA BRENDA        Ther Activity                                       Gait Training                                       Modalities

## 2025-06-19 ENCOUNTER — HOSPITAL ENCOUNTER (OUTPATIENT)
Dept: INFUSION CENTER | Facility: CLINIC | Age: 45
Discharge: HOME | End: 2025-06-19
Attending: FAMILY MEDICINE
Payer: COMMERCIAL

## 2025-06-19 VITALS
TEMPERATURE: 97.3 F | HEART RATE: 63 BPM | DIASTOLIC BLOOD PRESSURE: 56 MMHG | RESPIRATION RATE: 19 BRPM | SYSTOLIC BLOOD PRESSURE: 115 MMHG

## 2025-06-19 DIAGNOSIS — D64.9 ANEMIA, UNSPECIFIED TYPE: ICD-10-CM

## 2025-06-19 DIAGNOSIS — R79.0 LOW SERUM FERRITIN LEVEL: Primary | ICD-10-CM

## 2025-06-19 PROCEDURE — 96365 THER/PROPH/DIAG IV INF INIT: CPT

## 2025-06-19 RX ORDER — SODIUM CHLORIDE 9 MG/ML
20 INJECTION, SOLUTION INTRAVENOUS ONCE
Status: COMPLETED | OUTPATIENT
Start: 2025-06-19 | End: 2025-06-19

## 2025-06-19 RX ORDER — SODIUM CHLORIDE 9 MG/ML
20 INJECTION, SOLUTION INTRAVENOUS ONCE
Status: CANCELLED | OUTPATIENT
Start: 2025-06-27

## 2025-06-19 RX ADMIN — IRON SUCROSE 200 MG: 20 INJECTION, SOLUTION INTRAVENOUS at 16:09

## 2025-06-19 RX ADMIN — SODIUM CHLORIDE 20 ML/HR: 9 INJECTION, SOLUTION INTRAVENOUS at 16:10

## 2025-06-19 NOTE — PROGRESS NOTES
Pt here for venofer infusion, offering no complaints. Peripheral IV placed with positive blood return noted. Tolerating infusion without incident, report given to herminio yancey RN. Next appointment on 6/27/25 at 4pm.

## 2025-06-25 DIAGNOSIS — D64.9 ANEMIA, UNSPECIFIED TYPE: ICD-10-CM

## 2025-06-26 RX ORDER — FERROUS SULFATE 324(65)MG
324 TABLET, DELAYED RELEASE (ENTERIC COATED) ORAL
Qty: 60 TABLET | Refills: 5 | Status: SHIPPED | OUTPATIENT
Start: 2025-06-26

## 2025-06-27 ENCOUNTER — APPOINTMENT (OUTPATIENT)
Dept: PHYSICAL THERAPY | Facility: CLINIC | Age: 45
End: 2025-06-27
Attending: ORTHOPAEDIC SURGERY
Payer: COMMERCIAL

## 2025-06-27 ENCOUNTER — HOSPITAL ENCOUNTER (OUTPATIENT)
Dept: INFUSION CENTER | Facility: CLINIC | Age: 45
End: 2025-06-27
Attending: FAMILY MEDICINE
Payer: COMMERCIAL

## 2025-06-27 VITALS
DIASTOLIC BLOOD PRESSURE: 52 MMHG | TEMPERATURE: 97.7 F | HEART RATE: 70 BPM | RESPIRATION RATE: 18 BRPM | SYSTOLIC BLOOD PRESSURE: 101 MMHG

## 2025-06-27 DIAGNOSIS — D64.9 ANEMIA, UNSPECIFIED TYPE: ICD-10-CM

## 2025-06-27 DIAGNOSIS — R79.0 LOW SERUM FERRITIN LEVEL: Primary | ICD-10-CM

## 2025-06-27 PROCEDURE — 96365 THER/PROPH/DIAG IV INF INIT: CPT

## 2025-06-27 RX ORDER — SODIUM CHLORIDE 9 MG/ML
20 INJECTION, SOLUTION INTRAVENOUS ONCE
Status: COMPLETED | OUTPATIENT
Start: 2025-06-27 | End: 2025-06-27

## 2025-06-27 RX ORDER — SODIUM CHLORIDE 9 MG/ML
20 INJECTION, SOLUTION INTRAVENOUS ONCE
OUTPATIENT
Start: 2025-07-03

## 2025-06-27 RX ADMIN — IRON SUCROSE 200 MG: 20 INJECTION, SOLUTION INTRAVENOUS at 15:33

## 2025-06-27 RX ADMIN — SODIUM CHLORIDE 20 ML/HR: 9 INJECTION, SOLUTION INTRAVENOUS at 15:33

## 2025-06-27 NOTE — PROGRESS NOTES
Pt presents for venofer infusion offering no complaints. Medication infusing without difficulty. Next appointment reviewed on 7/9 at 4pm. Report given to Sommer SALAMANCA RN.

## 2025-07-01 ENCOUNTER — TELEPHONE (OUTPATIENT)
Age: 45
End: 2025-07-01

## 2025-07-01 ENCOUNTER — HOSPITAL ENCOUNTER (OUTPATIENT)
Dept: INFUSION CENTER | Facility: CLINIC | Age: 45
Discharge: HOME | End: 2025-07-01
Attending: FAMILY MEDICINE
Payer: COMMERCIAL

## 2025-07-01 VITALS
SYSTOLIC BLOOD PRESSURE: 105 MMHG | RESPIRATION RATE: 18 BRPM | DIASTOLIC BLOOD PRESSURE: 69 MMHG | TEMPERATURE: 97.3 F | HEART RATE: 2 BPM

## 2025-07-01 DIAGNOSIS — R79.0 LOW SERUM FERRITIN LEVEL: Primary | ICD-10-CM

## 2025-07-01 DIAGNOSIS — D64.9 ANEMIA, UNSPECIFIED TYPE: ICD-10-CM

## 2025-07-01 PROCEDURE — 96365 THER/PROPH/DIAG IV INF INIT: CPT

## 2025-07-01 RX ORDER — SODIUM CHLORIDE 9 MG/ML
20 INJECTION, SOLUTION INTRAVENOUS ONCE
Status: COMPLETED | OUTPATIENT
Start: 2025-07-01 | End: 2025-07-01

## 2025-07-01 RX ORDER — SODIUM CHLORIDE 9 MG/ML
20 INJECTION, SOLUTION INTRAVENOUS ONCE
Status: CANCELLED | OUTPATIENT
Start: 2025-07-09

## 2025-07-01 RX ADMIN — IRON SUCROSE 200 MG: 20 INJECTION, SOLUTION INTRAVENOUS at 16:25

## 2025-07-01 RX ADMIN — SODIUM CHLORIDE 20 ML/HR: 9 INJECTION, SOLUTION INTRAVENOUS at 16:25

## 2025-07-01 NOTE — TELEPHONE ENCOUNTER
Patient stated that base on her abnormal labs results she would like to investigation further by doing genetics testing. She states her mom is a diabetics, and not sure if this is related to her results. She has a fairly good diet and is active. She is requesting PCP recommendation.

## 2025-07-01 NOTE — PROGRESS NOTES
Pt here for venofer infusion, offering no complaints. Peripheral IV placed with positive blood return noted. Tolerated infusion without incident. Peripheral IV removed. AVS declined. Walked out in stable condition. Next appointment on 7/9/25 at 4pm.

## 2025-07-02 NOTE — TELEPHONE ENCOUNTER
Patient called and stated that she is did not want any testing done she just would like to consult with a genetics counselor to see what possibilities she may have. She stated that her dad is bipolar and would like to know if this is something genetic as well as the abnormal results.    Please advise

## 2025-07-03 ENCOUNTER — OFFICE VISIT (OUTPATIENT)
Dept: PHYSICAL THERAPY | Facility: CLINIC | Age: 45
End: 2025-07-03
Attending: ORTHOPAEDIC SURGERY
Payer: COMMERCIAL

## 2025-07-03 DIAGNOSIS — M75.42 SUBACROMIAL IMPINGEMENT OF LEFT SHOULDER: Primary | ICD-10-CM

## 2025-07-03 DIAGNOSIS — M24.812 INTERNAL DERANGEMENT OF LEFT SHOULDER: ICD-10-CM

## 2025-07-03 PROCEDURE — 97140 MANUAL THERAPY 1/> REGIONS: CPT | Performed by: PHYSICAL THERAPIST

## 2025-07-03 PROCEDURE — 97110 THERAPEUTIC EXERCISES: CPT | Performed by: PHYSICAL THERAPIST

## 2025-07-03 NOTE — PROGRESS NOTES
Daily Note     Today's date: 7/3/2025  Patient name: Kennedy Blake  : 1980  MRN: [id number]  Referring provider: Neftali Velasquez*  Dx:   Encounter Diagnosis     ICD-10-CM    1. Subacromial impingement of left shoulder  M75.42       2. Internal derangement of left shoulder  M24.812                      Subjective: Pt reports that overall his symptoms are improving. Continues to be unable to chest press without pain, but can modify so that her elbows are closer to her body to improve be painfree.       Objective: See treatment diary below      Assessment: Tolerated treatment well. Initiated isolated RTC strengthening, improved symptoms. UT exercise continues to improve symptoms as well. Patient would benefit from continued PT      Plan: Continue per plan of care.  Progress treatment as tolerated.       Precautions: N/A  POC expires Unit limit Auth Expiration date PT/OT/ST + Visit Limit?   25 N/a pending BOMN                           Visit/Unit Tracking  AUTH Status:  Date               pending Used 1               Remaining                    Manuals 4/30 5/29 6/6 6/10 6/17 7/3       L cervical upglide BRENDA gd 4 C5            CPA/UPA  BRENDA gd 4 C5 BRENDA gd 4 C5 BRENDA gd 4 C6 BRENDA gd 4+ C6 BRENDA gd 4+ C6       Prone upper thoracic HVLA    BRENDA BRENDA BRENDA                    Neuro Re-Ed             Median nerve slider  BRENDA + HEP                                                                                         Ther Ex             Cervical upglide self mob 10x HEP            Shoulder flexion SSMP   Scap elevation 2x10          Shoulder shrug   5# 2x15 5# 4x15 10# 4x15 10# 3x15       DB lateral ROM    Full ROM 2x10 1,2#, half ROM 3# 2x15,6         Chest press     10# 1x15 10# 1x10       Seated shoulder supported ER      2# 3x12                    Pt edu BRENDA BRENDA BRENDA BRENDA BRENDA BRENDA       Ther Activity                                       Gait Training                                       Modalities

## 2025-07-09 ENCOUNTER — HOSPITAL ENCOUNTER (OUTPATIENT)
Dept: INFUSION CENTER | Facility: CLINIC | Age: 45
Discharge: HOME | End: 2025-07-09
Attending: FAMILY MEDICINE
Payer: COMMERCIAL

## 2025-07-09 VITALS
RESPIRATION RATE: 18 BRPM | HEART RATE: 68 BPM | DIASTOLIC BLOOD PRESSURE: 78 MMHG | TEMPERATURE: 97 F | SYSTOLIC BLOOD PRESSURE: 111 MMHG

## 2025-07-09 DIAGNOSIS — D64.9 ANEMIA, UNSPECIFIED TYPE: ICD-10-CM

## 2025-07-09 DIAGNOSIS — R79.0 LOW SERUM FERRITIN LEVEL: Primary | ICD-10-CM

## 2025-07-09 PROCEDURE — 96365 THER/PROPH/DIAG IV INF INIT: CPT

## 2025-07-09 RX ORDER — SODIUM CHLORIDE 9 MG/ML
20 INJECTION, SOLUTION INTRAVENOUS ONCE
Status: CANCELLED | OUTPATIENT
Start: 2025-07-18

## 2025-07-09 RX ORDER — SODIUM CHLORIDE 9 MG/ML
20 INJECTION, SOLUTION INTRAVENOUS ONCE
Status: COMPLETED | OUTPATIENT
Start: 2025-07-09 | End: 2025-07-09

## 2025-07-09 RX ADMIN — SODIUM CHLORIDE 20 ML/HR: 9 INJECTION, SOLUTION INTRAVENOUS at 16:23

## 2025-07-09 RX ADMIN — IRON SUCROSE 200 MG: 20 INJECTION, SOLUTION INTRAVENOUS at 16:23

## 2025-07-09 NOTE — PROGRESS NOTES
Pt to clinic for Venofer. Pt offers no complaints. Pt tolerating infusion without complications. Pt aware of next appointment on 7/18/25 at 1600. Report given to Megan Ferguson RN.

## 2025-07-09 NOTE — PROGRESS NOTES
Kennedy Kianaluis alberto tolerated remainder of Venofer treatment well with no complications. PIV removed.    Kennedy Blake is aware of future appt on 7/18/25 at 4 pm.     AVS declined.

## 2025-07-18 ENCOUNTER — TELEPHONE (OUTPATIENT)
Age: 45
End: 2025-07-18

## 2025-07-18 ENCOUNTER — OFFICE VISIT (OUTPATIENT)
Dept: PHYSICAL THERAPY | Facility: CLINIC | Age: 45
End: 2025-07-18
Attending: ORTHOPAEDIC SURGERY
Payer: COMMERCIAL

## 2025-07-18 ENCOUNTER — HOSPITAL ENCOUNTER (OUTPATIENT)
Dept: INFUSION CENTER | Facility: CLINIC | Age: 45
End: 2025-07-18
Attending: FAMILY MEDICINE
Payer: COMMERCIAL

## 2025-07-18 VITALS
TEMPERATURE: 97.9 F | DIASTOLIC BLOOD PRESSURE: 56 MMHG | HEART RATE: 59 BPM | RESPIRATION RATE: 18 BRPM | SYSTOLIC BLOOD PRESSURE: 117 MMHG

## 2025-07-18 DIAGNOSIS — M24.812 INTERNAL DERANGEMENT OF LEFT SHOULDER: ICD-10-CM

## 2025-07-18 DIAGNOSIS — D64.9 ANEMIA, UNSPECIFIED TYPE: ICD-10-CM

## 2025-07-18 DIAGNOSIS — M75.42 SUBACROMIAL IMPINGEMENT OF LEFT SHOULDER: Primary | ICD-10-CM

## 2025-07-18 DIAGNOSIS — R79.0 LOW SERUM FERRITIN LEVEL: Primary | ICD-10-CM

## 2025-07-18 PROCEDURE — 96365 THER/PROPH/DIAG IV INF INIT: CPT

## 2025-07-18 PROCEDURE — 97140 MANUAL THERAPY 1/> REGIONS: CPT | Performed by: PHYSICAL THERAPIST

## 2025-07-18 PROCEDURE — 97110 THERAPEUTIC EXERCISES: CPT | Performed by: PHYSICAL THERAPIST

## 2025-07-18 RX ORDER — SODIUM CHLORIDE 9 MG/ML
20 INJECTION, SOLUTION INTRAVENOUS ONCE
Status: COMPLETED | OUTPATIENT
Start: 2025-07-18 | End: 2025-07-18

## 2025-07-18 RX ORDER — SODIUM CHLORIDE 9 MG/ML
20 INJECTION, SOLUTION INTRAVENOUS ONCE
Status: CANCELLED | OUTPATIENT
Start: 2025-07-18

## 2025-07-18 RX ADMIN — SODIUM CHLORIDE 200 MG: 9 INJECTION, SOLUTION INTRAVENOUS at 16:14

## 2025-07-18 RX ADMIN — SODIUM CHLORIDE 20 ML/HR: 9 INJECTION, SOLUTION INTRAVENOUS at 16:13

## 2025-07-18 NOTE — TELEPHONE ENCOUNTER
Patient will have her last iron infusion today.     She informs she may lose her health insurance coverage at the end of this month.    She asks if labs to check her iron levels can be done prior to the end of the month. She requests lab orders, if so.    She requests return call with PCP recommendation.

## 2025-07-18 NOTE — PROGRESS NOTES
Daily Note     Today's date: 2025  Patient name: Kennedy Blake  : 1980  MRN: [id number]  Referring provider: Neftali Velasquez*  Dx:   Encounter Diagnosis     ICD-10-CM    1. Subacromial impingement of left shoulder  M75.42       2. Internal derangement of left shoulder  M24.812                      Subjective: Pt reports that her shoulder and neck continues to improve. Having pain with closing windows at home      Objective: See treatment diary below      Assessment: Tolerated treatment well. Improvement in symptoms with exercise, continued lack of RTC strength. Noted pain with weighted long lever shoulder flexion. Some improvement following upper trap activation, but overall, still painful. Will need to continue to assess at next follow-up. Patient would benefit from continued PT      Plan: Continue per plan of care.  Progress treatment as tolerated.       Precautions: N/A  POC expires Unit limit Auth Expiration date PT/OT/ST + Visit Limit?   25 N/a pending BOMN                           Visit/Unit Tracking  AUTH Status:  Date               pending Used 1               Remaining                  Access Code: [email or web address]    Manuals 4/30 5/29 6/6 6/10 6/17 7/3 7/18      L cervical upglide BRENDA gd 4 C5            CPA/UPA  BRENDA gd 4 C5 BRENDA gd 4 C5 BRENDA gd 4 C6 BRENDA gd 4+ C6 BRENDA gd 4+ C6       Prone upper thoracic HVLA    BRENDA BRENDA BRENDA BRENDA                   Neuro Re-Ed             Median nerve slider  BRENDA + HEP                                                                                         Ther Ex             Cervical upglide self mob 10x HEP            Shoulder flexion SSMP   Scap elevation 2x10          Shoulder shrug   5# 2x15 5# 4x15 10# 4x15 10# 3x15 10,15# 3x15      DB lateral ROM    Full ROM 2x10 1,2#, half ROM 3# 2x15,6         Chest press     10# 1x15 10# 1x10       Seated shoulder supported ER      2# 3x12 2# 3x15,                    Pt edu BRENDA BRENDA BRENDA BRENDA BRENDA BRENDA BRENDA      Ther Activity                                        Gait Training                                       Modalities

## 2025-07-18 NOTE — PROGRESS NOTES
Patient arrives to infusion center for IV Venofer infusion. Patient offers no complaints today. PIV established, patient tolerated entire infusion without complication. PIV removed, AVS offered.     Next appointment: no future appointments, patient has completed treatment

## 2025-07-22 ENCOUNTER — OFFICE VISIT (OUTPATIENT)
Dept: FAMILY MEDICINE CLINIC | Facility: CLINIC | Age: 45
End: 2025-07-22
Payer: COMMERCIAL

## 2025-07-22 VITALS
HEART RATE: 78 BPM | DIASTOLIC BLOOD PRESSURE: 64 MMHG | BODY MASS INDEX: 27.02 KG/M2 | TEMPERATURE: 98.5 F | OXYGEN SATURATION: 98 % | WEIGHT: 183 LBS | SYSTOLIC BLOOD PRESSURE: 114 MMHG

## 2025-07-22 DIAGNOSIS — J06.9 UPPER RESPIRATORY TRACT INFECTION, UNSPECIFIED TYPE: Primary | ICD-10-CM

## 2025-07-22 LAB
S PYO AG THROAT QL: NEGATIVE
SARS-COV-2 AG UPPER RESP QL IA: NEGATIVE
VALID CONTROL: NORMAL

## 2025-07-22 PROCEDURE — 87811 SARS-COV-2 COVID19 W/OPTIC: CPT | Performed by: FAMILY MEDICINE

## 2025-07-22 PROCEDURE — 99213 OFFICE O/P EST LOW 20 MIN: CPT | Performed by: FAMILY MEDICINE

## 2025-07-22 PROCEDURE — 87880 STREP A ASSAY W/OPTIC: CPT | Performed by: FAMILY MEDICINE

## 2025-07-22 RX ORDER — AZITHROMYCIN 250 MG/1
TABLET, FILM COATED ORAL
Qty: 6 TABLET | Refills: 0 | Status: SHIPPED | OUTPATIENT
Start: 2025-07-22 | End: 2025-07-26

## 2025-07-22 NOTE — PROGRESS NOTES
Name: Kennedy Blake      : 1980      MRN: [id number]  Encounter Provider: FANI Cristobal  Encounter Date: 2025   Encounter department: [facility] [address]  :  Assessment & Plan  Upper respiratory tract infection, unspecified type  Discussed findings with patient reviewed potential contacts, negative testing  Increase oral hydration, warm tea with honey, increase nutrition   Adequate rest  Tylenol or Motrin for pain and/or fever  Nasal mist  Humidify room  Use decongestant- Mucinex  Zinc lozenges   Good handwashing  Call for any changes failure to resolve  Orders:    azithromycin (ZITHROMAX) 250 mg tablet; Take 2 tablets today then 1 tablet daily x 4 days           History of Present Illness   C/o cough   St, hoarseness , difficulty with swallowing   Concert , phish , in [place]   Positive ill contact  Low-grade fever cough cough productive of yellow thick  Sore throat  Home treatment      URI   Associated symptoms include congestion, coughing, rhinorrhea and a sore throat. Pertinent negatives include no abdominal pain, chest pain, diarrhea, dysuria, ear pain, headaches, nausea, rash, sinus pain, sneezing or vomiting.     Review of Systems   Constitutional:  Negative for appetite change, chills, fever and unexpected weight change.   HENT:  Positive for congestion, rhinorrhea, sore throat and voice change. Negative for dental problem, ear pain, hearing loss, postnasal drip, sinus pressure, sinus pain, sneezing and tinnitus.    Eyes:  Negative for visual disturbance.   Respiratory:  Positive for cough. Negative for apnea, chest tightness and shortness of breath.    Cardiovascular:  Negative for chest pain, palpitations and leg swelling.   Gastrointestinal:  Negative for abdominal pain, blood in stool, constipation, diarrhea, nausea and vomiting.   Endocrine: Negative for cold intolerance, heat intolerance, polydipsia, polyphagia and polyuria.    Genitourinary:  Negative for decreased urine volume, difficulty urinating, dysuria, frequency and hematuria.   Musculoskeletal:  Negative for arthralgias, back pain, gait problem, joint swelling and myalgias.   Skin:  Negative for color change, rash and wound.   Allergic/Immunologic: Negative for environmental allergies and food allergies.   Neurological:  Negative for dizziness, syncope, weakness, light-headedness, numbness and headaches.   Hematological:  Negative for adenopathy. Does not bruise/bleed easily.   Psychiatric/Behavioral:  Negative for sleep disturbance and suicidal ideas. The patient is not nervous/anxious.        Objective   /64   Pulse 78   Temp 98.5 °F (36.9 °C)   Wt 83 kg (183 lb)   SpO2 98%   BMI 27.02 kg/m²      Physical Exam  Constitutional:       General: She is not in acute distress.     Appearance: She is well-developed. She is not ill-appearing or toxic-appearing.   HENT:      Head: Normocephalic and atraumatic.      Right Ear: Tympanic membrane normal.      Left Ear: Tympanic membrane normal.      Nose: Rhinorrhea present.      Mouth/Throat:      Pharynx: Posterior oropharyngeal erythema present.     Cardiovascular:      Rate and Rhythm: Normal rate and regular rhythm.      Heart sounds: Normal heart sounds.   Pulmonary:      Effort: Pulmonary effort is normal.      Breath sounds: Normal breath sounds.   Abdominal:      General: Bowel sounds are normal.     Musculoskeletal:         General: Normal range of motion.      Cervical back: Normal range of motion and neck supple.   Lymphadenopathy:      Cervical: No cervical adenopathy.     Skin:     General: Skin is warm and dry.      Findings: No rash.     Neurological:      Mental Status: She is alert and oriented to person, place, and time.      Deep Tendon Reflexes: Reflexes are normal and symmetric.     Psychiatric:         Behavior: Behavior normal.         Thought Content: Thought content normal.         Judgment: Judgment  normal.

## 2025-07-23 DIAGNOSIS — D64.9 ANEMIA, UNSPECIFIED TYPE: ICD-10-CM

## 2025-07-24 ENCOUNTER — APPOINTMENT (OUTPATIENT)
Age: 45
End: 2025-07-24
Attending: NURSE PRACTITIONER
Payer: COMMERCIAL

## 2025-07-24 ENCOUNTER — NURSE TRIAGE (OUTPATIENT)
Age: 45
End: 2025-07-24

## 2025-07-24 ENCOUNTER — APPOINTMENT (OUTPATIENT)
Dept: PHYSICAL THERAPY | Facility: CLINIC | Age: 45
End: 2025-07-24
Attending: ORTHOPAEDIC SURGERY
Payer: COMMERCIAL

## 2025-07-24 DIAGNOSIS — R31.29 MICROSCOPIC HEMATURIA: ICD-10-CM

## 2025-07-24 DIAGNOSIS — D50.9 IRON DEFICIENCY ANEMIA, UNSPECIFIED IRON DEFICIENCY ANEMIA TYPE: ICD-10-CM

## 2025-07-24 LAB
BACTERIA UR QL AUTO: ABNORMAL /HPF
BILIRUB UR QL STRIP: NEGATIVE
CLARITY UR: CLEAR
COLOR UR: YELLOW
GLUCOSE UR STRIP-MCNC: NEGATIVE MG/DL
HEMOCCULT STL QL IA: POSITIVE
HGB UR QL STRIP.AUTO: ABNORMAL
KETONES UR STRIP-MCNC: NEGATIVE MG/DL
LEUKOCYTE ESTERASE UR QL STRIP: NEGATIVE
MUCOUS THREADS UR QL AUTO: ABNORMAL
NITRITE UR QL STRIP: NEGATIVE
NON-SQ EPI CELLS URNS QL MICRO: ABNORMAL /HPF
PH UR STRIP.AUTO: 6 [PH]
PROT UR STRIP-MCNC: ABNORMAL MG/DL
RBC #/AREA URNS AUTO: ABNORMAL /HPF
SP GR UR STRIP.AUTO: 1.02 (ref 1–1.03)
UROBILINOGEN UR STRIP-ACNC: <2 MG/DL
WBC #/AREA URNS AUTO: ABNORMAL /HPF

## 2025-07-24 PROCEDURE — 81001 URINALYSIS AUTO W/SCOPE: CPT

## 2025-07-24 PROCEDURE — G0328 FECAL BLOOD SCRN IMMUNOASSAY: HCPCS

## 2025-07-24 NOTE — TELEPHONE ENCOUNTER
"REASON FOR CONVERSATION: Medication Reaction    SYMPTOMS: Pt reports she now has diarrhea since starting the abx (azithromycin (ZITHROMAX) 250 mg tablet) . Pt has had 4 bm in 24hr and is pushing fluids. Pt denies abdominal pain no fevers and no blood in stool.     OTHER HEALTH INFORMATION: n/a    PROTOCOL DISPOSITION: See Today in Office    CARE ADVICE PROVIDED: Triage nurse called office to have PCP further advise and Staff: Angelica advised we send a message to PCP.     PRACTICE FOLLOW-UP: PCP please further advise pt on next steps.         Reason for Disposition   MODERATE diarrhea (e.g., 4-6 times / day more than normal)    Answer Assessment - Initial Assessment Questions  1. ANTIBIOTIC: \"What antibiotic are you taking?\" \"How many times per day?\"      zpack  2. ANTIBIOTIC ONSET: \"When was the antibiotic started?\"      Yesterday   3. DIARRHEA SEVERITY: \"How bad is the diarrhea?\" \"How many more stools have you had in the past 24 hours than normal?\"       4   4. ONSET: \"When did the diarrhea begin?\"       Yesterday   5. BM CONSISTENCY: \"How loose or watery is the diarrhea?\"       Loose   6. VOMITING: \"Are you also vomiting?\" If Yes, ask: \"How many times in the past 24 hours?\"       Denies  7. ABDOMEN PAIN: \"Are you having any abdomen pain?\" If Yes, ask: \"What does it feel like?\" (e.g., crampy, dull, intermittent, constant)       denies  8. ABDOMEN PAIN SEVERITY: If present, ask: \"How bad is the pain?\"  (e.g., Scale 1-10; mild, moderate, or severe)      denies  9. ORAL INTAKE: If vomiting, \"Have you been able to drink liquids?\" \"How much liquids have you had in the past 24 hours?\"      Drinking well   10. HYDRATION: \"Any signs of dehydration?\" (e.g., dry mouth [not just dry lips], too weak to stand, dizziness, new weight loss) \"When did you last urinate?\"        dENIES   11. EXPOSURE: \"Have you traveled to a foreign country recently?\" \"Have you been exposed to anyone with diarrhea?\" \"Could you have eaten any food that " "was spoiled?\"        N/A  12. OTHER SYMPTOMS: \"Do you have any other symptoms?\" (e.g., fever, blood in stool)        DENIES  13. PREGNANCY: \"Is there any chance you are pregnant?\" \"When was your last menstrual period?\"        Denies- last cycle 7/5    Protocols used: Diarrhea on Antibiotics-Adult-[place]    "

## 2025-07-25 ENCOUNTER — APPOINTMENT (OUTPATIENT)
Dept: PHYSICAL THERAPY | Facility: CLINIC | Age: 45
End: 2025-07-25
Attending: ORTHOPAEDIC SURGERY
Payer: COMMERCIAL

## 2025-07-25 RX ORDER — FERROUS SULFATE 324(65)MG
324 TABLET, DELAYED RELEASE (ENTERIC COATED) ORAL
Qty: 180 TABLET | Refills: 1 | Status: SHIPPED | OUTPATIENT
Start: 2025-07-25

## 2025-07-27 DIAGNOSIS — F32.1 CURRENT MODERATE EPISODE OF MAJOR DEPRESSIVE DISORDER, UNSPECIFIED WHETHER RECURRENT (HCC): ICD-10-CM

## 2025-07-28 RX ORDER — SERTRALINE HYDROCHLORIDE 25 MG/1
25 TABLET, FILM COATED ORAL
Qty: 90 TABLET | Refills: 1 | Status: SHIPPED | OUTPATIENT
Start: 2025-07-28

## 2025-07-31 ENCOUNTER — OFFICE VISIT (OUTPATIENT)
Dept: PAIN MEDICINE | Facility: CLINIC | Age: 45
End: 2025-07-31
Payer: COMMERCIAL

## 2025-07-31 ENCOUNTER — APPOINTMENT (OUTPATIENT)
Dept: RADIOLOGY | Facility: CLINIC | Age: 45
End: 2025-07-31
Payer: COMMERCIAL

## 2025-07-31 VITALS — BODY MASS INDEX: 26.81 KG/M2 | HEIGHT: 69 IN | WEIGHT: 181 LBS

## 2025-07-31 DIAGNOSIS — M50.30 DDD (DEGENERATIVE DISC DISEASE), CERVICAL: ICD-10-CM

## 2025-07-31 DIAGNOSIS — M21.70 LEG LENGTH DIFFERENCE, ACQUIRED: Primary | ICD-10-CM

## 2025-07-31 DIAGNOSIS — G89.29 CHRONIC BILATERAL LOW BACK PAIN WITH LEFT-SIDED SCIATICA: ICD-10-CM

## 2025-07-31 DIAGNOSIS — M54.12 CERVICAL RADICULOPATHY: ICD-10-CM

## 2025-07-31 DIAGNOSIS — M54.42 CHRONIC BILATERAL LOW BACK PAIN WITH LEFT-SIDED SCIATICA: ICD-10-CM

## 2025-07-31 DIAGNOSIS — M54.2 NECK PAIN ON LEFT SIDE: ICD-10-CM

## 2025-07-31 PROCEDURE — 72110 X-RAY EXAM L-2 SPINE 4/>VWS: CPT

## 2025-07-31 PROCEDURE — 99214 OFFICE O/P EST MOD 30 MIN: CPT
